# Patient Record
Sex: MALE | Race: WHITE | NOT HISPANIC OR LATINO | URBAN - METROPOLITAN AREA
[De-identification: names, ages, dates, MRNs, and addresses within clinical notes are randomized per-mention and may not be internally consistent; named-entity substitution may affect disease eponyms.]

---

## 2017-06-03 ENCOUNTER — EMERGENCY (EMERGENCY)
Facility: HOSPITAL | Age: 67
LOS: 1 days | Discharge: HOME | End: 2017-06-03
Attending: EMERGENCY MEDICINE
Payer: MEDICARE

## 2017-06-03 VITALS
HEART RATE: 110 BPM | WEIGHT: 240.08 LBS | RESPIRATION RATE: 20 BRPM | TEMPERATURE: 98 F | HEIGHT: 70 IN | OXYGEN SATURATION: 98 % | DIASTOLIC BLOOD PRESSURE: 90 MMHG | SYSTOLIC BLOOD PRESSURE: 164 MMHG

## 2017-06-03 DIAGNOSIS — K05.10 CHRONIC GINGIVITIS, PLAQUE INDUCED: ICD-10-CM

## 2017-06-03 DIAGNOSIS — Z88.8 ALLERGY STATUS TO OTHER DRUGS, MEDICAMENTS AND BIOLOGICAL SUBSTANCES STATUS: ICD-10-CM

## 2017-06-03 DIAGNOSIS — Z88.0 ALLERGY STATUS TO PENICILLIN: ICD-10-CM

## 2017-06-03 DIAGNOSIS — K13.79 OTHER LESIONS OF ORAL MUCOSA: ICD-10-CM

## 2017-06-03 PROCEDURE — 99283 EMERGENCY DEPT VISIT LOW MDM: CPT | Mod: 25

## 2017-06-03 PROCEDURE — 96372 THER/PROPH/DIAG INJ SC/IM: CPT

## 2017-06-03 PROCEDURE — 99283 EMERGENCY DEPT VISIT LOW MDM: CPT

## 2017-06-03 RX ORDER — KETOROLAC TROMETHAMINE 30 MG/ML
1 SYRINGE (ML) INJECTION
Qty: 8 | Refills: 0 | OUTPATIENT
Start: 2017-06-03 | End: 2017-06-05

## 2017-06-03 RX ORDER — KETOROLAC TROMETHAMINE 30 MG/ML
60 SYRINGE (ML) INJECTION ONCE
Qty: 0 | Refills: 0 | Status: DISCONTINUED | OUTPATIENT
Start: 2017-06-03 | End: 2017-06-03

## 2017-06-03 RX ADMIN — Medication 60 MILLIGRAM(S): at 19:23

## 2017-06-03 RX ADMIN — Medication 300 MILLIGRAM(S): at 19:23

## 2017-06-03 NOTE — ED STATDOCS - OBJECTIVE STATEMENT
68 y/o M w/ PMHx of DDD and hyperthyroidism presents to the ED c/o shooting R jaw pain this morning. Pt states he had his teeth shaved down in the past. Pain Meds: Methadone 80mg and Fentanyl patch 50mg. Pt does not take any blood thinners. Pt has been using Oragel w/ no relief. No further complaints at this time.

## 2017-06-03 NOTE — ED ADULT TRIAGE NOTE - CHIEF COMPLAINT QUOTE
Patient arrived to ED today with c/o right mouth pain, right jaw pain, headache, dizziness, and blurry vision.  Patient states he has an infected tooth.

## 2017-06-03 NOTE — ED STATDOCS - NS ED MD SCRIBE ATTENDING SCRIBE SECTIONS
HIV/PAST MEDICAL/SURGICAL/SOCIAL HISTORY/INTAKE ASSESSMENT/SCREENINGS/VITAL SIGNS( Pullset)/REVIEW OF SYSTEMS/PHYSICAL EXAM/HISTORY OF PRESENT ILLNESS/DISPOSITION

## 2017-06-03 NOTE — ED STATDOCS - ATTENDING CONTRIBUTION TO CARE
I, Ruby Prado, performed the initial face to face bedside interview with this patient regarding history of present illness, review of symptoms and relevant past medical, social and family history.  I completed an independent physical examination.  I was the initial provider who evaluated this patient. I have signed out the follow up of any pending tests (i.e. labs, radiological studies) to the ACP.  I have communicated the patient’s plan of care and disposition with the ACP.  The history, relevant review of systems, past medical and surgical history, medical decision making, and physical examination was documented by the scribe in my presence and I attest to the accuracy of the documentation.

## 2017-06-03 NOTE — ED STATDOCS - PROGRESS NOTE DETAILS
HPI, ROS, PE done by intake doc. Orders/Plan reviewed. Pt feeling better after pain medications. PE- Well developed, well-nourish, resting comfortably in NAD. Mouth: gum swollen. grey puss to posterior upper gingival. exquisitely tender.  Plan: Clinda. Toradol. F/u with dental

## 2017-06-11 ENCOUNTER — EMERGENCY (EMERGENCY)
Facility: HOSPITAL | Age: 67
LOS: 1 days | Discharge: HOME | End: 2017-06-11
Attending: EMERGENCY MEDICINE
Payer: MEDICARE

## 2017-06-11 VITALS
DIASTOLIC BLOOD PRESSURE: 84 MMHG | SYSTOLIC BLOOD PRESSURE: 146 MMHG | HEART RATE: 82 BPM | OXYGEN SATURATION: 98 % | RESPIRATION RATE: 17 BRPM | TEMPERATURE: 98 F

## 2017-06-11 VITALS
DIASTOLIC BLOOD PRESSURE: 80 MMHG | WEIGHT: 240.08 LBS | SYSTOLIC BLOOD PRESSURE: 147 MMHG | TEMPERATURE: 98 F | OXYGEN SATURATION: 98 % | HEIGHT: 70 IN | HEART RATE: 96 BPM | RESPIRATION RATE: 20 BRPM

## 2017-06-11 DIAGNOSIS — Z98.890 OTHER SPECIFIED POSTPROCEDURAL STATES: Chronic | ICD-10-CM

## 2017-06-11 LAB
ALBUMIN SERPL ELPH-MCNC: 3.3 G/DL — SIGNIFICANT CHANGE UP (ref 3.3–5.2)
ALP SERPL-CCNC: 70 U/L — SIGNIFICANT CHANGE UP (ref 40–120)
ALT FLD-CCNC: 10 U/L — SIGNIFICANT CHANGE UP
ANION GAP SERPL CALC-SCNC: 12 MMOL/L — SIGNIFICANT CHANGE UP (ref 5–17)
APTT BLD: 34.1 SEC — SIGNIFICANT CHANGE UP (ref 27.5–37.4)
AST SERPL-CCNC: 9 U/L — SIGNIFICANT CHANGE UP
BILIRUB SERPL-MCNC: 0.2 MG/DL — LOW (ref 0.4–2)
BUN SERPL-MCNC: 17 MG/DL — SIGNIFICANT CHANGE UP (ref 8–20)
CALCIUM SERPL-MCNC: 9.2 MG/DL — SIGNIFICANT CHANGE UP (ref 8.6–10.2)
CHLORIDE SERPL-SCNC: 98 MMOL/L — SIGNIFICANT CHANGE UP (ref 98–107)
CK SERPL-CCNC: 18 U/L — LOW (ref 30–200)
CO2 SERPL-SCNC: 25 MMOL/L — SIGNIFICANT CHANGE UP (ref 22–29)
CREAT SERPL-MCNC: 0.8 MG/DL — SIGNIFICANT CHANGE UP (ref 0.5–1.3)
GLUCOSE SERPL-MCNC: 106 MG/DL — SIGNIFICANT CHANGE UP (ref 70–115)
HCT VFR BLD CALC: 38.4 % — LOW (ref 42–52)
HGB BLD-MCNC: 12.4 G/DL — LOW (ref 14–18)
INR BLD: 1.06 RATIO — SIGNIFICANT CHANGE UP (ref 0.88–1.16)
MCHC RBC-ENTMCNC: 27.6 PG — SIGNIFICANT CHANGE UP (ref 27–31)
MCHC RBC-ENTMCNC: 32.3 G/DL — SIGNIFICANT CHANGE UP (ref 32–36)
MCV RBC AUTO: 85.3 FL — SIGNIFICANT CHANGE UP (ref 80–94)
PLATELET # BLD AUTO: 235 K/UL — SIGNIFICANT CHANGE UP (ref 150–400)
POTASSIUM SERPL-MCNC: 4 MMOL/L — SIGNIFICANT CHANGE UP (ref 3.5–5.3)
POTASSIUM SERPL-SCNC: 4 MMOL/L — SIGNIFICANT CHANGE UP (ref 3.5–5.3)
PROT SERPL-MCNC: 6.7 G/DL — SIGNIFICANT CHANGE UP (ref 6.6–8.7)
PROTHROM AB SERPL-ACNC: 11.7 SEC — SIGNIFICANT CHANGE UP (ref 9.8–12.7)
RBC # BLD: 4.5 M/UL — LOW (ref 4.6–6.2)
RBC # FLD: 14.3 % — SIGNIFICANT CHANGE UP (ref 11–15.6)
SODIUM SERPL-SCNC: 135 MMOL/L — SIGNIFICANT CHANGE UP (ref 135–145)
TROPONIN T SERPL-MCNC: <0.01 NG/ML — SIGNIFICANT CHANGE UP (ref 0–0.06)
TROPONIN T SERPL-MCNC: <0.01 NG/ML — SIGNIFICANT CHANGE UP (ref 0–0.06)
WBC # BLD: 10 K/UL — SIGNIFICANT CHANGE UP (ref 4.8–10.8)
WBC # FLD AUTO: 10 K/UL — SIGNIFICANT CHANGE UP (ref 4.8–10.8)

## 2017-06-11 PROCEDURE — 93010 ELECTROCARDIOGRAM REPORT: CPT

## 2017-06-11 PROCEDURE — 71020: CPT | Mod: 26

## 2017-06-11 PROCEDURE — 85610 PROTHROMBIN TIME: CPT

## 2017-06-11 PROCEDURE — 71046 X-RAY EXAM CHEST 2 VIEWS: CPT

## 2017-06-11 PROCEDURE — 85379 FIBRIN DEGRADATION QUANT: CPT

## 2017-06-11 PROCEDURE — 99285 EMERGENCY DEPT VISIT HI MDM: CPT

## 2017-06-11 PROCEDURE — 82550 ASSAY OF CK (CPK): CPT

## 2017-06-11 PROCEDURE — 85730 THROMBOPLASTIN TIME PARTIAL: CPT

## 2017-06-11 PROCEDURE — 71275 CT ANGIOGRAPHY CHEST: CPT

## 2017-06-11 PROCEDURE — 80053 COMPREHEN METABOLIC PANEL: CPT

## 2017-06-11 PROCEDURE — 93005 ELECTROCARDIOGRAM TRACING: CPT

## 2017-06-11 PROCEDURE — 84484 ASSAY OF TROPONIN QUANT: CPT

## 2017-06-11 PROCEDURE — 85027 COMPLETE CBC AUTOMATED: CPT

## 2017-06-11 PROCEDURE — 71275 CT ANGIOGRAPHY CHEST: CPT | Mod: 26

## 2017-06-11 PROCEDURE — 99284 EMERGENCY DEPT VISIT MOD MDM: CPT | Mod: 25

## 2017-06-11 NOTE — ED PROVIDER NOTE - PROGRESS NOTE DETAILS
as per signout, second set of CE and CTA reviewed  both negative.  pt asymptomatic in ER.   will d/c home.

## 2017-06-11 NOTE — ED ADULT NURSE NOTE - OBJECTIVE STATEMENT
Pt A&Ox4 presented to ED with SOB, chest tightness, dizziness, no complaints at this at this time. Pt resting comfortably, VSS, no signs of distress at this time, CM in place, Safety maintained, call bell in reach.

## 2017-06-11 NOTE — ED ADULT NURSE NOTE - CHIEF COMPLAINT QUOTE
pt BIBA from home for reports of SOB and chest pain, chronic lymph edema to left lower extremity s.p injury 20 years ago. pt AOX3, rec'd 324mg ASA, 20g right AC from EMS.

## 2017-06-11 NOTE — ED PROVIDER NOTE - OBJECTIVE STATEMENT
66 yo male presents to er c.o episodes of chest discomfort and sob since earlier in day which resolved, pt now just feels tired and weak, pt with h/o chronic lymphedema lt lower leg has been doing fine with that  denies any new trauma or injury has chronic back pain  denies fever or cough

## 2017-06-29 ENCOUNTER — OUTPATIENT (OUTPATIENT)
Dept: OUTPATIENT SERVICES | Facility: HOSPITAL | Age: 67
LOS: 1 days | End: 2017-06-29
Payer: MEDICARE

## 2017-06-29 ENCOUNTER — APPOINTMENT (OUTPATIENT)
Dept: CT IMAGING | Facility: CLINIC | Age: 67
End: 2017-06-29

## 2017-06-29 DIAGNOSIS — J90 PLEURAL EFFUSION, NOT ELSEWHERE CLASSIFIED: ICD-10-CM

## 2017-06-29 DIAGNOSIS — Z98.890 OTHER SPECIFIED POSTPROCEDURAL STATES: Chronic | ICD-10-CM

## 2017-06-29 PROCEDURE — 71250 CT THORAX DX C-: CPT

## 2017-07-10 ENCOUNTER — APPOINTMENT (OUTPATIENT)
Dept: THORACIC SURGERY | Facility: CLINIC | Age: 67
End: 2017-07-10

## 2017-07-10 VITALS
RESPIRATION RATE: 16 BRPM | WEIGHT: 244 LBS | SYSTOLIC BLOOD PRESSURE: 90 MMHG | BODY MASS INDEX: 36.98 KG/M2 | HEART RATE: 100 BPM | HEIGHT: 68 IN | OXYGEN SATURATION: 96 % | DIASTOLIC BLOOD PRESSURE: 57 MMHG

## 2017-07-10 RX ORDER — SUCRALFATE 1 G/10ML
1 SUSPENSION ORAL
Qty: 1260 | Refills: 0 | Status: ACTIVE | COMMUNITY
Start: 2017-04-25

## 2017-07-10 RX ORDER — DICLOFENAC SODIUM 20 MG/G
2 SOLUTION TOPICAL
Qty: 112 | Refills: 0 | Status: ACTIVE | COMMUNITY
Start: 2017-01-09

## 2017-07-10 RX ORDER — TAMSULOSIN HYDROCHLORIDE 0.4 MG/1
0.4 CAPSULE ORAL
Qty: 30 | Refills: 0 | Status: COMPLETED | COMMUNITY
Start: 2017-06-05

## 2017-07-10 RX ORDER — CLINDAMYCIN HYDROCHLORIDE 300 MG/1
300 CAPSULE ORAL
Qty: 40 | Refills: 0 | Status: COMPLETED | COMMUNITY
Start: 2017-06-04

## 2017-07-10 RX ORDER — SUCRALFATE 1 G/1
1 TABLET ORAL
Qty: 120 | Refills: 0 | Status: ACTIVE | COMMUNITY
Start: 2017-01-16

## 2017-09-08 ENCOUNTER — EMERGENCY (EMERGENCY)
Facility: HOSPITAL | Age: 67
LOS: 1 days | Discharge: HOME | End: 2017-09-08
Attending: STUDENT IN AN ORGANIZED HEALTH CARE EDUCATION/TRAINING PROGRAM | Admitting: STUDENT IN AN ORGANIZED HEALTH CARE EDUCATION/TRAINING PROGRAM
Payer: MEDICARE

## 2017-09-08 VITALS
OXYGEN SATURATION: 95 % | WEIGHT: 240.08 LBS | HEART RATE: 95 BPM | RESPIRATION RATE: 18 BRPM | DIASTOLIC BLOOD PRESSURE: 90 MMHG | TEMPERATURE: 99 F | SYSTOLIC BLOOD PRESSURE: 147 MMHG

## 2017-09-08 VITALS
RESPIRATION RATE: 18 BRPM | DIASTOLIC BLOOD PRESSURE: 79 MMHG | TEMPERATURE: 98 F | HEART RATE: 86 BPM | SYSTOLIC BLOOD PRESSURE: 129 MMHG | OXYGEN SATURATION: 99 %

## 2017-09-08 DIAGNOSIS — Z98.890 OTHER SPECIFIED POSTPROCEDURAL STATES: Chronic | ICD-10-CM

## 2017-09-08 LAB
ALBUMIN SERPL ELPH-MCNC: 3.6 G/DL — SIGNIFICANT CHANGE UP (ref 3.3–5.2)
ALP SERPL-CCNC: 79 U/L — SIGNIFICANT CHANGE UP (ref 40–120)
ALT FLD-CCNC: 11 U/L — SIGNIFICANT CHANGE UP
ANION GAP SERPL CALC-SCNC: 8 MMOL/L — SIGNIFICANT CHANGE UP (ref 5–17)
ANISOCYTOSIS BLD QL: SLIGHT — SIGNIFICANT CHANGE UP
APPEARANCE UR: CLEAR — SIGNIFICANT CHANGE UP
AST SERPL-CCNC: 13 U/L — SIGNIFICANT CHANGE UP
BACTERIA # UR AUTO: ABNORMAL
BASOPHILS NFR BLD AUTO: 1 % — SIGNIFICANT CHANGE UP (ref 0–2)
BILIRUB SERPL-MCNC: 0.2 MG/DL — LOW (ref 0.4–2)
BILIRUB UR-MCNC: NEGATIVE — SIGNIFICANT CHANGE UP
BUN SERPL-MCNC: 16 MG/DL — SIGNIFICANT CHANGE UP (ref 8–20)
CALCIUM SERPL-MCNC: 9.5 MG/DL — SIGNIFICANT CHANGE UP (ref 8.6–10.2)
CHLORIDE SERPL-SCNC: 99 MMOL/L — SIGNIFICANT CHANGE UP (ref 98–107)
CO2 SERPL-SCNC: 28 MMOL/L — SIGNIFICANT CHANGE UP (ref 22–29)
COLOR SPEC: YELLOW — SIGNIFICANT CHANGE UP
CREAT SERPL-MCNC: 0.83 MG/DL — SIGNIFICANT CHANGE UP (ref 0.5–1.3)
DIFF PNL FLD: NEGATIVE — SIGNIFICANT CHANGE UP
EPI CELLS # UR: SIGNIFICANT CHANGE UP
GLUCOSE SERPL-MCNC: 107 MG/DL — SIGNIFICANT CHANGE UP (ref 70–115)
GLUCOSE UR QL: NEGATIVE MG/DL — SIGNIFICANT CHANGE UP
HCT VFR BLD CALC: 40.5 % — LOW (ref 42–52)
HGB BLD-MCNC: 12.9 G/DL — LOW (ref 14–18)
KETONES UR-MCNC: NEGATIVE — SIGNIFICANT CHANGE UP
LEUKOCYTE ESTERASE UR-ACNC: NEGATIVE — SIGNIFICANT CHANGE UP
LIDOCAIN IGE QN: 21 U/L — LOW (ref 22–51)
LYMPHOCYTES # BLD AUTO: 8 % — LOW (ref 20–55)
MACROCYTES BLD QL: SLIGHT — SIGNIFICANT CHANGE UP
MCHC RBC-ENTMCNC: 26.7 PG — LOW (ref 27–31)
MCHC RBC-ENTMCNC: 31.9 G/DL — LOW (ref 32–36)
MCV RBC AUTO: 83.7 FL — SIGNIFICANT CHANGE UP (ref 80–94)
MONOCYTES NFR BLD AUTO: 4 % — SIGNIFICANT CHANGE UP (ref 3–10)
NEUTROPHILS NFR BLD AUTO: 85 % — HIGH (ref 37–73)
NITRITE UR-MCNC: NEGATIVE — SIGNIFICANT CHANGE UP
PH UR: 6 — SIGNIFICANT CHANGE UP (ref 5–8)
PLAT MORPH BLD: NORMAL — SIGNIFICANT CHANGE UP
PLATELET # BLD AUTO: 230 K/UL — SIGNIFICANT CHANGE UP (ref 150–400)
POIKILOCYTOSIS BLD QL AUTO: SLIGHT — SIGNIFICANT CHANGE UP
POTASSIUM SERPL-MCNC: 4.4 MMOL/L — SIGNIFICANT CHANGE UP (ref 3.5–5.3)
POTASSIUM SERPL-SCNC: 4.4 MMOL/L — SIGNIFICANT CHANGE UP (ref 3.5–5.3)
PROT SERPL-MCNC: 7.6 G/DL — SIGNIFICANT CHANGE UP (ref 6.6–8.7)
PROT UR-MCNC: 15 MG/DL
RBC # BLD: 4.84 M/UL — SIGNIFICANT CHANGE UP (ref 4.6–6.2)
RBC # FLD: 14.5 % — SIGNIFICANT CHANGE UP (ref 11–15.6)
RBC BLD AUTO: ABNORMAL
RBC CASTS # UR COMP ASSIST: SIGNIFICANT CHANGE UP /HPF (ref 0–4)
SODIUM SERPL-SCNC: 135 MMOL/L — SIGNIFICANT CHANGE UP (ref 135–145)
SP GR SPEC: 1.02 — SIGNIFICANT CHANGE UP (ref 1.01–1.02)
UROBILINOGEN FLD QL: NEGATIVE MG/DL — SIGNIFICANT CHANGE UP
VARIANT LYMPHS # BLD: 2 % — SIGNIFICANT CHANGE UP (ref 0–6)
WBC # BLD: 9.6 K/UL — SIGNIFICANT CHANGE UP (ref 4.8–10.8)
WBC # FLD AUTO: 9.6 K/UL — SIGNIFICANT CHANGE UP (ref 4.8–10.8)
WBC UR QL: SIGNIFICANT CHANGE UP

## 2017-09-08 PROCEDURE — 81001 URINALYSIS AUTO W/SCOPE: CPT

## 2017-09-08 PROCEDURE — 85027 COMPLETE CBC AUTOMATED: CPT

## 2017-09-08 PROCEDURE — 99284 EMERGENCY DEPT VISIT MOD MDM: CPT

## 2017-09-08 PROCEDURE — 83690 ASSAY OF LIPASE: CPT

## 2017-09-08 PROCEDURE — 96375 TX/PRO/DX INJ NEW DRUG ADDON: CPT

## 2017-09-08 PROCEDURE — 80053 COMPREHEN METABOLIC PANEL: CPT

## 2017-09-08 PROCEDURE — 74177 CT ABD & PELVIS W/CONTRAST: CPT | Mod: 26

## 2017-09-08 PROCEDURE — 96374 THER/PROPH/DIAG INJ IV PUSH: CPT | Mod: XU

## 2017-09-08 PROCEDURE — 99284 EMERGENCY DEPT VISIT MOD MDM: CPT | Mod: 25

## 2017-09-08 PROCEDURE — 74177 CT ABD & PELVIS W/CONTRAST: CPT

## 2017-09-08 PROCEDURE — 36415 COLL VENOUS BLD VENIPUNCTURE: CPT

## 2017-09-08 RX ORDER — KETOROLAC TROMETHAMINE 30 MG/ML
30 SYRINGE (ML) INJECTION ONCE
Qty: 0 | Refills: 0 | Status: DISCONTINUED | OUTPATIENT
Start: 2017-09-08 | End: 2017-09-08

## 2017-09-08 RX ORDER — HYDROMORPHONE HYDROCHLORIDE 2 MG/ML
1 INJECTION INTRAMUSCULAR; INTRAVENOUS; SUBCUTANEOUS ONCE
Qty: 0 | Refills: 0 | Status: DISCONTINUED | OUTPATIENT
Start: 2017-09-08 | End: 2017-09-08

## 2017-09-08 RX ORDER — SODIUM CHLORIDE 9 MG/ML
3 INJECTION INTRAMUSCULAR; INTRAVENOUS; SUBCUTANEOUS ONCE
Qty: 0 | Refills: 0 | Status: COMPLETED | OUTPATIENT
Start: 2017-09-08 | End: 2017-09-08

## 2017-09-08 RX ORDER — PSYLLIUM SEED (WITH DEXTROSE)
3.4 POWDER (GRAM) ORAL
Qty: 71.4 | Refills: 0 | OUTPATIENT
Start: 2017-09-08 | End: 2017-09-15

## 2017-09-08 RX ORDER — DOCUSATE SODIUM 100 MG
1 CAPSULE ORAL
Qty: 28 | Refills: 0 | OUTPATIENT
Start: 2017-09-08 | End: 2017-09-22

## 2017-09-08 RX ORDER — SODIUM CHLORIDE 9 MG/ML
500 INJECTION INTRAMUSCULAR; INTRAVENOUS; SUBCUTANEOUS ONCE
Qty: 0 | Refills: 0 | Status: COMPLETED | OUTPATIENT
Start: 2017-09-08 | End: 2017-09-08

## 2017-09-08 RX ORDER — DOCUSATE SODIUM 100 MG
100 CAPSULE ORAL ONCE
Qty: 0 | Refills: 0 | Status: COMPLETED | OUTPATIENT
Start: 2017-09-08 | End: 2017-09-08

## 2017-09-08 RX ADMIN — HYDROMORPHONE HYDROCHLORIDE 1 MILLIGRAM(S): 2 INJECTION INTRAMUSCULAR; INTRAVENOUS; SUBCUTANEOUS at 15:45

## 2017-09-08 RX ADMIN — Medication 30 MILLIGRAM(S): at 15:24

## 2017-09-08 RX ADMIN — SODIUM CHLORIDE 3 MILLILITER(S): 9 INJECTION INTRAMUSCULAR; INTRAVENOUS; SUBCUTANEOUS at 14:47

## 2017-09-08 RX ADMIN — SODIUM CHLORIDE 500 MILLILITER(S): 9 INJECTION INTRAMUSCULAR; INTRAVENOUS; SUBCUTANEOUS at 14:59

## 2017-09-08 RX ADMIN — Medication 30 MILLIGRAM(S): at 14:59

## 2017-09-08 RX ADMIN — Medication 100 MILLIGRAM(S): at 18:05

## 2017-09-08 RX ADMIN — HYDROMORPHONE HYDROCHLORIDE 1 MILLIGRAM(S): 2 INJECTION INTRAMUSCULAR; INTRAVENOUS; SUBCUTANEOUS at 16:56

## 2017-09-08 NOTE — ED PROVIDER NOTE - OBJECTIVE STATEMENT
68 yo male presents for worsening abdominal distention, bloating, discomfort for the past 2-3 days prompting presentation. Patient states symptoms have been increasing gradually and asscoaited with increase reflux symptoms and dyspnea in certain positions. 68 yo male presents for worsening abdominal distention, bloating, discomfort for the past 2-3 days prompting presentation. Patient states symptoms have been increasing gradually and associated with increase reflux symptoms and dyspnea in certain positions.

## 2017-09-08 NOTE — ED ADULT NURSE NOTE - OBJECTIVE STATEMENT
pt alert and awake x3, arrived to ED with abd pain and SOB, states the pain started a couple days ago, cant bare pain any longer, abd soft, nontender, distended, states he takes Carafate to help have bowel movements, denies chest pain, states SOB happens when he walks, LS clear, denies n/v/d, ambulates with steady gait, will continue to monitor.

## 2017-09-08 NOTE — ED ADULT TRIAGE NOTE - CHIEF COMPLAINT QUOTE
C/O lower abdominal pain and intermittent shortness of breath X 2 days. Has fentanyl patch for back pain.

## 2018-06-11 ENCOUNTER — EMERGENCY (EMERGENCY)
Facility: HOSPITAL | Age: 68
LOS: 1 days | Discharge: HOME | End: 2018-06-11
Attending: EMERGENCY MEDICINE
Payer: MEDICARE

## 2018-06-11 VITALS
WEIGHT: 240.08 LBS | RESPIRATION RATE: 18 BRPM | DIASTOLIC BLOOD PRESSURE: 95 MMHG | HEIGHT: 70 IN | HEART RATE: 96 BPM | TEMPERATURE: 98 F | OXYGEN SATURATION: 98 % | SYSTOLIC BLOOD PRESSURE: 167 MMHG

## 2018-06-11 DIAGNOSIS — Z98.890 OTHER SPECIFIED POSTPROCEDURAL STATES: Chronic | ICD-10-CM

## 2018-06-11 LAB
ALBUMIN SERPL ELPH-MCNC: 3.7 G/DL — SIGNIFICANT CHANGE UP (ref 3.3–5.2)
ALP SERPL-CCNC: 80 U/L — SIGNIFICANT CHANGE UP (ref 40–120)
ALT FLD-CCNC: 7 U/L — SIGNIFICANT CHANGE UP
ANION GAP SERPL CALC-SCNC: 11 MMOL/L — SIGNIFICANT CHANGE UP (ref 5–17)
APPEARANCE UR: CLEAR — SIGNIFICANT CHANGE UP
APTT BLD: 48.4 SEC — HIGH (ref 27.5–37.4)
AST SERPL-CCNC: 12 U/L — SIGNIFICANT CHANGE UP
BASOPHILS # BLD AUTO: 0 K/UL — SIGNIFICANT CHANGE UP (ref 0–0.2)
BASOPHILS NFR BLD AUTO: 0.2 % — SIGNIFICANT CHANGE UP (ref 0–2)
BILIRUB SERPL-MCNC: 0.3 MG/DL — LOW (ref 0.4–2)
BILIRUB UR-MCNC: NEGATIVE — SIGNIFICANT CHANGE UP
BLD GP AB SCN SERPL QL: SIGNIFICANT CHANGE UP
BUN SERPL-MCNC: 14 MG/DL — SIGNIFICANT CHANGE UP (ref 8–20)
CALCIUM SERPL-MCNC: 9.6 MG/DL — SIGNIFICANT CHANGE UP (ref 8.6–10.2)
CHLORIDE SERPL-SCNC: 96 MMOL/L — LOW (ref 98–107)
CO2 SERPL-SCNC: 25 MMOL/L — SIGNIFICANT CHANGE UP (ref 22–29)
COLOR SPEC: SIGNIFICANT CHANGE UP
CREAT SERPL-MCNC: 0.77 MG/DL — SIGNIFICANT CHANGE UP (ref 0.5–1.3)
DIFF PNL FLD: NEGATIVE — SIGNIFICANT CHANGE UP
EOSINOPHIL # BLD AUTO: 0 K/UL — SIGNIFICANT CHANGE UP (ref 0–0.5)
EOSINOPHIL NFR BLD AUTO: 0.4 % — SIGNIFICANT CHANGE UP (ref 0–5)
GLUCOSE SERPL-MCNC: 104 MG/DL — SIGNIFICANT CHANGE UP (ref 70–115)
GLUCOSE UR QL: NEGATIVE MG/DL — SIGNIFICANT CHANGE UP
HCT VFR BLD CALC: 43.2 % — SIGNIFICANT CHANGE UP (ref 42–52)
HGB BLD-MCNC: 13.4 G/DL — LOW (ref 14–18)
INR BLD: 0.99 RATIO — SIGNIFICANT CHANGE UP (ref 0.88–1.16)
KETONES UR-MCNC: NEGATIVE — SIGNIFICANT CHANGE UP
LEUKOCYTE ESTERASE UR-ACNC: NEGATIVE — SIGNIFICANT CHANGE UP
LIDOCAIN IGE QN: 22 U/L — SIGNIFICANT CHANGE UP (ref 22–51)
LYMPHOCYTES # BLD AUTO: 1 K/UL — SIGNIFICANT CHANGE UP (ref 1–4.8)
LYMPHOCYTES # BLD AUTO: 10.3 % — LOW (ref 20–55)
MCHC RBC-ENTMCNC: 26 PG — LOW (ref 27–31)
MCHC RBC-ENTMCNC: 31 G/DL — LOW (ref 32–36)
MCV RBC AUTO: 83.7 FL — SIGNIFICANT CHANGE UP (ref 80–94)
MONOCYTES # BLD AUTO: 0.6 K/UL — SIGNIFICANT CHANGE UP (ref 0–0.8)
MONOCYTES NFR BLD AUTO: 5.9 % — SIGNIFICANT CHANGE UP (ref 3–10)
NEUTROPHILS # BLD AUTO: 8.3 K/UL — HIGH (ref 1.8–8)
NEUTROPHILS NFR BLD AUTO: 82.8 % — HIGH (ref 37–73)
NITRITE UR-MCNC: NEGATIVE — SIGNIFICANT CHANGE UP
PH UR: 6.5 — SIGNIFICANT CHANGE UP (ref 5–8)
PLATELET # BLD AUTO: 234 K/UL — SIGNIFICANT CHANGE UP (ref 150–400)
POTASSIUM SERPL-MCNC: 4.3 MMOL/L — SIGNIFICANT CHANGE UP (ref 3.5–5.3)
POTASSIUM SERPL-SCNC: 4.3 MMOL/L — SIGNIFICANT CHANGE UP (ref 3.5–5.3)
PROT SERPL-MCNC: 7.4 G/DL — SIGNIFICANT CHANGE UP (ref 6.6–8.7)
PROT UR-MCNC: NEGATIVE MG/DL — SIGNIFICANT CHANGE UP
PROTHROM AB SERPL-ACNC: 10.9 SEC — SIGNIFICANT CHANGE UP (ref 9.8–12.7)
RBC # BLD: 5.16 M/UL — SIGNIFICANT CHANGE UP (ref 4.6–6.2)
RBC # FLD: 14.8 % — SIGNIFICANT CHANGE UP (ref 11–15.6)
SODIUM SERPL-SCNC: 132 MMOL/L — LOW (ref 135–145)
SP GR SPEC: 1 — LOW (ref 1.01–1.02)
TYPE + AB SCN PNL BLD: SIGNIFICANT CHANGE UP
UROBILINOGEN FLD QL: NEGATIVE MG/DL — SIGNIFICANT CHANGE UP
WBC # BLD: 10 K/UL — SIGNIFICANT CHANGE UP (ref 4.8–10.8)
WBC # FLD AUTO: 10 K/UL — SIGNIFICANT CHANGE UP (ref 4.8–10.8)

## 2018-06-11 PROCEDURE — 80053 COMPREHEN METABOLIC PANEL: CPT

## 2018-06-11 PROCEDURE — 86850 RBC ANTIBODY SCREEN: CPT

## 2018-06-11 PROCEDURE — 99284 EMERGENCY DEPT VISIT MOD MDM: CPT

## 2018-06-11 PROCEDURE — 85027 COMPLETE CBC AUTOMATED: CPT

## 2018-06-11 PROCEDURE — 74177 CT ABD & PELVIS W/CONTRAST: CPT

## 2018-06-11 PROCEDURE — 85730 THROMBOPLASTIN TIME PARTIAL: CPT

## 2018-06-11 PROCEDURE — 85610 PROTHROMBIN TIME: CPT

## 2018-06-11 PROCEDURE — 83690 ASSAY OF LIPASE: CPT

## 2018-06-11 PROCEDURE — 74177 CT ABD & PELVIS W/CONTRAST: CPT | Mod: 26

## 2018-06-11 PROCEDURE — 36415 COLL VENOUS BLD VENIPUNCTURE: CPT

## 2018-06-11 PROCEDURE — 86900 BLOOD TYPING SEROLOGIC ABO: CPT

## 2018-06-11 PROCEDURE — 81003 URINALYSIS AUTO W/O SCOPE: CPT

## 2018-06-11 PROCEDURE — 86901 BLOOD TYPING SEROLOGIC RH(D): CPT

## 2018-06-11 PROCEDURE — 87086 URINE CULTURE/COLONY COUNT: CPT

## 2018-06-11 NOTE — ED ADULT NURSE REASSESSMENT NOTE - NS ED NURSE REASSESS COMMENT FT1
pt taken to CT at this time. even and unlabored resps, no distress present. pt with no vomiting, offering no current complaints.

## 2018-06-11 NOTE — ED ADULT NURSE NOTE - NS TRANSFER PATIENT BELONGINGS
Clothing I have reviewed and confirmed nurses' notes for patient's medications, allergies, medical history, and surgical history.

## 2018-06-11 NOTE — ED ADULT NURSE NOTE - OBJECTIVE STATEMENT
pt comes to eD with reports of abd pain and diff urinating x 1 month, as well as difficulty having BM with frequent constipation. pt reporting he had "Staples put in his prostate" 1 month ago, and states he has been having these symptoms ever since. even and unlabored resps, no vomiting, reports intermittent nausea. skin warm dry and intact.

## 2018-06-12 VITALS
SYSTOLIC BLOOD PRESSURE: 153 MMHG | HEART RATE: 95 BPM | RESPIRATION RATE: 18 BRPM | TEMPERATURE: 98 F | DIASTOLIC BLOOD PRESSURE: 83 MMHG | OXYGEN SATURATION: 96 %

## 2018-06-13 LAB
CULTURE RESULTS: SIGNIFICANT CHANGE UP
SPECIMEN SOURCE: SIGNIFICANT CHANGE UP

## 2018-11-07 ENCOUNTER — INPATIENT (INPATIENT)
Facility: HOSPITAL | Age: 68
LOS: 2 days | Discharge: HOME | DRG: 281 | End: 2018-11-10
Attending: INTERNAL MEDICINE | Admitting: HOSPITALIST
Payer: MEDICARE

## 2018-11-07 VITALS
WEIGHT: 240.08 LBS | DIASTOLIC BLOOD PRESSURE: 94 MMHG | HEIGHT: 69 IN | RESPIRATION RATE: 22 BRPM | OXYGEN SATURATION: 96 % | SYSTOLIC BLOOD PRESSURE: 162 MMHG | TEMPERATURE: 98 F | HEART RATE: 104 BPM

## 2018-11-07 DIAGNOSIS — R07.9 CHEST PAIN, UNSPECIFIED: ICD-10-CM

## 2018-11-07 DIAGNOSIS — Z98.890 OTHER SPECIFIED POSTPROCEDURAL STATES: Chronic | ICD-10-CM

## 2018-11-07 LAB
ALBUMIN SERPL ELPH-MCNC: 3.8 G/DL — SIGNIFICANT CHANGE UP (ref 3.3–5.2)
ALP SERPL-CCNC: 79 U/L — SIGNIFICANT CHANGE UP (ref 40–120)
ALT FLD-CCNC: 9 U/L — SIGNIFICANT CHANGE UP
ANION GAP SERPL CALC-SCNC: 15 MMOL/L — SIGNIFICANT CHANGE UP (ref 5–17)
APTT BLD: 26.3 SEC — LOW (ref 27.5–36.3)
AST SERPL-CCNC: 11 U/L — SIGNIFICANT CHANGE UP
BASOPHILS # BLD AUTO: 0 K/UL — SIGNIFICANT CHANGE UP (ref 0–0.2)
BASOPHILS NFR BLD AUTO: 0.2 % — SIGNIFICANT CHANGE UP (ref 0–2)
BILIRUB SERPL-MCNC: 0.3 MG/DL — LOW (ref 0.4–2)
BUN SERPL-MCNC: 19 MG/DL — SIGNIFICANT CHANGE UP (ref 8–20)
CALCIUM SERPL-MCNC: 9.8 MG/DL — SIGNIFICANT CHANGE UP (ref 8.6–10.2)
CHLORIDE SERPL-SCNC: 95 MMOL/L — LOW (ref 98–107)
CK SERPL-CCNC: 28 U/L — LOW (ref 30–200)
CO2 SERPL-SCNC: 24 MMOL/L — SIGNIFICANT CHANGE UP (ref 22–29)
CREAT SERPL-MCNC: 1.13 MG/DL — SIGNIFICANT CHANGE UP (ref 0.5–1.3)
EOSINOPHIL # BLD AUTO: 0.1 K/UL — SIGNIFICANT CHANGE UP (ref 0–0.5)
EOSINOPHIL NFR BLD AUTO: 0.6 % — SIGNIFICANT CHANGE UP (ref 0–6)
GLUCOSE SERPL-MCNC: 115 MG/DL — SIGNIFICANT CHANGE UP (ref 70–115)
HCT VFR BLD CALC: 42.2 % — SIGNIFICANT CHANGE UP (ref 42–52)
HGB BLD-MCNC: 13 G/DL — LOW (ref 14–18)
INR BLD: 1.01 RATIO — SIGNIFICANT CHANGE UP (ref 0.88–1.16)
LYMPHOCYTES # BLD AUTO: 1.3 K/UL — SIGNIFICANT CHANGE UP (ref 1–4.8)
LYMPHOCYTES # BLD AUTO: 12.7 % — LOW (ref 20–55)
MCHC RBC-ENTMCNC: 25 PG — LOW (ref 27–31)
MCHC RBC-ENTMCNC: 30.8 G/DL — LOW (ref 32–36)
MCV RBC AUTO: 81.3 FL — SIGNIFICANT CHANGE UP (ref 80–94)
MONOCYTES # BLD AUTO: 0.6 K/UL — SIGNIFICANT CHANGE UP (ref 0–0.8)
MONOCYTES NFR BLD AUTO: 6.1 % — SIGNIFICANT CHANGE UP (ref 3–10)
NEUTROPHILS # BLD AUTO: 8.3 K/UL — HIGH (ref 1.8–8)
NEUTROPHILS NFR BLD AUTO: 80 % — HIGH (ref 37–73)
PLATELET # BLD AUTO: 263 K/UL — SIGNIFICANT CHANGE UP (ref 150–400)
POTASSIUM SERPL-MCNC: 3.7 MMOL/L — SIGNIFICANT CHANGE UP (ref 3.5–5.3)
POTASSIUM SERPL-SCNC: 3.7 MMOL/L — SIGNIFICANT CHANGE UP (ref 3.5–5.3)
PROT SERPL-MCNC: 7.3 G/DL — SIGNIFICANT CHANGE UP (ref 6.6–8.7)
PROTHROM AB SERPL-ACNC: 11.6 SEC — SIGNIFICANT CHANGE UP (ref 10–12.9)
RBC # BLD: 5.19 M/UL — SIGNIFICANT CHANGE UP (ref 4.6–6.2)
RBC # FLD: 16.7 % — HIGH (ref 11–15.6)
SODIUM SERPL-SCNC: 134 MMOL/L — LOW (ref 135–145)
TROPONIN T SERPL-MCNC: <0.01 NG/ML — SIGNIFICANT CHANGE UP (ref 0–0.06)
WBC # BLD: 10.3 K/UL — SIGNIFICANT CHANGE UP (ref 4.8–10.8)
WBC # FLD AUTO: 10.3 K/UL — SIGNIFICANT CHANGE UP (ref 4.8–10.8)

## 2018-11-07 PROCEDURE — 99406 BEHAV CHNG SMOKING 3-10 MIN: CPT

## 2018-11-07 PROCEDURE — 71045 X-RAY EXAM CHEST 1 VIEW: CPT | Mod: 26

## 2018-11-07 PROCEDURE — 93010 ELECTROCARDIOGRAM REPORT: CPT

## 2018-11-07 PROCEDURE — 99285 EMERGENCY DEPT VISIT HI MDM: CPT

## 2018-11-07 PROCEDURE — 99223 1ST HOSP IP/OBS HIGH 75: CPT | Mod: 25,GC

## 2018-11-07 RX ORDER — OMEPRAZOLE 10 MG/1
1 CAPSULE, DELAYED RELEASE ORAL
Qty: 0 | Refills: 0 | COMMUNITY

## 2018-11-07 RX ORDER — LEVOTHYROXINE SODIUM 125 MCG
200 TABLET ORAL ONCE
Qty: 0 | Refills: 0 | Status: COMPLETED | OUTPATIENT
Start: 2018-11-07 | End: 2018-11-07

## 2018-11-07 RX ORDER — METOPROLOL TARTRATE 50 MG
2.5 TABLET ORAL ONCE
Qty: 0 | Refills: 0 | Status: COMPLETED | OUTPATIENT
Start: 2018-11-07 | End: 2018-11-07

## 2018-11-07 RX ORDER — MORPHINE SULFATE 50 MG/1
2 CAPSULE, EXTENDED RELEASE ORAL ONCE
Qty: 0 | Refills: 0 | Status: DISCONTINUED | OUTPATIENT
Start: 2018-11-07 | End: 2018-11-07

## 2018-11-07 RX ORDER — TAMSULOSIN HYDROCHLORIDE 0.4 MG/1
0 CAPSULE ORAL
Qty: 0 | Refills: 0 | COMMUNITY

## 2018-11-07 RX ADMIN — MORPHINE SULFATE 2 MILLIGRAM(S): 50 CAPSULE, EXTENDED RELEASE ORAL at 20:40

## 2018-11-07 RX ADMIN — Medication 2.5 MILLIGRAM(S): at 20:17

## 2018-11-07 RX ADMIN — MORPHINE SULFATE 2 MILLIGRAM(S): 50 CAPSULE, EXTENDED RELEASE ORAL at 20:18

## 2018-11-07 RX ADMIN — Medication 200 MICROGRAM(S): at 20:18
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
## 2018-11-07 NOTE — H&P ADULT - NSHPSOCIALHISTORY_GEN_ALL_CORE
Lives alone at home, unemployed, +tobacco user 0.5pack/day, hx of quitting >30 yrs, denies illicit drug use, Quit EtOH 1988, hx of alcohol abuse/dependence

## 2018-11-07 NOTE — H&P ADULT - ATTENDING COMMENTS
Delayed entry note, patient seen and examined 11/7/18 at approx. 11PM  Patient is a 69 yo M presenting w/ chest pain and palpitations. PMH herniated discs and chronic back pain on fentanyl, hypothyroidism, hx of R sided plueral effusion s/p chest tube or thoracentesis by Dr. Menard.   Patient had chest pain earlier and palpitations, called EMS. En route patient noted to have SVT and was given adenosine 6mgx1, and then 12mgx1 w/ conversion to sinus tachy.. EKG in hospital sinus tachy. Currently on tele during my exam patient NSR.    T(C): 36.7 (11-07-18 @ 21:36), Max: 36.7 (11-07-18 @ 18:53)  HR: 92 (11-08-18 @ 00:28) (91 - 104)  BP: 139/77 (11-08-18 @ 00:28) (134/81 - 162/94)  RR: 24 (11-08-18 @ 00:28) (20 - 24)  SpO2: 98% (11-08-18 @ 00:28) (96% - 99%)  Wt(kg): --    Physical Exam:   GENERAL: well-groomed, well-developed, NAD  HEENT: head NC/AT; EOM intact, conjunctiva & sclera clear; hearing grossly intact, moist mucous membranes  NECK: supple, no JVD  RESPIRATORY: CTA B/L, no wheezing, rales, rhonchi or rubs  CARDIOVASCULAR: S1&S2, RRR, no murmurs or gallops  ABDOMEN: soft, non-tender, non-distended, + Bowel sounds x4 quadrants, no guarding, rebound or rigidity  MUSCULOSKELETAL:  no clubbing, cyanosis or edema of all 4 extremities  LYMPH: no cervical lymphadenopathy  VASCULAR: Radial pulses 2+ bilaterally, no varicose veins   SKIN: warm and dry, color normal  NEUROLOGIC: AA&O X3, CN2-12 intact w/ no focal deficits, no sensory loss, motor Strength 5/5 in UE & LE B/L  Psych: Normal mood and affect, normal behavior  Plan:   Chest pain: R/o ACS. Trend troponin x3 total. Obtain TTE. Consult Cardiology. Monitor on tele.   SVT: resolved. Monitor on telemetry. Optimize lytes, K>4 and Mg>2.  Hypothyroidism: TSH 4.19. Resume Synthroid as patient is not hyperthyroid.   Chronic Back pain due to herniated discs: continue methadone and fentanyl patch  HTN: BP initially elevated on admission, however now normotensive. Continue to monitor.   Tobacco dependence: counselled Patient was counselled on tobacco cessation. He was informed of the increased risk of lung cancer and cardiovascular disease, COPD among other health risks associated with smoking and tobacco use. Cessation was advised. 3 min spent on cessation counseling.  [facility]  reviewed, patient does receive Methadone, alprazolam and Fentanyl from Dr. Adonis Ivory (and NP Machelle Tompkins) last refilled October 5th and 10th 2018.

## 2018-11-07 NOTE — H&P ADULT - PMH
DDD (degenerative disc disease), lumbar    Hypothyroidism, unspecified type    Lymphedema of left lower extremity    Pleural effusion on right    Tobacco dependence due to cigarettes
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
## 2018-11-07 NOTE — H&P ADULT - NSHPOUTPATIENTPROVIDERS_GEN_ALL_CORE
Primary care: Dr. Barrett Ivory ([facility] - [phone])  Pain Med: Dr. Machelle Iqbal ([place] [facility] [place])  Urologist: Dr. Calderon Newsome

## 2018-11-07 NOTE — ED ADULT TRIAGE NOTE - CHIEF COMPLAINT QUOTE
pt BIBA for reports of chest pain, as per EMS pt found to be in SVT, given 12mg Adenosine and pt converted to NSR pt AOX3, 2/10 pain on arrival. 20g to left AC present.

## 2018-11-07 NOTE — H&P ADULT - NSHPLABSRESULTS_GEN_ALL_CORE
13.0   10.3  )-----------( 263      ( 07 Nov 2018 19:10 )             42.2   11-07    134<L>  |  95<L>  |  19.0  ----------------------------<  115  3.7   |  24.0  |  1.13    Ca    9.8      07 Nov 2018 19:10  Phos  3.3     11-07  Mg     2.1     11-07    TPro  7.3  /  Alb  3.8  /  TBili  0.3<L>  /  DBili  x   /  AST  11  /  ALT  9   /  AlkPhos  79  11-07    Magnesium, Serum: 2.1 mg/dL (11.07.18 @ 19:10)  Phosphorus Level, Serum: 3.3 mg/dL (11.07.18 @ 19:10)    Thyroid Stimulating Hormone, Serum: 4.19 uIU/mL (11.07.18 @ 19:10)    CARDIAC MARKERS ( 07 Nov 2018 19:10 )  x     / <0.01 ng/mL / 28 U/L / x     / x        f/u UA+microscopic  f/u CXR

## 2018-11-07 NOTE — H&P ADULT - NSHPREVIEWOFSYSTEMS_GEN_ALL_CORE
ROS: denies HA, vision changes, palpitations, SOB, abd pain, dysuria, constipation/diarrhea ROS: denies HA, vision changes, SOB, abd pain, dysuria, constipation/diarrhea

## 2018-11-07 NOTE — H&P ADULT - NSHPPHYSICALEXAM_GEN_ALL_CORE
T(C): 36.7 (11-07-18 @ 21:36), Max: 36.7 (11-07-18 @ 18:53)  HR: 92 (11-08-18 @ 00:28) (91 - 104)  BP: 139/77 (11-08-18 @ 00:28) (134/81 - 162/94)  RR: 24 (11-08-18 @ 00:28) (20 - 24)  SpO2: 98% (11-08-18 @ 00:28) (96% - 99%)    GEN - appears age appropriate. obese, pleasant. no distress, slight anxious   HEENT - NCAT, EOMI, CA, no JVD/bruit.  RESP - CTA BL, no wheeze/stridor/rhonchi/crackles. not on supplemental O2.  CARDIO - NS1S2, RRR. No murmurs/rubs/gallops.  ABD - Soft/Non tender/Non distended, obese abd. Normal BS x4 quadrants.   Ext - Lt LE - chronic lymphedema stocking in place. Rt LE - non edematous, non tender, warm, no signs of venous/arterial stasis ulcers  MSK - full ROM of BL upper and lower extremities without pain or restriction. BL 5/5 strength on upper and lower extremities.   Neuro - cn 2-12 grossly intact. cerebellar function intact. no visible seizure activity appreciated. no tremor. gait not observed.   Skin - clean, dry, intact. no rashes or lesions, cap refill 3-4 sec  Psych- AAOx3. no suicidal/homicidal ideation. appropriate behaviour. attentive. normal affect.

## 2018-11-07 NOTE — H&P ADULT - PSH
H/O Spinal surgery    S/P inguinal hernia repair    S/P right knee surgery    S/P rotator cuff repair

## 2018-11-08 DIAGNOSIS — K40.20 BILATERAL INGUINAL HERNIA, WITHOUT OBSTRUCTION OR GANGRENE, NOT SPECIFIED AS RECURRENT: Chronic | ICD-10-CM

## 2018-11-08 DIAGNOSIS — Z98.890 OTHER SPECIFIED POSTPROCEDURAL STATES: Chronic | ICD-10-CM

## 2018-11-08 PROBLEM — M51.36 OTHER INTERVERTEBRAL DISC DEGENERATION, LUMBAR REGION: Chronic | Status: ACTIVE | Noted: 2017-06-03

## 2018-11-08 PROBLEM — I89.0 LYMPHEDEMA, NOT ELSEWHERE CLASSIFIED: Chronic | Status: ACTIVE | Noted: 2017-06-11

## 2018-11-08 PROBLEM — E05.90 THYROTOXICOSIS, UNSPECIFIED WITHOUT THYROTOXIC CRISIS OR STORM: Chronic | Status: INACTIVE | Noted: 2017-06-03 | Resolved: 2018-11-08

## 2018-11-08 LAB
ALBUMIN SERPL ELPH-MCNC: 3.5 G/DL — SIGNIFICANT CHANGE UP (ref 3.3–5.2)
ALP SERPL-CCNC: 72 U/L — SIGNIFICANT CHANGE UP (ref 40–120)
ALT FLD-CCNC: 7 U/L — SIGNIFICANT CHANGE UP
ANION GAP SERPL CALC-SCNC: 10 MMOL/L — SIGNIFICANT CHANGE UP (ref 5–17)
APPEARANCE UR: CLEAR — SIGNIFICANT CHANGE UP
APTT BLD: 33.9 SEC — SIGNIFICANT CHANGE UP (ref 27.5–36.3)
APTT BLD: 36.6 SEC — HIGH (ref 27.5–36.3)
AST SERPL-CCNC: 10 U/L — SIGNIFICANT CHANGE UP
BACTERIA # UR AUTO: ABNORMAL
BASOPHILS # BLD AUTO: 0 K/UL — SIGNIFICANT CHANGE UP (ref 0–0.2)
BASOPHILS NFR BLD AUTO: 0.2 % — SIGNIFICANT CHANGE UP (ref 0–2)
BILIRUB SERPL-MCNC: 0.3 MG/DL — LOW (ref 0.4–2)
BILIRUB UR-MCNC: NEGATIVE — SIGNIFICANT CHANGE UP
BUN SERPL-MCNC: 17 MG/DL — SIGNIFICANT CHANGE UP (ref 8–20)
CALCIUM SERPL-MCNC: 9.4 MG/DL — SIGNIFICANT CHANGE UP (ref 8.6–10.2)
CHLORIDE SERPL-SCNC: 100 MMOL/L — SIGNIFICANT CHANGE UP (ref 98–107)
CO2 SERPL-SCNC: 27 MMOL/L — SIGNIFICANT CHANGE UP (ref 22–29)
COLOR SPEC: YELLOW — SIGNIFICANT CHANGE UP
CREAT SERPL-MCNC: 0.89 MG/DL — SIGNIFICANT CHANGE UP (ref 0.5–1.3)
DIFF PNL FLD: NEGATIVE — SIGNIFICANT CHANGE UP
EOSINOPHIL # BLD AUTO: 0 K/UL — SIGNIFICANT CHANGE UP (ref 0–0.5)
EOSINOPHIL NFR BLD AUTO: 0.4 % — SIGNIFICANT CHANGE UP (ref 0–5)
GLUCOSE SERPL-MCNC: 109 MG/DL — SIGNIFICANT CHANGE UP (ref 70–115)
GLUCOSE UR QL: NEGATIVE MG/DL — SIGNIFICANT CHANGE UP
HCT VFR BLD CALC: 39 % — LOW (ref 42–52)
HCT VFR BLD CALC: 39.8 % — LOW (ref 42–52)
HGB BLD-MCNC: 12.5 G/DL — LOW (ref 14–18)
HGB BLD-MCNC: 12.7 G/DL — LOW (ref 14–18)
INR BLD: 1.05 RATIO — SIGNIFICANT CHANGE UP (ref 0.88–1.16)
KETONES UR-MCNC: NEGATIVE — SIGNIFICANT CHANGE UP
LEUKOCYTE ESTERASE UR-ACNC: NEGATIVE — SIGNIFICANT CHANGE UP
LYMPHOCYTES # BLD AUTO: 1 K/UL — SIGNIFICANT CHANGE UP (ref 1–4.8)
LYMPHOCYTES # BLD AUTO: 10.1 % — LOW (ref 20–55)
MAGNESIUM SERPL-MCNC: 2.1 MG/DL — SIGNIFICANT CHANGE UP (ref 1.6–2.6)
MAGNESIUM SERPL-MCNC: 2.2 MG/DL — SIGNIFICANT CHANGE UP (ref 1.8–2.6)
MCHC RBC-ENTMCNC: 26.2 PG — LOW (ref 27–31)
MCHC RBC-ENTMCNC: 26.2 PG — LOW (ref 27–31)
MCHC RBC-ENTMCNC: 31.9 G/DL — LOW (ref 32–36)
MCHC RBC-ENTMCNC: 32.1 G/DL — SIGNIFICANT CHANGE UP (ref 32–36)
MCV RBC AUTO: 81.8 FL — SIGNIFICANT CHANGE UP (ref 80–94)
MCV RBC AUTO: 82.2 FL — SIGNIFICANT CHANGE UP (ref 80–94)
MONOCYTES # BLD AUTO: 0.8 K/UL — SIGNIFICANT CHANGE UP (ref 0–0.8)
MONOCYTES NFR BLD AUTO: 7.4 % — SIGNIFICANT CHANGE UP (ref 3–10)
NEUTROPHILS # BLD AUTO: 8.4 K/UL — HIGH (ref 1.8–8)
NEUTROPHILS NFR BLD AUTO: 81.6 % — HIGH (ref 37–73)
NITRITE UR-MCNC: NEGATIVE — SIGNIFICANT CHANGE UP
PH UR: 7 — SIGNIFICANT CHANGE UP (ref 5–8)
PHOSPHATE SERPL-MCNC: 3 MG/DL — SIGNIFICANT CHANGE UP (ref 2.4–4.7)
PHOSPHATE SERPL-MCNC: 3.3 MG/DL — SIGNIFICANT CHANGE UP (ref 2.4–4.7)
PLATELET # BLD AUTO: 230 K/UL — SIGNIFICANT CHANGE UP (ref 150–400)
PLATELET # BLD AUTO: 259 K/UL — SIGNIFICANT CHANGE UP (ref 150–400)
POTASSIUM SERPL-MCNC: 4.8 MMOL/L — SIGNIFICANT CHANGE UP (ref 3.5–5.3)
POTASSIUM SERPL-SCNC: 4.8 MMOL/L — SIGNIFICANT CHANGE UP (ref 3.5–5.3)
PROT SERPL-MCNC: 6.5 G/DL — LOW (ref 6.6–8.7)
PROT UR-MCNC: NEGATIVE MG/DL — SIGNIFICANT CHANGE UP
PROTHROM AB SERPL-ACNC: 12.1 SEC — SIGNIFICANT CHANGE UP (ref 10–12.9)
RBC # BLD: 4.77 M/UL — SIGNIFICANT CHANGE UP (ref 4.6–6.2)
RBC # BLD: 4.84 M/UL — SIGNIFICANT CHANGE UP (ref 4.6–6.2)
RBC # FLD: 16.4 % — HIGH (ref 11–15.6)
RBC # FLD: 16.6 % — HIGH (ref 11–15.6)
RBC CASTS # UR COMP ASSIST: ABNORMAL /HPF (ref 0–4)
SODIUM SERPL-SCNC: 137 MMOL/L — SIGNIFICANT CHANGE UP (ref 135–145)
SP GR SPEC: 1.01 — SIGNIFICANT CHANGE UP (ref 1.01–1.02)
T3 SERPL-MCNC: 82 NG/DL — SIGNIFICANT CHANGE UP (ref 80–200)
T4 FREE SERPL-MCNC: 1.5 NG/DL — SIGNIFICANT CHANGE UP (ref 0.9–1.8)
TROPONIN T SERPL-MCNC: 0.05 NG/ML — SIGNIFICANT CHANGE UP (ref 0–0.06)
TROPONIN T SERPL-MCNC: 0.08 NG/ML — HIGH (ref 0–0.06)
UROBILINOGEN FLD QL: 1 MG/DL
WBC # BLD: 10.3 K/UL — SIGNIFICANT CHANGE UP (ref 4.8–10.8)
WBC # BLD: 9.8 K/UL — SIGNIFICANT CHANGE UP (ref 4.8–10.8)
WBC # FLD AUTO: 10.3 K/UL — SIGNIFICANT CHANGE UP (ref 4.8–10.8)
WBC # FLD AUTO: 9.8 K/UL — SIGNIFICANT CHANGE UP (ref 4.8–10.8)
WBC UR QL: SIGNIFICANT CHANGE UP

## 2018-11-08 PROCEDURE — 93010 ELECTROCARDIOGRAM REPORT: CPT

## 2018-11-08 PROCEDURE — 99232 SBSQ HOSP IP/OBS MODERATE 35: CPT | Mod: GC

## 2018-11-08 PROCEDURE — 12345: CPT | Mod: NC

## 2018-11-08 RX ORDER — METHADONE HYDROCHLORIDE 40 MG/1
1 TABLET ORAL
Qty: 0 | Refills: 0 | COMMUNITY

## 2018-11-08 RX ORDER — POTASSIUM CHLORIDE 20 MEQ
40 PACKET (EA) ORAL ONCE
Qty: 0 | Refills: 0 | Status: COMPLETED | OUTPATIENT
Start: 2018-11-08 | End: 2018-11-08

## 2018-11-08 RX ORDER — METOPROLOL TARTRATE 50 MG
25 TABLET ORAL
Qty: 0 | Refills: 0 | Status: DISCONTINUED | OUTPATIENT
Start: 2018-11-08 | End: 2018-11-10

## 2018-11-08 RX ORDER — LEVOTHYROXINE SODIUM 125 MCG
200 TABLET ORAL DAILY
Qty: 0 | Refills: 0 | Status: DISCONTINUED | OUTPATIENT
Start: 2018-11-08 | End: 2018-11-10

## 2018-11-08 RX ORDER — FENTANYL CITRATE 50 UG/ML
1 INJECTION INTRAVENOUS
Qty: 0 | Refills: 0 | Status: DISCONTINUED | OUTPATIENT
Start: 2018-11-08 | End: 2018-11-10

## 2018-11-08 RX ORDER — NICOTINE POLACRILEX 2 MG
1 GUM BUCCAL DAILY
Qty: 0 | Refills: 0 | Status: DISCONTINUED | OUTPATIENT
Start: 2018-11-08 | End: 2018-11-10

## 2018-11-08 RX ORDER — METHADONE HYDROCHLORIDE 40 MG/1
10 TABLET ORAL EVERY 8 HOURS
Qty: 0 | Refills: 0 | Status: DISCONTINUED | OUTPATIENT
Start: 2018-11-08 | End: 2018-11-08

## 2018-11-08 RX ORDER — METHADONE HYDROCHLORIDE 40 MG/1
10 TABLET ORAL
Qty: 0 | Refills: 0 | Status: DISCONTINUED | OUTPATIENT
Start: 2018-11-08 | End: 2018-11-10

## 2018-11-08 RX ORDER — FENTANYL CITRATE 50 UG/ML
25 INJECTION INTRAVENOUS ONCE
Qty: 0 | Refills: 0 | Status: DISCONTINUED | OUTPATIENT
Start: 2018-11-08 | End: 2018-11-08

## 2018-11-08 RX ORDER — SODIUM CHLORIDE 9 MG/ML
2000 INJECTION, SOLUTION INTRAVENOUS ONCE
Qty: 0 | Refills: 0 | Status: COMPLETED | OUTPATIENT
Start: 2018-11-08 | End: 2018-11-08

## 2018-11-08 RX ORDER — HEPARIN SODIUM 5000 [USP'U]/ML
5000 INJECTION INTRAVENOUS; SUBCUTANEOUS ONCE
Qty: 0 | Refills: 0 | Status: COMPLETED | OUTPATIENT
Start: 2018-11-08 | End: 2018-11-08

## 2018-11-08 RX ORDER — ASPIRIN/CALCIUM CARB/MAGNESIUM 324 MG
325 TABLET ORAL ONCE
Qty: 0 | Refills: 0 | Status: COMPLETED | OUTPATIENT
Start: 2018-11-08 | End: 2018-11-08

## 2018-11-08 RX ORDER — ENOXAPARIN SODIUM 100 MG/ML
40 INJECTION SUBCUTANEOUS DAILY
Qty: 0 | Refills: 0 | Status: DISCONTINUED | OUTPATIENT
Start: 2018-11-08 | End: 2018-11-08

## 2018-11-08 RX ORDER — SUCRALFATE 1 G
1 TABLET ORAL
Qty: 0 | Refills: 0 | Status: DISCONTINUED | OUTPATIENT
Start: 2018-11-08 | End: 2018-11-10

## 2018-11-08 RX ORDER — ALPRAZOLAM 0.25 MG
0.5 TABLET ORAL THREE TIMES A DAY
Qty: 0 | Refills: 0 | Status: DISCONTINUED | OUTPATIENT
Start: 2018-11-08 | End: 2018-11-10

## 2018-11-08 RX ORDER — HEPARIN SODIUM 5000 [USP'U]/ML
INJECTION INTRAVENOUS; SUBCUTANEOUS
Qty: 25000 | Refills: 0 | Status: DISCONTINUED | OUTPATIENT
Start: 2018-11-08 | End: 2018-11-09

## 2018-11-08 RX ORDER — ATORVASTATIN CALCIUM 80 MG/1
40 TABLET, FILM COATED ORAL AT BEDTIME
Qty: 0 | Refills: 0 | Status: DISCONTINUED | OUTPATIENT
Start: 2018-11-08 | End: 2018-11-10

## 2018-11-08 RX ORDER — HEPARIN SODIUM 5000 [USP'U]/ML
6000 INJECTION INTRAVENOUS; SUBCUTANEOUS EVERY 6 HOURS
Qty: 0 | Refills: 0 | Status: DISCONTINUED | OUTPATIENT
Start: 2018-11-08 | End: 2018-11-09

## 2018-11-08 RX ADMIN — Medication 1 PATCH: at 19:25

## 2018-11-08 RX ADMIN — FENTANYL CITRATE 1 PATCH: 50 INJECTION INTRAVENOUS at 18:26

## 2018-11-08 RX ADMIN — FENTANYL CITRATE 1 PATCH: 50 INJECTION INTRAVENOUS at 03:55

## 2018-11-08 RX ADMIN — Medication 0.5 MILLIGRAM(S): at 14:12

## 2018-11-08 RX ADMIN — Medication 1 PATCH: at 11:41

## 2018-11-08 RX ADMIN — METHADONE HYDROCHLORIDE 10 MILLIGRAM(S): 40 TABLET ORAL at 14:12

## 2018-11-08 RX ADMIN — METHADONE HYDROCHLORIDE 10 MILLIGRAM(S): 40 TABLET ORAL at 11:41

## 2018-11-08 RX ADMIN — Medication 325 MILLIGRAM(S): at 03:54

## 2018-11-08 RX ADMIN — Medication 25 MILLIGRAM(S): at 18:28

## 2018-11-08 RX ADMIN — FENTANYL CITRATE 25 MICROGRAM(S): 50 INJECTION INTRAVENOUS at 19:16

## 2018-11-08 RX ADMIN — METHADONE HYDROCHLORIDE 10 MILLIGRAM(S): 40 TABLET ORAL at 18:54

## 2018-11-08 RX ADMIN — Medication 0.5 MILLIGRAM(S): at 08:00

## 2018-11-08 RX ADMIN — METHADONE HYDROCHLORIDE 10 MILLIGRAM(S): 40 TABLET ORAL at 22:35

## 2018-11-08 RX ADMIN — ATORVASTATIN CALCIUM 40 MILLIGRAM(S): 80 TABLET, FILM COATED ORAL at 22:35

## 2018-11-08 RX ADMIN — HEPARIN SODIUM 1300 UNIT(S)/HR: 5000 INJECTION INTRAVENOUS; SUBCUTANEOUS at 14:06

## 2018-11-08 RX ADMIN — Medication 1 PATCH: at 19:19

## 2018-11-08 RX ADMIN — HEPARIN SODIUM 6000 UNIT(S): 5000 INJECTION INTRAVENOUS; SUBCUTANEOUS at 14:11

## 2018-11-08 RX ADMIN — HEPARIN SODIUM 1000 UNIT(S)/HR: 5000 INJECTION INTRAVENOUS; SUBCUTANEOUS at 05:39

## 2018-11-08 RX ADMIN — FENTANYL CITRATE 25 MICROGRAM(S): 50 INJECTION INTRAVENOUS at 18:25

## 2018-11-08 RX ADMIN — METHADONE HYDROCHLORIDE 10 MILLIGRAM(S): 40 TABLET ORAL at 03:53

## 2018-11-08 RX ADMIN — Medication 0.5 MILLIGRAM(S): at 01:04

## 2018-11-08 RX ADMIN — Medication 25 MILLIGRAM(S): at 03:54

## 2018-11-08 RX ADMIN — SODIUM CHLORIDE 2000 MILLILITER(S): 9 INJECTION, SOLUTION INTRAVENOUS at 00:38

## 2018-11-08 RX ADMIN — Medication 200 MICROGRAM(S): at 08:00

## 2018-11-08 RX ADMIN — Medication 40 MILLIEQUIVALENT(S): at 00:38

## 2018-11-08 RX ADMIN — HEPARIN SODIUM 5000 UNIT(S): 5000 INJECTION INTRAVENOUS; SUBCUTANEOUS at 05:41

## 2018-11-08 NOTE — PROGRESS NOTE ADULT - SUBJECTIVE AND OBJECTIVE BOX
Subjective:  Pt was seen and examined at bedside. pt was found to have troponin of 0.08 without ST changes on EKG so he was started on Heparin drip for NSTEMI 2/2 demand ischemia brought on by SVT. o/n pt states he didn't sleep well, c/o of twitching sensations in legs. pt denies any active chest pain/SOB. pt received his fentanyl patch which helps decrease his generalized body pain    Vital Signs Last 24 Hrs  T(C): 36.6 (2018 07:05), Max: 36.7 (2018 18:53)  T(F): 97.9 (2018 07:05), Max: 98 (2018 18:53)  HR: 84 (2018 07:05) (84 - 104)  BP: 154/84 (2018 07:05) (126/66 - 162/94)  RR: 20 (2018 07:05) (20 - 24)  SpO2: 96% (2018 07:05) (96% - 99%)    GEN - sleeping on his rt side, obese, pleasant. appears slightly anxious  HEENT - NCAT, EOMI, CA, no JVD/bruit.  RESP - CTA BL, no wheeze/stridor/rhonchi/crackles. not on supplemental O2.  CARDIO - NS1S2, RRR. No murmurs/rubs/gallops.  ABD - Soft/Non tender/Non distended, obese abd. Normal BS x4 quadrants.   Ext - Lt LE - chronic lymphedema stocking in place. Rt LE - non edematous, non tender, warm, no signs of venous/arterial stasis ulcers  MSK - full ROM of BL upper and lower extremities without pain or restriction. BL 5/5 strength on upper and lower extremities.                           12.5   10.3  )-----------( 230      ( 2018 02:18 )             39.0   11-08    137  |  100  |  17.0  ----------------------------<  109  4.8   |  27.0  |  0.89    Ca    9.4      2018 02:18  Phos  3.0     11-08  Mg     2.2     11-08    TPro  6.5<L>  /  Alb  3.5  /  TBili  0.3<L>  /  DBili  x   /  AST  10  /  ALT  7   /  AlkPhos  72  11-08    CARDIAC MARKERS ( 2018 02:19 )  x     / 0.08 ng/mL / x     / x     / x      CARDIAC MARKERS ( 2018 19:10 )  x     / <0.01 ng/mL / 28 U/L / x     / x        PT/INR - ( 2018 05:00 )   PT: 12.1 sec;   INR: 1.05 ratio    PTT - ( 2018 05:00 )  PTT:33.9 sec    Urinalysis Basic - ( 2018 06:23 )    Color: Yellow / Appearance: Clear / S.010 / pH: x  Gluc: x / Ketone: Negative  / Bili: Negative / Urobili: 1 mg/dL   Blood: x / Protein: Negative mg/dL / Nitrite: Negative   Leuk Esterase: Negative / RBC: 3-5 /HPF / WBC 0-2   Sq Epi: x / Non Sq Epi: x / Bacteria: Occasional Patient is a 68y old  Male who presents with a chief complaint of chest pain/SVT (2018 17:51)    Subjective:  Pt was seen and examined at bedside. pt was found to have troponin of 0.08 without ST changes on EKG so he was started on Heparin drip for NSTEMI 2/2 demand ischemia brought on by SVT. o/n pt states he didn't sleep well, c/o of twitching sensations in legs. pt denies any active chest pain/SOB. pt received his fentanyl patch which helps decrease his generalized body pain States that he feels warm during exam    ROS: No light headedness/dizziness, difficulty breathing/cough, fevers/chills, abdominal pain, n/v, diarrhea/constipation, dysuria or increased urinary frequency. All other ROS negative     TELE: no events     Vital Signs Last 24 Hrs  T(C): 36.6 (2018 07:05), Max: 36.7 (2018 18:53)  T(F): 97.9 (2018 07:05), Max: 98 (2018 18:53)  HR: 84 (2018 07:05) (84 - 104)  BP: 154/84 (2018 07:05) (126/66 - 162/94)  RR: 20 (2018 07:05) (20 - 24)  SpO2: 96% (2018 07:05) (96% - 99%)    GEN - sleeping on his rt side, obese, pleasant. appears slightly anxious  HEENT - NCAT, EOMI, CA, no JVD/bruit.  RESP - CTA BL, no wheeze/stridor/rhonchi/crackles. not on supplemental O2.  CARDIO - NS1S2, RRR. No murmurs/rubs/gallops.  ABD - Soft/Non tender/Non distended, obese abd. Normal BS x4 quadrants.   Ext - Lt LE - chronic lymphedema stocking in place. Rt LE - non edematous, non tender, warm, no signs of venous/arterial stasis ulcers  MSK - full ROM of BL upper and lower extremities without pain or restriction. BL 5/5 strength on upper and lower extremities.   Skin: grossly intact                        12.5   10.3  )-----------( 230      ( 2018 02:18 )             39.0   11-08    137  |  100  |  17.0  ----------------------------<  109  4.8   |  27.0  |  0.89    Ca    9.4      2018 02:18  Phos  3.0     11-08  Mg     2.2     11-08    TPro  6.5<L>  /  Alb  3.5  /  TBili  0.3<L>  /  DBili  x   /  AST  10  /  ALT  7   /  AlkPhos  72  11-08    CARDIAC MARKERS ( 2018 02:19 )  x     / 0.08 ng/mL / x     / x     / x      CARDIAC MARKERS ( 2018 19:10 )  x     / <0.01 ng/mL / 28 U/L / x     / x        PT/INR - ( 2018 05:00 )   PT: 12.1 sec;   INR: 1.05 ratio    PTT - ( 2018 05:00 )  PTT:33.9 sec    Urinalysis Basic - ( 2018 06:23 )    Color: Yellow / Appearance: Clear / S.010 / pH: x  Gluc: x / Ketone: Negative  / Bili: Negative / Urobili: 1 mg/dL   Blood: x / Protein: Negative mg/dL / Nitrite: Negative   Leuk Esterase: Negative / RBC: 3-5 /HPF / WBC 0-2   Sq Epi: x / Non Sq Epi: x / Bacteria: Occasional

## 2018-11-08 NOTE — CONSULT NOTE ADULT - SUBJECTIVE AND OBJECTIVE BOX
[facility], [facility]                                   [address] [place] [address]                                                      PHONE: [phone]                                                         FAX: [phone]  [email or web address]  ---------------------------------------------------------------------------------------------------------------------------------    Reason for Consult: CP SVT    HPI:  BLAS SUAZO is an 68y Male active smoker chronic back pain presenting to [facility] with severe CP and palpitations found to be on SVT at arrival converted after he received adenosine by ED with improvement of his symptoms. Had been started on IV heparin after his second trop was found to be 0.08. He had evidence of anteroseptal Q waves     PAST MEDICAL & SURGICAL HISTORY:  Pleural effusion on right  Hypothyroidism, unspecified type  Tobacco dependence due to cigarettes  Lymphedema of left lower extremity  DDD (degenerative disc disease), lumbar  S/P right knee surgery  S/P inguinal hernia repair  S/P rotator cuff repair  H/O Spinal surgery      Darvon (Swelling)  penicillin (Swelling)      MEDICATIONS  (STANDING):  atorvastatin 40 milliGRAM(s) Oral at bedtime  fentaNYL   Patch  75 MICROgram(s)/Hr 1 Patch Transdermal every 72 hours  heparin  Infusion.  Unit(s)/Hr (10 mL/Hr) IV Continuous <Continuous>  levothyroxine 200 MICROGram(s) Oral daily  methadone    Tablet 10 milliGRAM(s) Oral five times a day  metoprolol tartrate 25 milliGRAM(s) Oral two times a day  nicotine -  14 mG/24Hr(s) Patch 1 patch Transdermal daily    MEDICATIONS  (PRN):  ALPRAZolam 0.5 milliGRAM(s) Oral three times a day PRN anxiety  heparin  Injectable 6000 Unit(s) IV Push every 6 hours PRN For aPTT less than 40  sucralfate 1 Gram(s) Oral two times a day PRN constipation      Social History:  Cigarettes:                    Alchohol:                 Illicit Drug Abuse:      REVIEW OF SYSTEMS:    Constitutional: No fever, weight loss or fatigue  Eyes: No eye pain, visual disturbances, or discharge  ENMT:  No difficulty hearing, tinnitus, vertigo; No sinus or throat pain  Neck: No pain or stiffness  Respiratory: No cough, wheezing, chills or hemoptysis  Cardiovascular: No chest pain, palpitations, shortness of breath, dizziness or leg swelling  Gastrointestinal: No abdominal or epigastric pain. No nausea, vomiting or hematemesis; No diarrhea or constipation. No melena or hematochezia.  Genitourinary: No dysuria, frequency, hematuria or incontinence  Rectal: No pain, hemorrhoids or incontinence  Neurological: No headaches, memory loss, loss of strength, numbness or tremors  Skin: No itching, burning, rashes or lesions   Lymph Nodes: No enlarged glands  Endocrine: No heat or cold intolerance; No hair loss  Musculoskeletal: No joint pain or swelling; No muscle, back or extremity pain  Psychiatric: No depression, anxiety, mood swings or difficulty sleeping  Heme/Lymph: No easy bruising or bleeding gums  Allergy and Immunologic: No hives or eczema    Vital Signs Last 24 Hrs  T(C): 36.6 (2018 07:05), Max: 36.7 (2018 18:53)  T(F): 97.9 (2018 07:05), Max: 98 (2018 18:53)  HR: 84 (2018 07:05) (84 - 104)  BP: 154/84 (2018 07:05) (126/66 - 162/94)  BP(mean): --  RR: 20 (2018 07:05) (20 - 24)  SpO2: 96% (2018 07:05) (96% - 99%)  ICU Vital Signs Last 24 Hrs  BLAS SUAZO  I&O's Detail    I&O's Summary    Drug Dosing Weight  BLAS SUAZO      PHYSICAL EXAM:  General: Appears well developed, well nourished alert and cooperative.  HEENT: Head; normocephalic, atraumatic.  Eyes: Pupils reactive, cornea wnl.  Neck: Supple, no nodes adenopathy, no NVD or carotid bruit or thyromegaly.  CARDIOVASCULAR: Normal S1 and S2, No murmur, rub, gallop or lift.   LUNGS: No rales, rhonchi or wheeze. Normal breath sounds bilaterally.  ABDOMEN: Soft, nontender without mass or organomegaly. bowel sounds normoactive.  EXTREMITIES: No clubbing, cyanosis or edema. Distal pulses wnl.   SKIN: warm and dry with normal turgor.  NEURO: Alert/oriented x 3/normal motor exam. No pathologic reflexes.    PSYCH: normal affect.        LABS:                        12.5   10.3  )-----------( 230      ( 2018 02:18 )             39.0     11-08    137  |  100  |  17.0  ----------------------------<  109  4.8   |  27.0  |  0.89    Ca    9.4      2018 02:18  Phos  3.0     11-08  Mg     2.2     11-08    TPro  6.5<L>  /  Alb  3.5  /  TBili  0.3<L>  /  DBili  x   /  AST  10  /  ALT  7   /  AlkPhos  72  11-08    BLAS GABRIELE  CARDIAC MARKERS ( 2018 08:10 )  x     / 0.05 ng/mL / x     / x     / x      CARDIAC MARKERS ( 2018 02:19 )  x     / 0.08 ng/mL / x     / x     / x      CARDIAC MARKERS ( 2018 19:10 )  x     / <0.01 ng/mL / 28 U/L / x     / x          PT/INR - ( 2018 05:00 )   PT: 12.1 sec;   INR: 1.05 ratio         PTT - ( 2018 05:00 )  PTT:33.9 sec  Urinalysis Basic - ( 2018 06:23 )    Color: Yellow / Appearance: Clear / S.010 / pH: x  Gluc: x / Ketone: Negative  / Bili: Negative / Urobili: 1 mg/dL   Blood: x / Protein: Negative mg/dL / Nitrite: Negative   Leuk Esterase: Negative / RBC: 3-5 /HPF / WBC 0-2   Sq Epi: x / Non Sq Epi: x / Bacteria: Occasional        RADIOLOGY & ADDITIONAL STUDIES:    INTERPRETATION OF TELEMETRY (personally reviewed):    ECG: NSR AS Q waves    ECHO: P        ACTIVE PROBLEMS:  HEALTH ISSUES - PROBLEM Dx:          Assessment and Plan:    In summary,     BLAS SUAZO is an 68y Male active smoker chronic back pain presenting to [facility] with severe CP and palpitations found to be on SVT at arrival converted after he received adenosine by ED with improvement of his symptoms. Had been started on IV heparin after his second trop was found to be 0.08. He had evidence of anteroseptal Q waves     Telemetry monitoring  will proceed with cardiac cath today CAD evaluation  2DEchocardiogram  continue metoprolol 25 mg po BID  Smoking cessation discussed with the patient in detail    ESSIE DECKER MD, FACC, [facility]IZ[facility]

## 2018-11-08 NOTE — ED ADULT NURSE REASSESSMENT NOTE - NS ED NURSE REASSESS COMMENT FT1
pt rec'd from ED, A+Ox4, laying down in bed, denies CP, c/o SOB, pt placed back on 2L NC.  Pt states, "when I have chest pain I can't describe the feeling, my body just feels weird and achy". IV #18 in L AC, fluids running.  Pt states he has chronic back and neck pain since 1999 that he takes methadone for and fentanyl patches that get placed on his abdomen.   Pt has a compression stocking on left leg which he states he wears for lymphedema, no swelling currently with stocking.  Pt has scar on rt knee which he states is from cartilage surgery.  CM placed on patient, CB within reach.  Belongings checked, pt rec'd with cell phone and jacket on bed.  Offered to place pt cell phone on bedside table since the patient was sitting on top of it, but pt wanted to keep it in the bed.  Advised pt to use call bell before getting OOB. pt rec'd from ED, A+Ox4, laying down in bed, denies CP, c/o SOB, pt placed back on 2L NC.  Pt states, "when I have chest pain I can't describe the feeling, my body just feels weird and achy". IV #18 in L AC, fluids running.  Pt states he has chronic back and neck pain since 1999 that he takes methadone for and fentanyl patches that get placed on his abdomen.   Pt has a compression stocking on left leg which he states he wears for lymphedema, no swelling currently with stocking.  Pt has scar on rt knee which he states is from cartilage surgery.  CM placed on patient, CB within reach.  Belongings checked, pt rec'd with cell phone and jacket on bed.  Offered to place pt cell phone on bedside table since the patient was sitting on top of it, but pt wanted to keep it in the bed.  Advised pt to use call bell before getting OOB, states he walks with a cane at home but doesn't not feel well enough to walk very far here, but can stand up at the bedside with help.
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
## 2018-11-08 NOTE — CHART NOTE - NSCHARTNOTEFT_GEN_A_CORE
Patient w/ elevated trop 0.08, seen and evaluated at bedside. EKG repeated.   Patient c/o L shoulder pain which he states is chronic due to rotator cuff injury, no nausea, vomiting, his chest pain is improved, states he is just having his chronic leg pain in his b/l legs. No diaphoresis or SOB. He does have mild light-headedness, but improved since earlier during ambulance ride.     T(C): 36.7 (11-07-18 @ 21:36), Max: 36.7 (11-07-18 @ 18:53)  HR: 92 (11-08-18 @ 00:28) (91 - 104)  BP: 139/77 (11-08-18 @ 00:28) (134/81 - 162/94)  RR: 24 (11-08-18 @ 00:28) (20 - 24)  SpO2: 98% (11-08-18 @ 00:28) (96% - 99%)  Wt(kg): --    Physical Exam:   GENERAL: well-groomed, well-developed, NAD  HEENT: head NC/AT; EOM intact, conjunctiva & sclera clear; hearing grossly intact, moist mucous membranes  NECK: supple, no JVD  RESPIRATORY: CTA B/L, no wheezing, rales, rhonchi or rubs  CARDIOVASCULAR: S1&S2, RRR, no murmurs or gallops  ABDOMEN: soft, non-tender, non-distended, + Bowel sounds x4 quadrants  SKIN: warm and dry, color normal  NEUROLOGIC: AA&O X3, CN2-12 grossly intact w/ no focal deficits, no sensory loss, motor Strength 5/5 in UE & LE B/L  Psych: Normal mood and affect, normal behavior    Plan:   NSTEMI possibly type 2 in setting of demand ischemia due to previous SVT and sinus tachy:   ACS protocol. Start heparin drip ACS protocol. ASA [id number], lipitor and lopressor.   TTE already ordered.   -cardiology consulted.   Repeat EKG is NSR though with slidely wide QRS and peaked T waves, K 4.8. No acute ST elevation or depression.   Vital signs stable, 126/66 is BP and other vitals normal.     Recheck PTT and start heparin drip pending repeat PTT. Plan of care discussed with RACHEL Westbrook. Patient w/ elevated trop 0.08, seen and evaluated at bedside. EKG repeated.   Patient c/o L shoulder pain which he states is chronic due to rotator cuff injury, no nausea, vomiting, his chest pain is improved, states he is just having his chronic leg pain in his b/l legs. No diaphoresis or SOB. He does have mild light-headedness, but improved since earlier during ambulance ride.     T(C): 36.7 (11-07-18 @ 21:36), Max: 36.7 (11-07-18 @ 18:53)  HR: 92 (11-08-18 @ 00:28) (91 - 104)  BP: 139/77 (11-08-18 @ 00:28) (134/81 - 162/94)  RR: 24 (11-08-18 @ 00:28) (20 - 24)  SpO2: 98% (11-08-18 @ 00:28) (96% - 99%)  Wt(kg): --    Physical Exam:   GENERAL: well-groomed, well-developed, NAD  HEENT: head NC/AT; EOM intact, conjunctiva & sclera clear; hearing grossly intact, moist mucous membranes  NECK: supple, no JVD  RESPIRATORY: CTA B/L, no wheezing, rales, rhonchi or rubs  CARDIOVASCULAR: S1&S2, RRR, no murmurs or gallops  ABDOMEN: soft, non-tender, non-distended, + Bowel sounds x4 quadrants  SKIN: warm and dry, color normal  NEUROLOGIC: AA&O X3, CN2-12 grossly intact w/ no focal deficits, no sensory loss, motor Strength 5/5 in UE & LE B/L  Psych: Normal mood and affect, normal behavior    Plan:   NSTEMI possibly type 2 in setting of demand ischemia due to previous SVT and sinus tachy:   ACS protocol. Start heparin drip ACS protocol. ASA [id number], lipitor and lopressor.   TTE already ordered.   -cardiology consulted.   Repeat EKG is NSR though with slidely wide QRS and peaked T waves, ? q wave in V1-2, K 4.8. No acute ST elevation or depression.   Vital signs stable, 126/66 is BP and other vitals normal.     Recheck PTT and start heparin drip pending repeat PTT. Plan of care discussed with RACHEL Westbrook.  Risks and benefits of ASA and heparin discussed. patien agreaable, no hx of ICH.GI bleed, has previously only been on subcut heparin he states. Patient w/ elevated trop 0.08, seen and evaluated at bedside. EKG repeated.   Patient c/o L shoulder pain which he states is chronic due to rotator cuff injury, no nausea, vomiting, his chest pain is improved, states he is just having his chronic leg pain in his b/l legs. No diaphoresis or SOB. He does have mild light-headedness, but improved since earlier during ambulance ride.     T(C): 36.7 (11-07-18 @ 21:36), Max: 36.7 (11-07-18 @ 18:53)  HR: 92 (11-08-18 @ 00:28) (91 - 104)  BP: 139/77 (11-08-18 @ 00:28) (134/81 - 162/94)  RR: 24 (11-08-18 @ 00:28) (20 - 24)  SpO2: 98% (11-08-18 @ 00:28) (96% - 99%)  Wt(kg): --    Physical Exam:   GENERAL: well-groomed, well-developed, NAD  HEENT: head NC/AT; EOM intact, conjunctiva & sclera clear; hearing grossly intact, moist mucous membranes  NECK: supple, no JVD  RESPIRATORY: CTA B/L, no wheezing, rales, rhonchi or rubs  CARDIOVASCULAR: S1&S2, RRR, no murmurs or gallops  ABDOMEN: soft, non-tender, non-distended, + Bowel sounds x4 quadrants  SKIN: warm and dry, color normal  NEUROLOGIC: AA&O X3, CN2-12 grossly intact w/ no focal deficits, no sensory loss, motor Strength 5/5 in UE & LE B/L  Psych: Normal mood and affect, normal behavior    Plan:   NSTEMI possibly type 2 in setting of demand ischemia due to previous SVT and sinus tachy:   ACS protocol. Start heparin drip ACS protocol. ASA [id number], lipitor and lopressor.   TTE already ordered.   -cardiology consulted.   Repeat EKG is NSR though with slidely wide QRS and peaked T waves, q wave in V1-2, K 4.8. No acute ST elevation or depression. Previous EKG from April 2013 viewed on [facility] also had q waves  Vital signs stable, 126/66 is BP and other vitals normal.     Recheck PTT and start heparin drip pending repeat PTT. Plan of care discussed with RACHEL Westbrook.  Risks and benefits of ASA and heparin discussed. patien agreaable, no hx of ICH.GI bleed, has previously only been on subcut heparin he states.

## 2018-11-08 NOTE — PATIENT PROFILE ADULT - DO YOU FEEL UNSAFE AT SCHOOL?
no

## 2018-11-08 NOTE — PATIENT PROFILE ADULT - VISION (WITH CORRECTIVE LENSES IF THE PATIENT USUALLY WEARS THEM):
Problem: Patient Care Overview  Goal: Plan of Care Review  Outcome: Ongoing (interventions implemented as appropriate)  POC reviewed with pt, pt verbalizes understanding. AAOx4. VSS. Pt up walking around independently during shift. R flank pain treated and controlled with prn pain meds. Pt in much better spirits now that he is eating, understands to be NPO at midnight. LBM 2/7/17. IV fluids infusing. No N/V. No falls or injuries during shift, side rails up x2, call bell in reach. WCTM.        Normal vision: sees adequately in most situations; can see medication labels, newsprint

## 2018-11-08 NOTE — PROGRESS NOTE ADULT - ATTENDING COMMENTS
Note addended where needed. Plan discussed with patient at bedside and nurse. Plan also d/w cardio this am
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
## 2018-11-08 NOTE — PROGRESS NOTE ADULT - SUBJECTIVE AND OBJECTIVE BOX
Cardiology NP preprocedure note:     BLAS SUAZO is a 68y Male active smoker chronic back pain presenting to [facility] with severe CP and palpitations found to be on SVT at arrival converted after he received adenosine by ED with improvement of his symptoms. Had been started on IV heparin after his second trop was found to be 0.08. He had evidence of anteroseptal Q waves   He presents to [facility] for LHC for CAD evaluation and possible intervention.    ASA 3  Mallampati 2  Discontinue Heparin infusion on call to procedure room  OSWALD MEHTA Cardiology NP preprocedure note:     BLAS SUAZO is a 68y Male active smoker chronic back pain presenting to [facility] with severe CP and palpitations found to be on SVT at arrival converted after he received adenosine by ED with improvement of his symptoms. Had been started on IV heparin after his second trop was found to be 0.08. He had evidence of anteroseptal Q waves   He presents to [facility] for LHC for CAD evaluation and possible intervention.    ASA 3  Mallampati 2  Discontinue Heparin infusion on call to procedure room  GFR WNL    Addendum:    Procedure cancelled secondary to MD unavailable.  Rescheduled for am with patient verbal understanding.  NPO after midnight  Continue heparin infusion as previously ordered

## 2018-11-09 ENCOUNTER — TRANSCRIPTION ENCOUNTER (OUTPATIENT)
Age: 68
End: 2018-11-09

## 2018-11-09 DIAGNOSIS — R07.9 CHEST PAIN, UNSPECIFIED: ICD-10-CM

## 2018-11-09 LAB
ALBUMIN SERPL ELPH-MCNC: 3.9 G/DL — SIGNIFICANT CHANGE UP (ref 3.3–5.2)
ALP SERPL-CCNC: 72 U/L — SIGNIFICANT CHANGE UP (ref 40–120)
ALT FLD-CCNC: 9 U/L — SIGNIFICANT CHANGE UP
ANION GAP SERPL CALC-SCNC: 10 MMOL/L — SIGNIFICANT CHANGE UP (ref 5–17)
APTT BLD: 49.7 SEC — HIGH (ref 27.5–36.3)
APTT BLD: 68 SEC — HIGH (ref 27.5–36.3)
AST SERPL-CCNC: 12 U/L — SIGNIFICANT CHANGE UP
BILIRUB SERPL-MCNC: 0.3 MG/DL — LOW (ref 0.4–2)
BUN SERPL-MCNC: 15 MG/DL — SIGNIFICANT CHANGE UP (ref 8–20)
CALCIUM SERPL-MCNC: 9.6 MG/DL — SIGNIFICANT CHANGE UP (ref 8.6–10.2)
CHLORIDE SERPL-SCNC: 99 MMOL/L — SIGNIFICANT CHANGE UP (ref 98–107)
CHOLEST SERPL-MCNC: 177 MG/DL — SIGNIFICANT CHANGE UP (ref 110–199)
CO2 SERPL-SCNC: 27 MMOL/L — SIGNIFICANT CHANGE UP (ref 22–29)
CREAT SERPL-MCNC: 0.82 MG/DL — SIGNIFICANT CHANGE UP (ref 0.5–1.3)
GLUCOSE SERPL-MCNC: 79 MG/DL — SIGNIFICANT CHANGE UP (ref 70–115)
HBA1C BLD-MCNC: 5.4 % — SIGNIFICANT CHANGE UP (ref 4–5.6)
HCT VFR BLD CALC: 40.2 % — LOW (ref 42–52)
HCT VFR BLD CALC: 40.9 % — LOW (ref 42–52)
HDLC SERPL-MCNC: 35 MG/DL — LOW
HGB BLD-MCNC: 12.6 G/DL — LOW (ref 14–18)
HGB BLD-MCNC: 13 G/DL — LOW (ref 14–18)
LIPID PNL WITH DIRECT LDL SERPL: 116 MG/DL — SIGNIFICANT CHANGE UP
MAGNESIUM SERPL-MCNC: 2.3 MG/DL — SIGNIFICANT CHANGE UP (ref 1.6–2.6)
MCHC RBC-ENTMCNC: 25.9 PG — LOW (ref 27–31)
MCHC RBC-ENTMCNC: 26.1 PG — LOW (ref 27–31)
MCHC RBC-ENTMCNC: 31.3 G/DL — LOW (ref 32–36)
MCHC RBC-ENTMCNC: 31.8 G/DL — LOW (ref 32–36)
MCV RBC AUTO: 82 FL — SIGNIFICANT CHANGE UP (ref 80–94)
MCV RBC AUTO: 82.5 FL — SIGNIFICANT CHANGE UP (ref 80–94)
PHOSPHATE SERPL-MCNC: 3.3 MG/DL — SIGNIFICANT CHANGE UP (ref 2.4–4.7)
PLATELET # BLD AUTO: 229 K/UL — SIGNIFICANT CHANGE UP (ref 150–400)
PLATELET # BLD AUTO: 239 K/UL — SIGNIFICANT CHANGE UP (ref 150–400)
POTASSIUM SERPL-MCNC: 3.9 MMOL/L — SIGNIFICANT CHANGE UP (ref 3.5–5.3)
POTASSIUM SERPL-SCNC: 3.9 MMOL/L — SIGNIFICANT CHANGE UP (ref 3.5–5.3)
PROT SERPL-MCNC: 7.2 G/DL — SIGNIFICANT CHANGE UP (ref 6.6–8.7)
RBC # BLD: 4.87 M/UL — SIGNIFICANT CHANGE UP (ref 4.6–6.2)
RBC # BLD: 4.99 M/UL — SIGNIFICANT CHANGE UP (ref 4.6–6.2)
RBC # FLD: 16.6 % — HIGH (ref 11–15.6)
RBC # FLD: 16.7 % — HIGH (ref 11–15.6)
SODIUM SERPL-SCNC: 136 MMOL/L — SIGNIFICANT CHANGE UP (ref 135–145)
TOTAL CHOLESTEROL/HDL RATIO MEASUREMENT: 5 RATIO — SIGNIFICANT CHANGE UP (ref 3.4–9.6)
TRIGL SERPL-MCNC: 131 MG/DL — SIGNIFICANT CHANGE UP (ref 10–200)
WBC # BLD: 10.4 K/UL — SIGNIFICANT CHANGE UP (ref 4.8–10.8)
WBC # BLD: 7.8 K/UL — SIGNIFICANT CHANGE UP (ref 4.8–10.8)
WBC # FLD AUTO: 10.4 K/UL — SIGNIFICANT CHANGE UP (ref 4.8–10.8)
WBC # FLD AUTO: 7.8 K/UL — SIGNIFICANT CHANGE UP (ref 4.8–10.8)

## 2018-11-09 PROCEDURE — 99233 SBSQ HOSP IP/OBS HIGH 50: CPT | Mod: GC

## 2018-11-09 PROCEDURE — 99497 ADVNCD CARE PLAN 30 MIN: CPT | Mod: GC

## 2018-11-09 PROCEDURE — 93306 TTE W/DOPPLER COMPLETE: CPT | Mod: 26

## 2018-11-09 RX ORDER — NICOTINE POLACRILEX 2 MG
0 GUM BUCCAL
Qty: 0 | Refills: 0 | DISCHARGE
Start: 2018-11-09

## 2018-11-09 RX ORDER — METOPROLOL TARTRATE 50 MG
1 TABLET ORAL
Qty: 60 | Refills: 0
Start: 2018-11-09 | End: 2018-12-08

## 2018-11-09 RX ORDER — ATORVASTATIN CALCIUM 80 MG/1
1 TABLET, FILM COATED ORAL
Qty: 30 | Refills: 0 | OUTPATIENT
Start: 2018-11-09 | End: 2018-12-08

## 2018-11-09 RX ORDER — ASPIRIN/CALCIUM CARB/MAGNESIUM 324 MG
1 TABLET ORAL
Qty: 30 | Refills: 0
Start: 2018-11-09 | End: 2018-12-08

## 2018-11-09 RX ORDER — SODIUM CHLORIDE 9 MG/ML
1000 INJECTION INTRAMUSCULAR; INTRAVENOUS; SUBCUTANEOUS
Qty: 0 | Refills: 0 | Status: DISCONTINUED | OUTPATIENT
Start: 2018-11-09 | End: 2018-11-10

## 2018-11-09 RX ORDER — ATORVASTATIN CALCIUM 80 MG/1
1 TABLET, FILM COATED ORAL
Qty: 30 | Refills: 0
Start: 2018-11-09 | End: 2018-12-08

## 2018-11-09 RX ORDER — ONDANSETRON 8 MG/1
4 TABLET, FILM COATED ORAL ONCE
Qty: 0 | Refills: 0 | Status: COMPLETED | OUTPATIENT
Start: 2018-11-09 | End: 2018-11-09

## 2018-11-09 RX ORDER — ASPIRIN/CALCIUM CARB/MAGNESIUM 324 MG
81 TABLET ORAL DAILY
Qty: 0 | Refills: 0 | Status: DISCONTINUED | OUTPATIENT
Start: 2018-11-10 | End: 2018-11-10

## 2018-11-09 RX ORDER — ASPIRIN/CALCIUM CARB/MAGNESIUM 324 MG
81 TABLET ORAL ONCE
Qty: 0 | Refills: 0 | Status: COMPLETED | OUTPATIENT
Start: 2018-11-09 | End: 2018-11-09

## 2018-11-09 RX ORDER — METOPROLOL TARTRATE 50 MG
1 TABLET ORAL
Qty: 60 | Refills: 0 | OUTPATIENT
Start: 2018-11-09 | End: 2018-12-08

## 2018-11-09 RX ORDER — ONDANSETRON 8 MG/1
4 TABLET, FILM COATED ORAL EVERY 6 HOURS
Qty: 0 | Refills: 0 | Status: DISCONTINUED | OUTPATIENT
Start: 2018-11-09 | End: 2018-11-10

## 2018-11-09 RX ADMIN — METHADONE HYDROCHLORIDE 10 MILLIGRAM(S): 40 TABLET ORAL at 05:25

## 2018-11-09 RX ADMIN — SODIUM CHLORIDE 75 MILLILITER(S): 9 INJECTION INTRAMUSCULAR; INTRAVENOUS; SUBCUTANEOUS at 16:48

## 2018-11-09 RX ADMIN — ATORVASTATIN CALCIUM 40 MILLIGRAM(S): 80 TABLET, FILM COATED ORAL at 20:49

## 2018-11-09 RX ADMIN — FENTANYL CITRATE 1 PATCH: 50 INJECTION INTRAVENOUS at 20:35

## 2018-11-09 RX ADMIN — Medication 0.5 MILLIGRAM(S): at 10:46

## 2018-11-09 RX ADMIN — METHADONE HYDROCHLORIDE 10 MILLIGRAM(S): 40 TABLET ORAL at 13:03

## 2018-11-09 RX ADMIN — Medication 0.5 MILLIGRAM(S): at 22:16

## 2018-11-09 RX ADMIN — METHADONE HYDROCHLORIDE 10 MILLIGRAM(S): 40 TABLET ORAL at 16:08

## 2018-11-09 RX ADMIN — SODIUM CHLORIDE 75 MILLILITER(S): 9 INJECTION INTRAMUSCULAR; INTRAVENOUS; SUBCUTANEOUS at 16:47

## 2018-11-09 RX ADMIN — Medication 200 MICROGRAM(S): at 05:25

## 2018-11-09 RX ADMIN — Medication 0.5 MILLIGRAM(S): at 00:02

## 2018-11-09 RX ADMIN — Medication 1 PATCH: at 20:35

## 2018-11-09 RX ADMIN — Medication 81 MILLIGRAM(S): at 08:18

## 2018-11-09 RX ADMIN — SODIUM CHLORIDE 75 MILLILITER(S): 9 INJECTION INTRAMUSCULAR; INTRAVENOUS; SUBCUTANEOUS at 20:50

## 2018-11-09 RX ADMIN — METHADONE HYDROCHLORIDE 10 MILLIGRAM(S): 40 TABLET ORAL at 20:47

## 2018-11-09 RX ADMIN — Medication 1 PATCH: at 13:03

## 2018-11-09 RX ADMIN — ONDANSETRON 4 MILLIGRAM(S): 8 TABLET, FILM COATED ORAL at 20:36

## 2018-11-09 RX ADMIN — FENTANYL CITRATE 1 PATCH: 50 INJECTION INTRAVENOUS at 10:51

## 2018-11-09 RX ADMIN — Medication 25 MILLIGRAM(S): at 05:26

## 2018-11-09 RX ADMIN — Medication 25 MILLIGRAM(S): at 17:46

## 2018-11-09 RX ADMIN — HEPARIN SODIUM 1500 UNIT(S)/HR: 5000 INJECTION INTRAVENOUS; SUBCUTANEOUS at 00:57

## 2018-11-09 NOTE — PROGRESS NOTE ADULT - SUBJECTIVE AND OBJECTIVE BOX
Subjective:  Presents  for [facility]     Medications:  ALPRAZolam 0.5 milliGRAM(s) Oral three times a day PRN  atorvastatin 40 milliGRAM(s) Oral at bedtime  fentaNYL   Patch  75 MICROgram(s)/Hr 1 Patch Transdermal every 72 hours  heparin  Infusion.  Unit(s)/Hr IV Continuous <Continuous>  heparin  Injectable 6000 Unit(s) IV Push every 6 hours PRN  levothyroxine 200 MICROGram(s) Oral daily  methadone    Tablet 10 milliGRAM(s) Oral five times a day  metoprolol tartrate 25 milliGRAM(s) Oral two times a day  nicotine -  14 mG/24Hr(s) Patch 1 patch Transdermal daily  sucralfate 1 Gram(s) Oral two times a day PRN      PHYSICAL EXAM:  Vital Signs Last 24 Hrs  T(C): 36.7 (2018 05:24), Max: 36.9 (2018 20:02)  T(F): 98 (2018 05:24), Max: 98.5 (2018 20:02)  HR: 65 (2018 07:22) (65 - 94)  BP: 150/81 (2018 07:22) (110/64 - 150/81)  BP(mean): --  RR: 17 (2018 07:22) (14 - 20)  SpO2: 97% (2018 07:22) (96% - 98%)  Daily     Daily Weight in k.1 (2018 06:00)  I&O's Detail    2018 07:01  -  2018 07:00  --------------------------------------------------------  IN:    heparin  Infusion.: 172 mL  Total IN: 172 mL    OUT:    Voided: 600 mL  Total OUT: 600 mL    Total NET: -428 mL          General: A/ox 3, No acute Distress  Neck: Supple, NO JVD  Cardiac: S1 S2, No M/R/G  Pulmonary: CTAB, Breathing unlabored, No Rhonchi/Rales/Wheezing  Abdomen: Soft, Non -tender, +BS   Extremities: No Rashes, No edema  Neuro: A/o x 3, No focal deficits  Psch: normal mood , normal affect    LABS:                          12.6   7.8   )-----------( 229      ( 2018 05:53 )             40.2     11-    137  |  100  |  17.0  ----------------------------<  109  4.8   |  27.0  |  0.89    Ca    9.4      2018 02:18  Phos  3.0     -  Mg     2.2         TPro  6.5<L>  /  Alb  3.5  /  TBili  0.3<L>  /  DBili  x   /  AST  10  /  ALT  7   /  AlkPhos  72  11-08    PT/INR - ( 2018 05:00 )   PT: 12.1 sec;   INR: 1.05 ratio         PTT - ( 2018 07:23 )  PTT:68.0 sec Subjective:  Presents  for [facility]     Medications:  ALPRAZolam 0.5 milliGRAM(s) Oral three times a day PRN  atorvastatin 40 milliGRAM(s) Oral at bedtime  fentaNYL   Patch  75 MICROgram(s)/Hr 1 Patch Transdermal every 72 hours  heparin  Infusion.  Unit(s)/Hr IV Continuous <Continuous>  heparin  Injectable 6000 Unit(s) IV Push every 6 hours PRN  levothyroxine 200 MICROGram(s) Oral daily  methadone    Tablet 10 milliGRAM(s) Oral five times a day  metoprolol tartrate 25 milliGRAM(s) Oral two times a day  nicotine -  14 mG/24Hr(s) Patch 1 patch Transdermal daily  sucralfate 1 Gram(s) Oral two times a day PRN      PHYSICAL EXAM:  Vital Signs Last 24 Hrs  T(C): 36.7 (2018 05:24), Max: 36.9 (2018 20:02)  T(F): 98 (2018 05:24), Max: 98.5 (2018 20:02)  HR: 65 (2018 07:22) (65 - 94)  BP: 150/81 (2018 07:22) (110/64 - 150/81)  BP(mean): --  RR: 17 (2018 07:22) (14 - 20)  SpO2: 97% (2018 07:22) (96% - 98%)  Daily     Daily Weight in k.1 (2018 06:00)  I&O's Detail    2018 07:01  -  2018 07:00  --------------------------------------------------------  IN:    heparin  Infusion.: 172 mL  Total IN: 172 mL    OUT:    Voided: 600 mL  Total OUT: 600 mL    Total NET: -428 mL          General: A/ox 3, No acute Distress  Neck: Supple, NO JVD  Cardiac: S1 S2, No M/R/G  Pulmonary: CTAB, Breathing unlabored, No Rhonchi/Rales/Wheezing  Abdomen: Soft, Non -tender, +BS   Extremities: No Rashes,  Left leg  edema chronic   Neuro: A/o x 3, No focal deficits  Psch: normal mood , normal affect    Mallampati 2 ASA 2   LABS:                          12.6   7.8   )-----------( 229      ( 2018 05:53 )             40.2         137  |  100  |  17.0  ----------------------------<  109  4.8   |  27.0  |  0.89    Ca    9.4      2018 02:18  Phos  3.0       Mg     2.2         TPro  6.5<L>  /  Alb  3.5  /  TBili  0.3<L>  /  DBili  x   /  AST  10  /  ALT  7   /  AlkPhos  72  11-08    PT/INR - ( 2018 05:00 )   PT: 12.1 sec;   INR: 1.05 ratio         PTT - ( 2018 07:23 )  PTT:68.0 sec

## 2018-11-09 NOTE — PROGRESS NOTE ADULT - SUBJECTIVE AND OBJECTIVE BOX
Patient complained of feeling nausea x 3 days, increased past cardiac cath today, + bm ox 2, abd soft per RN assessment, spoke with MD and recommended to hold discharge, stat labs and repeat in am, and normal saline 75 overnight. Patient complained of feeling nausea x 3 days, increased past cardiac cath today, + bm ox 2, abd soft per RN assessment, spoke with MD and recommended to hold discharge, stat labs and repeat in am, and normal saline 75 overnight.    BMP pending, night PA aware to follow up on results.

## 2018-11-09 NOTE — PROGRESS NOTE ADULT - ASSESSMENT
This is a 68 year old male with chronic pain  COPD presented with SVT   and chest pain  mildly elevated troponin post adenosine  injection   s/p LHC with  Via RFA   CORONARY VESSELS: R dominant.  Large RCA w/ minimal disease.  Large LAD w/ mild distal disease.  Moderate sized LCx w/ mild mid vessel disease.  AORTA: Mildly dilated asc aorta w/ trace AI.  COMPLICATIONS: No complications occurred during the cath lab visit.  DIAGNOSTIC IMPRESSIONS: Nonobstructive CAD .Preserved EF w/ min MR.Mildly dilated asc aorta w/ trace AI.SVT.

## 2018-11-09 NOTE — DISCHARGE NOTE ADULT - CARE PLAN
Principal Discharge DX:	Chest pain, unspecified type  Goal:	Recovered and stable  Assessment and plan of treatment:	You were noted to have chest pain either on arrival or during your hospitalization. Tests to evaluate your heart including blood tests called troponins, EKGs and cardiac catheterization were performed and fortunately you do NOT have signs of heart attack based on these studies. If you have however been instructed to follow up with a Cardiologist for further work up or continued management, it is extremely important that you do so in order to lower your risk of having a heart attack in the future. If you have been prescribed medications for heart health, it is very important that you ALWAYS take them and do not stop doing so unless instructed by a healthcare provider.  Secondary Diagnosis:	Electrolyte abnormality  Goal:	Resolved  Assessment and plan of treatment:	You were noticed to have blood work with some abnormalities. At this time, you are safe to leave the hospital, we do not suspect that anything immediately will come of these findings, but it is something that should be followed to see if it is getting better, staying the same, or getting worse. Be sure to discuss this at your follow up visit with your Primary Care Doctor to have these tests repeated and to see what work up, if any, is necessary to continue managing it.  Secondary Diagnosis:	Hypothyroidism, unspecified type  Goal:	Stable  Assessment and plan of treatment:	You have hypothyroidism. For this condition, it is important to continue taking medication as prescribed. If your dose of thyroid replacement medication has been changed, be sure to have your Primary Care Doctor or Endocronologist repeat blood work to check your Thyroid function in 6 weeks to make sure that your current dose does not need to be changed again to reach the best possible level for you.  Secondary Diagnosis:	Tobacco dependence due to cigarettes  Goal:	Stopped smoking  Assessment and plan of treatment:	It is very important for your health that you stop smoking! There are many different ways to do so, nicotine patches, gum, and much more! Please discuss your various options with your Primary care doctor. There are also many online resources and hotlines that you can call. Feel free to request more information.  Secondary Diagnosis:	Chronic back pain, unspecified back location, unspecified back pain laterality  Goal:	Stable  Assessment and plan of treatment:	If you experience numbness in your perianal region, sudden incontinence of stool or urine, urinary retention, bilateral shooting pain from your spine to he back of you lower extremities, please call 9-1-1, go the emergency or call your doctor immediately  If you are unable to carry out daily activities, eating, bathing, dressing, using the bathroom because of pain due to your back pain, please call your doctor or go the emergency room immediately  Please take your medication as prescribed. Please follow up with [facility] regularly.  Secondary Diagnosis:	Constipation, unspecified constipation type  Goal:	Stable  Assessment and plan of treatment:	You were noted to have troubles with constipation. This may be related to your recent illness or the fact that you were in the hospital and eating things you may not always eat. However, if you do have issues with constipation, you may take over the counter stool softeners such as colace daily. In addition, from time to time you may add laxative medications such as Miralax and later senna. Over the counter enemas can also be used as needed. Be sure to drink lots of water daily, at least 8 glasses, unless you have been advised to be on a fluid restricted diet. If you note that you are relying on laxatives too often, be sure to discuss this with your Primary Care Doctor or see a Gastroenterologist to avoid causing your body to become dependent on laxative medications.  Secondary Diagnosis:	Obesity (BMI 30.0-34.9)  Goal:	BMI < 25  Assessment and plan of treatment:	Follow a healthy calorie restricted diet. Try to participate in physical activity regularly, at least 150min per week. You may benefit from following up with a nutritionist. Be sure to follow up with your Primary doctor regularly for weight checks to track your progress and to discuss other options for weight loss. Principal Discharge DX:	Chest pain due to coronary artery disease  Goal:	Recovered and stable  Assessment and plan of treatment:	You were noted to have chest pain either on arrival or during your hospitalization. Tests to evaluate your heart including blood tests called troponins, EKGs and cardiac catheterization were performed and fortunately you do NOT have signs of heart attack based on these studies. If you have however been instructed to follow up with a Cardiologist for further work up or continued management, it is extremely important that you do so in order to lower your risk of having a heart attack in the future. If you have been prescribed medications for heart health, it is very important that you ALWAYS take them and do not stop doing so unless instructed by a healthcare provider.  Secondary Diagnosis:	Electrolyte abnormality  Goal:	Resolved  Assessment and plan of treatment:	You were noticed to have blood work with some abnormalities. At this time, you are safe to leave the hospital, we do not suspect that anything immediately will come of these findings, but it is something that should be followed to see if it is getting better, staying the same, or getting worse. Be sure to discuss this at your follow up visit with your Primary Care Doctor to have these tests repeated and to see what work up, if any, is necessary to continue managing it.  Secondary Diagnosis:	Hypothyroidism, unspecified type  Goal:	Stable  Assessment and plan of treatment:	You have hypothyroidism. For this condition, it is important to continue taking medication as prescribed. If your dose of thyroid replacement medication has been changed, be sure to have your Primary Care Doctor or Endocronologist repeat blood work to check your Thyroid function in 6 weeks to make sure that your current dose does not need to be changed again to reach the best possible level for you.  Secondary Diagnosis:	Tobacco dependence due to cigarettes  Goal:	Stopped smoking  Assessment and plan of treatment:	It is very important for your health that you stop smoking! There are many different ways to do so, nicotine patches, gum, and much more! Please discuss your various options with your Primary care doctor. There are also many online resources and hotlines that you can call. Feel free to request more information.  Secondary Diagnosis:	Chronic back pain, unspecified back location, unspecified back pain laterality  Goal:	Stable  Assessment and plan of treatment:	If you experience numbness in your perianal region, sudden incontinence of stool or urine, urinary retention, bilateral shooting pain from your spine to he back of you lower extremities, please call 9-1-1, go the emergency or call your doctor immediately  If you are unable to carry out daily activities, eating, bathing, dressing, using the bathroom because of pain due to your back pain, please call your doctor or go the emergency room immediately  Please take your medication as prescribed. Please follow up with [facility] regularly.  Secondary Diagnosis:	Constipation, unspecified constipation type  Goal:	Stable  Assessment and plan of treatment:	You were noted to have troubles with constipation. This may be related to your recent illness or the fact that you were in the hospital and eating things you may not always eat. However, if you do have issues with constipation, you may take over the counter stool softeners such as colace daily. In addition, from time to time you may add laxative medications such as Miralax and later senna. Over the counter enemas can also be used as needed. Be sure to drink lots of water daily, at least 8 glasses, unless you have been advised to be on a fluid restricted diet. If you note that you are relying on laxatives too often, be sure to discuss this with your Primary Care Doctor or see a Gastroenterologist to avoid causing your body to become dependent on laxative medications.  Secondary Diagnosis:	Obesity (BMI 30.0-34.9)  Goal:	BMI < 25  Assessment and plan of treatment:	Follow a healthy calorie restricted diet. Try to participate in physical activity regularly, at least 150min per week. You may benefit from following up with a nutritionist. Be sure to follow up with your Primary doctor regularly for weight checks to track your progress and to discuss other options for weight loss. Principal Discharge DX:	Chest pain, unspecified type  Goal:	Recovered and stable  Assessment and plan of treatment:	You were noted to have chest pain either on arrival or during your hospitalization. Tests to evaluate your heart including blood tests called troponins, EKGs and cardiac catheterization were performed and fortunately you do NOT have signs of heart attack based on these studies. If you have however been instructed to follow up with a Cardiologist for further work up or continued management, it is extremely important that you do so in order to lower your risk of having a heart attack in the future. If you have been prescribed medications for heart health, it is very important that you ALWAYS take them and do not stop doing so unless instructed by a healthcare provider.  Secondary Diagnosis:	Electrolyte abnormality  Goal:	Resolved  Assessment and plan of treatment:	You were noticed to have blood work with some abnormalities. At this time, you are safe to leave the hospital, we do not suspect that anything immediately will come of these findings, but it is something that should be followed to see if it is getting better, staying the same, or getting worse. Be sure to discuss this at your follow up visit with your Primary Care Doctor to have these tests repeated and to see what work up, if any, is necessary to continue managing it.  Secondary Diagnosis:	Hypothyroidism, unspecified type  Goal:	Stable  Assessment and plan of treatment:	You have hypothyroidism. For this condition, it is important to continue taking medication as prescribed. If your dose of thyroid replacement medication has been changed, be sure to have your Primary Care Doctor or Endocronologist repeat blood work to check your Thyroid function in 6 weeks to make sure that your current dose does not need to be changed again to reach the best possible level for you.  Secondary Diagnosis:	Tobacco dependence due to cigarettes  Goal:	Stopped smoking  Assessment and plan of treatment:	It is very important for your health that you stop smoking! There are many different ways to do so, nicotine patches, gum, and much more! Please discuss your various options with your Primary care doctor. There are also many online resources and hotlines that you can call. Feel free to request more information.  Secondary Diagnosis:	Chronic back pain, unspecified back location, unspecified back pain laterality  Goal:	Stable  Assessment and plan of treatment:	If you experience numbness in your perianal region, sudden incontinence of stool or urine, urinary retention, bilateral shooting pain from your spine to he back of you lower extremities, please call 9-1-1, go the emergency or call your doctor immediately  If you are unable to carry out daily activities, eating, bathing, dressing, using the bathroom because of pain due to your back pain, please call your doctor or go the emergency room immediately  Please take your medication as prescribed. Please follow up with [facility] regularly.  Goal:	obesity 30-34.9  Assessment and plan of treatment:	Follow a healthy calorie restricted diet. Try to participate in physical activity regularly, at least 150min per week. You may benefit from following up with a nutritionist. Be sure to follow up with your Primary doctor regularly for weight checks to track your progress and to discuss other options for weight loss.  Secondary Diagnosis:	SVT (supraventricular tachycardia)  Assessment and plan of treatment:	follow up with Electrophysiology cardiology Dr. Arrieta as out patient

## 2018-11-09 NOTE — PROGRESS NOTE ADULT - PROBLEM SELECTOR PLAN 1
PRE-PROCEDURE ASSESSMENT  [facility] due to elevated troponin   -Patient seen and examined  -Labs reviewed  -Pre-procedure teaching completed with patient   -Informed consent witnessed  -Questions answered  - ASA 81 mg po given

## 2018-11-09 NOTE — DISCHARGE NOTE ADULT - CARE PROVIDER_API CALL
Adonis Ivory), Family Medicine  [address] [place] [address]  Phone: [phone]  Fax: [phone]    Brian Arrieta), Cardiovascular Disease; Interventional Cardiology  [address] [place] [address]  Phone: [phone]  Fax: [phone]

## 2018-11-09 NOTE — PROGRESS NOTE ADULT - ASSESSMENT
68y Male active smoker chronic back pain presenting to [facility] with severe CP and palpitations found to be on SVT at arrival converted after he received adenosine by ED with improvement of his symptoms. Had been started on IV heparin after his second trop was found to be 0.08. He had evidence of anteroseptal Q waves    Presents today for [facility] 68y Male active smoker chronic back pain presenting to [facility] with severe CP and palpitations found to be on SVT at arrival converted after he received adenosine by ED with improvement of his symptoms. Had been started on IV heparin after his second trop was found to be 0.08. He had evidence of anteroseptal Q waves  Recommended to have Cardiac cath     Presents today for [facility]   Currently has no chest pain but does report base  chronic back pain and neck pain

## 2018-11-09 NOTE — CDI QUERY NOTE - NSCDIOTHERTXTBX_GEN_ALL_CORE_HH
Progress notes:  Electrolyte disturbance - slight hyponatremic, hypochloremic, K+ 3.7  - electrolytes replenished    Na 11/7 = 134  IV plasmalyte A bolus 2000 cc x 1 on 11/8  Na 11/8 = 137

## 2018-11-09 NOTE — PROGRESS NOTE ADULT - SUBJECTIVE AND OBJECTIVE BOX
Patient is a 68y old  Male who presents with a chief complaint of chest pain/SVT (2018 17:51)    Subjective:  Pt was seen and examined at bedside. Pt went for cardiac cath today, states felling tired but denies any chest pain or palpitations, SOB.   ROS: No light headedness/dizziness, difficulty breathing/cough, fevers/chills, abdominal pain, n/v, diarrhea/constipation, dysuria or increased urinary frequency. All other ROS negative     TELE: no events     Vital Signs Last 24 Hrs  T(C): 36.6 (2018 07:05), Max: 36.7 (2018 18:53)  T(F): 97.9 (2018 07:05), Max: 98 (2018 18:53)  HR: 84 (2018 07:05) (84 - 104)  BP: 154/84 (2018 07:05) (126/66 - 162/94)  RR: 20 (2018 07:05) (20 - 24)  SpO2: 96% (2018 07:05) (96% - 99%)    GEN - sleeping on his rt side, obese, pleasant. appears slightly anxious  HEENT - NCAT, EOMI, CA, no JVD/bruit.  RESP - CTA BL, no wheeze/stridor/rhonchi/crackles. not on supplemental O2.  CARDIO - NS1S2, RRR. No murmurs/rubs/gallops.  ABD - Soft/Non tender/Non distended, obese abd. Normal BS x4 quadrants.   Ext - Lt LE - chronic lymphedema stocking in place. Rt LE - non edematous, non tender, warm, no signs of venous/arterial stasis ulcers  MSK - full ROM of BL upper and lower extremities without pain or restriction. BL 5/5 strength on upper and lower extremities.   Skin: grossly intact                        12.5   10.3  )-----------( 230      ( 2018 02:18 )             39.0   11-08    137  |  100  |  17.0  ----------------------------<  109  4.8   |  27.0  |  0.89    Ca    9.4      2018 02:18  Phos  3.0     11-08  Mg     2.2     11-    TPro  6.5<L>  /  Alb  3.5  /  TBili  0.3<L>  /  DBili  x   /  AST  10  /  ALT  7   /  AlkPhos  72  11-08    CARDIAC MARKERS ( 2018 02:19 )  x     / 0.08 ng/mL / x     / x     / x      CARDIAC MARKERS ( 2018 19:10 )  x     / <0.01 ng/mL / 28 U/L / x     / x        PT/INR - ( 2018 05:00 )   PT: 12.1 sec;   INR: 1.05 ratio    PTT - ( 2018 05:00 )  PTT:33.9 sec    Urinalysis Basic - ( 2018 06:23 )    Color: Yellow / Appearance: Clear / S.010 / pH: x  Gluc: x / Ketone: Negative  / Bili: Negative / Urobili: 1 mg/dL   Blood: x / Protein: Negative mg/dL / Nitrite: Negative   Leuk Esterase: Negative / RBC: 3-5 /HPF / WBC 0-2   Sq Epi: x / Non Sq Epi: x / Bacteria: Occasional Patient is a 68y old  Male who presents with a chief complaint of chest pain/SVT (2018 17:51)    Subjective:  Pt was seen and examined at bedside. Pt went for cardiac cath today, states felling tired but denies any chest pain or palpitations, SOB. Pt is tolerating PO without nausea, vomiting or abdominal pain.    ROS: No light headedness/dizziness, difficulty breathing/cough, fevers/chills, abdominal pain, n/v, diarrhea/constipation, dysuria or increased urinary frequency. All other ROS negative     TELE: no events     Vital Signs Last 24 Hrs  T(C): 36.6 (2018 07:05), Max: 36.7 (2018 18:53)  T(F): 97.9 (2018 07:05), Max: 98 (2018 18:53)  HR: 84 (2018 07:05) (84 - 104)  BP: 154/84 (2018 07:05) (126/66 - 162/94)  RR: 20 (2018 07:05) (20 - 24)  SpO2: 96% (2018 07:05) (96% - 99%)    GEN - sleeping on his rt side, obese, pleasant. appears slightly anxious  HEENT - NCAT, EOMI, CA, no JVD/bruit.  RESP - CTA BL, no wheeze/stridor/rhonchi/crackles. not on supplemental O2.  CARDIO - NS1S2, RRR. No murmurs/rubs/gallops.  ABD - Soft/Non tender/Non distended, obese abd. Normal BS x4 quadrants.   Ext - Lt LE - chronic lymphedema stocking in place. Rt LE - non edematous, non tender, warm, no signs of venous/arterial stasis ulcers  MSK - full ROM of BL upper and lower extremities without pain or restriction. BL 5/5 strength on upper and lower extremities.   Skin: grossly intact                        12.5   10.3  )-----------( 230      ( 2018 02:18 )             39.0   11-    137  |  100  |  17.0  ----------------------------<  109  4.8   |  27.0  |  0.89    Ca    9.4      2018 02:18  Phos  3.0     11-08  Mg     2.2     11-08    TPro  6.5<L>  /  Alb  3.5  /  TBili  0.3<L>  /  DBili  x   /  AST  10  /  ALT  7   /  AlkPhos  72  11-08    CARDIAC MARKERS ( 2018 02:19 )  x     / 0.08 ng/mL / x     / x     / x      CARDIAC MARKERS ( 2018 19:10 )  x     / <0.01 ng/mL / 28 U/L / x     / x        PT/INR - ( 2018 05:00 )   PT: 12.1 sec;   INR: 1.05 ratio    PTT - ( 2018 05:00 )  PTT:33.9 sec    Urinalysis Basic - ( 2018 06:23 )    Color: Yellow / Appearance: Clear / S.010 / pH: x  Gluc: x / Ketone: Negative  / Bili: Negative / Urobili: 1 mg/dL   Blood: x / Protein: Negative mg/dL / Nitrite: Negative   Leuk Esterase: Negative / RBC: 3-5 /HPF / WBC 0-2   Sq Epi: x / Non Sq Epi: x / Bacteria: Occasional
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
## 2018-11-09 NOTE — PROGRESS NOTE ADULT - SUBJECTIVE AND OBJECTIVE BOX
Nurse Practitioner Progress note:     HPI:  68y Male active smoker chronic back pain presenting to [facility] with severe CP and palpitations found to be on SVT at arrival converted after he received adenosine by ED with improvement of his symptoms. Had been started on IV heparin after his second trop was found to be 0.08.   Pt presented to Cardiac cath lab for LHC   s/p LHC tolerated procedure well       T(C): 36.7 (11-09-18 @ 05:24), Max: 36.9 (11-08-18 @ 20:02)  HR: 66 (11-09-18 @ 08:58) (65 - 94)  BP: 147/76 (11-09-18 @ 08:58) (110/64 - 150/81)  RR: 17 (11-09-18 @ 08:58) (14 - 20)  SpO2: 98% (11-09-18 @ 08:58) (96% - 98%)      PHYSICAL EXAM:  Neurologic: Non-focal, AxOx3.  No neuro deficits  Vascular: Peripheral pulses palpable 2+ bilaterally  Procedure Site: R FA access with Mynx  device with hemostasis maintained + PP           MEDICATIONS  (STANDING):  aspirin enteric coated 81 milliGRAM(s) Oral once  atorvastatin 40 milliGRAM(s) Oral at bedtime  fentaNYL   Patch  75 MICROgram(s)/Hr 1 Patch Transdermal every 72 hours  heparin  Infusion.  Unit(s)/Hr (10 mL/Hr) IV Continuous <Continuous>  levothyroxine 200 MICROGram(s) Oral daily  methadone    Tablet 10 milliGRAM(s) Oral five times a day  metoprolol tartrate 25 milliGRAM(s) Oral two times a day  nicotine -  14 mG/24Hr(s) Patch 1 patch Transdermal daily

## 2018-11-09 NOTE — CDI QUERY NOTE - NSCDIOTHERTXTBX2_GEN_ALL_CORE_FT
Chloride 11/7 = 95               11/8 - 100      Please provide diagnoses for above abn. lab results and treatment if clinically significant.  Hyponatremic and hypochloridemia cannot be coded.    Thank you

## 2018-11-09 NOTE — DISCHARGE NOTE ADULT - SECONDARY DIAGNOSIS.
Electrolyte abnormality Hypothyroidism, unspecified type Tobacco dependence due to cigarettes Chronic back pain, unspecified back location, unspecified back pain laterality Constipation, unspecified constipation type Obesity (BMI 30.0-34.9) SVT (supraventricular tachycardia)

## 2018-11-09 NOTE — CHART NOTE - NSCHARTNOTEFT_GEN_A_CORE
patient seen and evaluated at bedside with rn and resident.   feels 'drained'  also felt nauseous earlier received x 1 dose zofran / feels dehydrated.   denies cp    Vital Signs Last 24 Hrs  T(C): 36.7 (09 Nov 2018 15:30), Max: 36.9 (08 Nov 2018 20:02)  T(F): 98.1 (09 Nov 2018 15:30), Max: 98.5 (08 Nov 2018 20:02)  HR: 82 (09 Nov 2018 17:45) (65 - 94)  BP: 122/60 (09 Nov 2018 17:45) (110/64 - 150/81)  BP(mean): --  RR: 19 (09 Nov 2018 15:30) (16 - 19)  SpO2: 96% (09 Nov 2018 15:30) (96% - 98%)    GEN - sleeping on his rt side, obese, pleasant. appears slightly anxious  HEENT - NCAT, EOMI, CA, no JVD/bruit.  RESP - CTA BL, no wheeze/stridor/rhonchi/crackles. not on supplemental O2.  CARDIO - NS1S2, RRR. No murmurs/rubs/gallops.  ABD - Soft/Non tender/Non distended, obese abd. Normal BS x4 quadrants.   Ext - Lt LE - chronic lymphedema stocking in place. Rt LE - non edematous, non tender, warm, no signs of venous/arterial stasis ulcers  MSK - full ROM of BL upper and lower extremities without pain or restriction. BL 5/5 strength on upper and lower extremities.   Skin: grossly intact      -check orthostatics  -ivf   -check labs   -cancel dc home   -monitor clinically     time spent 40 minutes

## 2018-11-09 NOTE — DISCHARGE NOTE ADULT - PLAN OF CARE
Recovered and stable You were noted to have chest pain either on arrival or during your hospitalization. Tests to evaluate your heart including blood tests called troponins, EKGs and cardiac catheterization were performed and fortunately you do NOT have signs of heart attack based on these studies. If you have however been instructed to follow up with a Cardiologist for further work up or continued management, it is extremely important that you do so in order to lower your risk of having a heart attack in the future. If you have been prescribed medications for heart health, it is very important that you ALWAYS take them and do not stop doing so unless instructed by a healthcare provider. Resolved You were noticed to have blood work with some abnormalities. At this time, you are safe to leave the hospital, we do not suspect that anything immediately will come of these findings, but it is something that should be followed to see if it is getting better, staying the same, or getting worse. Be sure to discuss this at your follow up visit with your Primary Care Doctor to have these tests repeated and to see what work up, if any, is necessary to continue managing it. Stable You have hypothyroidism. For this condition, it is important to continue taking medication as prescribed. If your dose of thyroid replacement medication has been changed, be sure to have your Primary Care Doctor or Endocronologist repeat blood work to check your Thyroid function in 6 weeks to make sure that your current dose does not need to be changed again to reach the best possible level for you. Stopped smoking It is very important for your health that you stop smoking! There are many different ways to do so, nicotine patches, gum, and much more! Please discuss your various options with your Primary care doctor. There are also many online resources and hotlines that you can call. Feel free to request more information. If you experience numbness in your perianal region, sudden incontinence of stool or urine, urinary retention, bilateral shooting pain from your spine to he back of you lower extremities, please call 9-1-1, go the emergency or call your doctor immediately  If you are unable to carry out daily activities, eating, bathing, dressing, using the bathroom because of pain due to your back pain, please call your doctor or go the emergency room immediately  Please take your medication as prescribed. Please follow up with [facility] regularly. You were noted to have troubles with constipation. This may be related to your recent illness or the fact that you were in the hospital and eating things you may not always eat. However, if you do have issues with constipation, you may take over the counter stool softeners such as colace daily. In addition, from time to time you may add laxative medications such as Miralax and later senna. Over the counter enemas can also be used as needed. Be sure to drink lots of water daily, at least 8 glasses, unless you have been advised to be on a fluid restricted diet. If you note that you are relying on laxatives too often, be sure to discuss this with your Primary Care Doctor or see a Gastroenterologist to avoid causing your body to become dependent on laxative medications. BMI < 25 Follow a healthy calorie restricted diet. Try to participate in physical activity regularly, at least 150min per week. You may benefit from following up with a nutritionist. Be sure to follow up with your Primary doctor regularly for weight checks to track your progress and to discuss other options for weight loss. obesity 30-34.9 follow up with Electrophysiology cardiology Dr. Arrieta as out patient

## 2018-11-09 NOTE — DISCHARGE NOTE ADULT - ADDITIONAL INSTRUCTIONS
- Bruising at the groin, sometimes extending down the leg, and/or a small lump under the skin at the groin access site is normal and will resolve within 2 – 3 weeks.   - Occasional skipped beats or palpitations that last for a few beats are common and generally resolve within 1-2 months.   - You may walk and take stairs at a regular pace.   - Do not perform any exercise more strenuous than walking for 1 week.   - Do not strain or lift heavy objects for 1 week.  - You may shower the day after the procedure.  - Do not soak in water (such as tub baths, hot tubs, swimming, etc.) for 1 week.   - You may resume all other activities the day after the procedure.  Call your doctor if:   - you notice bleeding, redness, drainage, swelling, increased tenderness or a hot sensation around the catheter insertion site.   - your temperature is greater than 100 degrees F for more than 24 hours.  - your rapid heart rhythm returns.  - you have any questions or concerns regarding the procedure.  If significant bleeding and/or a large lump (the size of a golf ball or bigger) occurs:  - Lie flat and apply continuous direct pressure just above the puncture site for at least 10 minutes  - If the issue resolves, notify your physician immediately.    - If the bleeding cannot be controlled, please seek immediate medical attention.  If you experience increased difficulty breathing or chest pain, or if you faint or have dizzy spells, please seek immediate medical attention. - Bruising at the groin, sometimes extending down the leg, and/or a small lump under the skin at the groin access site is normal and will resolve within 2 – 3 weeks.   - Occasional skipped beats or palpitations that last for a few beats are common and generally resolve within 1-2 months.   - You may walk and take stairs at a regular pace.   - Do not perform any exercise more strenuous than walking for 1 week.   - Do not strain or lift heavy objects for 1 week.  - You may shower the day after the procedure.  - Do not soak in water (such as tub baths, hot tubs, swimming, etc.) for 1 week.   - You may resume all other activities the day after the procedure.  Call your doctor if:   - you notice bleeding, redness, drainage, swelling, increased tenderness or a hot sensation around the catheter insertion site.   - your temperature is greater than 100 degrees F for more than 24 hours. - your rapid heart rhythm returns. - you have any questions or concerns regarding the procedure.  If significant bleeding and/or a large lump (the size of a golf ball or bigger) occurs:  - Lie flat and apply continuous direct pressure just above the puncture site for at least 10 minutes - If the issue resolves, notify your physician immediately.  - If the bleeding cannot be controlled, please seek immediate medical attention.  If you experience increased difficulty breathing or chest pain, or if you faint or have dizzy spells, please seek immediate medical attention.    -Follow up with Cardiologist Dr. Brian Arrieta within 1 week of discharge  -Follow up with Primary Care Doctor Dr. Adonis Ivory within 1 week of discharge

## 2018-11-09 NOTE — DISCHARGE NOTE ADULT - PATIENT PORTAL LINK FT
You can access the [facility] Patient Portal, offered by [facility], by registering with the following website: [email or web address]

## 2018-11-09 NOTE — PROGRESS NOTE ADULT - PROBLEM SELECTOR PLAN 1
Post procedure orders and observations   Mynx closure device may sit up in 1 hour if hemostasis maintained   Discontinue heparin drip   Continue medical therapy Beta blockers/ statin/ ASA therapy   Outpatient follow up with Cardiologist Post procedure orders and observations   Groin precautions   Mynx closure device may sit up in 1 hour if hemostasis maintained   Discontinue heparin drip   Continue medical therapy Beta blockers/ statin/ ASA therapy   Outpatient follow up with Cardiologist

## 2018-11-09 NOTE — DISCHARGE NOTE ADULT - MEDICATION SUMMARY - MEDICATIONS TO TAKE
I will START or STAY ON the medications listed below when I get home from the hospital:    fentanyl topical  -- Apply on skin to affected area every 72 hours  -- RUQ abd  -- Indication: For Back Pain    methadone 10 mg oral tablet  -- 5 tab(s) by mouth once a day  -- Indication: For Back Pain    ALPRAZolam 1 mg oral tablet  -- 1 tab(s) by mouth 3 times a day  -- Indication: For Anxiety    Carafate 1 g oral tablet  -- 1 gram(s) by mouth , As Needed - for constipation  -- Indication: For Constipation    nicotine 14 mg/24 hr transdermal film, extended release  --  by transdermal patch   -- Indication: For Smoking cessation    levothyroxine 200 mcg (0.2 mg) oral tablet  -- 1 tab(s) by mouth once a day  -- Indication: For Hypothyroidism, unspecified type I will START or STAY ON the medications listed below when I get home from the hospital:    fentanyl topical  -- Apply on skin to affected area every 72 hours  -- RUQ abd  -- Indication: For Back Pain    methadone 10 mg oral tablet  -- 5 tab(s) by mouth once a day  -- Indication: For Back Pain    Aspirin Enteric Coated 81 mg oral delayed release tablet  -- 1 tab(s) by mouth once a day   -- Swallow whole.  Do not crush.  Take with food or milk.    -- Indication: For Coronary artery disease    atorvastatin 40 mg oral tablet  -- 1 tab(s) by mouth once a day (at bedtime)  -- Indication: For Chest pain due to coronary artery disease    ALPRAZolam 1 mg oral tablet  -- 1 tab(s) by mouth 3 times a day  -- Indication: For Anxiety    metoprolol tartrate 25 mg oral tablet  -- 1 tab(s) by mouth 2 times a day  -- Indication: For Chest pain due to coronary artery disease    Carafate 1 g oral tablet  -- 1 gram(s) by mouth , As Needed - for constipation  -- Indication: For Constipation    nicotine 14 mg/24 hr transdermal film, extended release  --  by transdermal patch   -- Indication: For Smoking cessation    levothyroxine 200 mcg (0.2 mg) oral tablet  -- 1 tab(s) by mouth once a day  -- Indication: For Hypothyroidism, unspecified type
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
## 2018-11-10 VITALS
RESPIRATION RATE: 20 BRPM | OXYGEN SATURATION: 98 % | HEART RATE: 75 BPM | DIASTOLIC BLOOD PRESSURE: 74 MMHG | SYSTOLIC BLOOD PRESSURE: 130 MMHG

## 2018-11-10 LAB
HCT VFR BLD CALC: 41.3 % — LOW (ref 42–52)
HGB BLD-MCNC: 12.8 G/DL — LOW (ref 14–18)
MCHC RBC-ENTMCNC: 25.2 PG — LOW (ref 27–31)
MCHC RBC-ENTMCNC: 31 G/DL — LOW (ref 32–36)
MCV RBC AUTO: 81.5 FL — SIGNIFICANT CHANGE UP (ref 80–94)
PLATELET # BLD AUTO: 246 K/UL — SIGNIFICANT CHANGE UP (ref 150–400)
RBC # BLD: 5.07 M/UL — SIGNIFICANT CHANGE UP (ref 4.6–6.2)
RBC # FLD: 16.8 % — HIGH (ref 11–15.6)
WBC # BLD: 9.7 K/UL — SIGNIFICANT CHANGE UP (ref 4.8–10.8)
WBC # FLD AUTO: 9.7 K/UL — SIGNIFICANT CHANGE UP (ref 4.8–10.8)

## 2018-11-10 PROCEDURE — 99239 HOSP IP/OBS DSCHRG MGMT >30: CPT

## 2018-11-10 RX ADMIN — FENTANYL CITRATE 1 PATCH: 50 INJECTION INTRAVENOUS at 08:22

## 2018-11-10 RX ADMIN — Medication 0.5 MILLIGRAM(S): at 08:25

## 2018-11-10 RX ADMIN — ONDANSETRON 4 MILLIGRAM(S): 8 TABLET, FILM COATED ORAL at 05:12

## 2018-11-10 RX ADMIN — Medication 1 PATCH: at 11:04

## 2018-11-10 RX ADMIN — Medication 1 PATCH: at 09:29

## 2018-11-10 RX ADMIN — Medication 25 MILLIGRAM(S): at 05:09

## 2018-11-10 RX ADMIN — METHADONE HYDROCHLORIDE 10 MILLIGRAM(S): 40 TABLET ORAL at 00:40

## 2018-11-10 RX ADMIN — METHADONE HYDROCHLORIDE 10 MILLIGRAM(S): 40 TABLET ORAL at 11:03

## 2018-11-10 RX ADMIN — METHADONE HYDROCHLORIDE 10 MILLIGRAM(S): 40 TABLET ORAL at 05:09

## 2018-11-10 RX ADMIN — Medication 200 MICROGRAM(S): at 05:09

## 2018-11-10 RX ADMIN — Medication 1 PATCH: at 11:03

## 2018-11-10 RX ADMIN — Medication 81 MILLIGRAM(S): at 11:03

## 2018-11-10 NOTE — PROGRESS NOTE ADULT - SUBJECTIVE AND OBJECTIVE BOX
OVERNIGHT EVENTS:  Patient seen and examined at bedside. He reports he is still feeling a little nauseous but did not vomit. He also complains of mild headache, 4/10, which is on/off but relates most of his symptoms to his anxiety and worrying about things. He is currently worried about being home alone and trying to get his friend to pick him up from the hospital.     ROS: Denies any fevers, chills, CP, SOB, D/C, or dysuria.    Vitals  T(C): 36.8 (11-10-18 @ 05:06), Max: 36.8 (11-09-18 @ 20:45)  HR: 75 (11-10-18 @ 08:28) (75 - 87)  BP: 130/74 (11-10-18 @ 08:28) (122/60 - 133/75)  RR: 20 (11-10-18 @ 08:28) (19 - 20)  SpO2: 98% (11-10-18 @ 08:28) (96% - 98%)    Physical Exam  GEN - sleeping on his rt side, obese, pleasant. appears slightly anxious  HEENT - NCAT, EOMI, CA, no JVD/bruit.  RESP - CTA BL, no wheeze/stridor/rhonchi/crackles. not on supplemental O2.  CARDIO - NS1S2, RRR. No murmurs/rubs/gallops.  ABD - Soft/Non tender/Non distended, obese abd. Normal BS x4 quadrants.   Ext - Lt LE - chronic lymphedema stocking in place. Rt LE - non edematous, non tender, warm, no signs of venous/arterial stasis ulcers  MSK - full ROM of BL upper and lower extremities without pain or restriction. BL 5/5 strength on upper and lower extremities.   Skin: grossly intact    Labs                        12.8   9.7   )-----------( 246      ( 10 Nov 2018 05:49 )             41.3       MEDICATIONS  (STANDING):  aspirin  chewable 81 milliGRAM(s) Oral daily  atorvastatin 40 milliGRAM(s) Oral at bedtime  fentaNYL   Patch  75 MICROgram(s)/Hr 1 Patch Transdermal every 72 hours  levothyroxine 200 MICROGram(s) Oral daily  methadone    Tablet 10 milliGRAM(s) Oral five times a day  metoprolol tartrate 25 milliGRAM(s) Oral two times a day  nicotine -  14 mG/24Hr(s) Patch 1 patch Transdermal daily  sodium chloride 0.9%. 1000 milliLiter(s) (75 mL/Hr) IV Continuous <Continuous>    MEDICATIONS  (PRN):  ALPRAZolam 0.5 milliGRAM(s) Oral three times a day PRN anxiety  ondansetron Injectable 4 milliGRAM(s) IV Push every 6 hours PRN Nausea and/or Vomiting  sucralfate 1 Gram(s) Oral two times a day PRN constipation

## 2018-11-10 NOTE — PROGRESS NOTE ADULT - REASON FOR ADMISSION
chest pain/SVT

## 2018-11-10 NOTE — PROGRESS NOTE ADULT - SUBJECTIVE AND OBJECTIVE BOX
68y old  Male who presents with a chief complaint of chest pain/SVT which broke with adenosine        MEDS  metoprolol tartrate 25 milliGRAM(s) Oral two times a day  Asa 81MG QD  ALPRAZolam 0.5 milliGRAM(s) Oral three times a day PRN  metoprolol tartrate 25 milliGRAM(s) Oral two times a dayfentaNYL   Patch  75 MICROgram(s)/Hr 1 Patch Transdermal every 72 hours  levothyroxine 200 MICROGram(s) Oral daily  methadone    Tablet 10 milliGRAM(s) Oral five times a day  metoprolol tartrate 25 milliGRAM(s) Oral two times a day  nicotine -  14 mG/24Hr(s) Patch 1 patch Transdermal daily  ondansetron Injectable 4 milliGRAM(s) IV Push every 6 hours PRN  sucralfate 1 Gram(s) Oral two times a day PRN        11-09    136  |  99  |  15.0  ----------------------------<  79  3.9   |  27.0  |  0.82    Ca    9.6      09 Nov 2018 18:41  Phos  3.3     11-09  Mg     2.3     11-09    TPro  7.2  /  Alb  3.9  /  TBili  0.3<L>  /  DBili  x   /  AST  12  /  ALT  9   /  AlkPhos  72  11-09                          12.8   9.7   )-----------( 246      ( 10 Nov 2018 05:49 )             41.3     LIVER FUNCTIONS - ( 09 Nov 2018 18:41 )  Alb: 3.9 g/dL / Pro: 7.2 g/dL / ALK PHOS: 72 U/L / ALT: 9 U/L / AST: 12 U/L / GGT: x           1. Left ventricular ejection fraction, by visual estimation, is >75%.   2. Hyperdynamic global left ventricular systolic function.   3. Spectral Doppler shows impaired relaxation pattern of left   ventricular myocardial filling (Grade I diastolic dysfunction).   4. There is moderate evidence of left ventricular hypertrophy.   5. There is no evidence of pericardial effusion.   6. Trace tricuspid regurgitation.   7. Pulmonic valve regurgitation.   8. Aortic Root measuring 3.8 cm.   9. Aortic root measured at Sinus of Valsalva is dilated.    CATh  VENTRICLES: EF 55% w/ no sig MR.  CORONARY VESSELS: R dominant.  Large RCA w/ minimal disease.  Large LAD w/ mild distal disease.  Moderate sized LCx w/ mild mid vessel disease.  AORTA: Mildly dilated asc aorta w/ trace AI.  COMPLICATIONS: No complications occurred during the cath lab visit.  DIAGNOSTIC IMPRESSIONS: Nonobstructive CAD.  Preserved EF w/ min MR.  Mildly dilated asc aorta w/ trace AI.  SVT.    T(C): 36.8 (11-10-18 @ 05:06), Max: 36.8 (11-09-18 @ 20:45)  HR: 75 (11-10-18 @ 08:28) (67 - 87)  BP: 130/74 (11-10-18 @ 08:28) (116/60 - 133/75)  RR: 20 (11-10-18 @ 08:28) (17 - 20)  SpO2: 98% (11-10-18 @ 08:28) (96% - 98%)      PE  In nad  Chest  Clear lung fields  Heart s1&s2 regular  Abd  soft active bowel sounds  EXT  No c/c/e  CATH SITE  right radial site without hematoma or ecchymosis  Neuro  Alert oriented Non focal exam    A/P    SVT Maintaining NSR on montior  CAth non obstructive dx c/w asa, metoprolol and lipitor  Active tobacco user ->smoking cessation encourage  Chronic pain methadone Duragesic  as ordered by pain management
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
## 2018-11-27 PROCEDURE — 93458 L HRT ARTERY/VENTRICLE ANGIO: CPT

## 2018-11-27 PROCEDURE — 85027 COMPLETE CBC AUTOMATED: CPT

## 2018-11-27 PROCEDURE — 83036 HEMOGLOBIN GLYCOSYLATED A1C: CPT

## 2018-11-27 PROCEDURE — 93306 TTE W/DOPPLER COMPLETE: CPT

## 2018-11-27 PROCEDURE — 93005 ELECTROCARDIOGRAM TRACING: CPT

## 2018-11-27 PROCEDURE — 84100 ASSAY OF PHOSPHORUS: CPT

## 2018-11-27 PROCEDURE — 96375 TX/PRO/DX INJ NEW DRUG ADDON: CPT

## 2018-11-27 PROCEDURE — 99285 EMERGENCY DEPT VISIT HI MDM: CPT | Mod: 25

## 2018-11-27 PROCEDURE — 93567 NJX CAR CTH SPRVLV AORTGRPHY: CPT

## 2018-11-27 PROCEDURE — 83735 ASSAY OF MAGNESIUM: CPT

## 2018-11-27 PROCEDURE — 80053 COMPREHEN METABOLIC PANEL: CPT

## 2018-11-27 PROCEDURE — 85610 PROTHROMBIN TIME: CPT

## 2018-11-27 PROCEDURE — 36415 COLL VENOUS BLD VENIPUNCTURE: CPT

## 2018-11-27 PROCEDURE — C1760: CPT

## 2018-11-27 PROCEDURE — 85730 THROMBOPLASTIN TIME PARTIAL: CPT

## 2018-11-27 PROCEDURE — C1894: CPT

## 2018-11-27 PROCEDURE — 99152 MOD SED SAME PHYS/QHP 5/>YRS: CPT

## 2018-11-27 PROCEDURE — 84443 ASSAY THYROID STIM HORMONE: CPT

## 2018-11-27 PROCEDURE — 96374 THER/PROPH/DIAG INJ IV PUSH: CPT

## 2018-11-27 PROCEDURE — 82550 ASSAY OF CK (CPK): CPT

## 2018-11-27 PROCEDURE — 80061 LIPID PANEL: CPT

## 2018-11-27 PROCEDURE — C1887: CPT

## 2018-11-27 PROCEDURE — C1769: CPT

## 2018-11-27 PROCEDURE — 71045 X-RAY EXAM CHEST 1 VIEW: CPT

## 2018-11-27 PROCEDURE — 84484 ASSAY OF TROPONIN QUANT: CPT

## 2019-03-04 NOTE — DISCHARGE NOTE ADULT - OTHER SIGNIFICANT FINDINGS
CBC Full  -  ( 09 Nov 2018 05:53 )  WBC Count : 7.8 K/uL  Hemoglobin : 12.6 g/dL  Hematocrit : 40.2 %  Platelet Count - Automated : 229 K/uL  Mean Cell Volume : 82.5 fl  Mean Cell Hemoglobin : 25.9 pg  Mean Cell Hemoglobin Concentration : 31.3 g/dL    11-08    137  |  100  |  17.0  ----------------------------<  109  4.8   |  27.0  |  0.89    Ca    9.4      08 Nov 2018 02:18  Phos  3.0     11-08  Mg     2.2     11-08    TPro  6.5<L>  /  Alb  3.5  /  TBili  0.3<L>  /  DBili  x   /  AST  10  /  ALT  7   /  AlkPhos  72  11-08    CARDIAC MARKERS ( 08 Nov 2018 08:10 )  x     / 0.05 ng/mL / x     / x     / x      CARDIAC MARKERS ( 08 Nov 2018 02:19 )  x     / 0.08 ng/mL / x     / x     / x      CARDIAC MARKERS ( 07 Nov 2018 19:10 )  x     / <0.01 ng/mL / 28 U/L / x     / x         12 Lead ECG (11.08.18 @ 03:38) >  Ventricular Rate 73 BPM  Atrial Rate 73 BPM  P-R Interval 206 ms  QRS Duration 104 ms  Q-T Interval 398 ms  QTC Calculation(Bezet) 438 ms  P Axis 35 degrees  R Axis -77 degrees  T Axis 58 degrees  Diagnosis Line Normal sinus rhythm  Left anterior fascicular block  Septal infarct , age undetermined  Abnormal ECG      < from: Cardiac Cath Lab - Adult (11.09.18 @ 08:04) >  DIAGNOSTIC IMPRESSIONS: Nonobstructive CAD.  Preserved EF w/ min MR.  Mildly dilated asc aorta w/ trace AI.  SVT.  DIAGNOSTIC RECOMMENDATIONS: Medical therapy/betablockers.  Outpt HM/ followup in 2-4 weeks .  INTERVENTIONAL IMPRESSIONS: Nonobstructive CAD.  Preserved EF w/ min MR.  Mildly dilated asc aorta w/ trace AI.  SVT.  INTERVENTIONAL RECOMMENDATIONS: Medical therapy/betablockers.  Outpt HM/ followup in 2-4 weeks . Yes

## 2019-08-23 NOTE — ED ADULT TRIAGE NOTE - CHIEF COMPLAINT QUOTE
pt BIBA from home for reports of SOB and chest pain, chronic lymph edema to left lower extremity s.p injury 20 years ago. pt AOX3, rec'd 324mg ASA, 20g right AC from EMS.
23-Aug-2019 20:55

## 2020-07-14 PROBLEM — J90 PLEURAL EFFUSION, NOT ELSEWHERE CLASSIFIED: Chronic | Status: ACTIVE | Noted: 2018-11-08

## 2020-07-14 PROBLEM — E03.9 HYPOTHYROIDISM, UNSPECIFIED: Chronic | Status: ACTIVE | Noted: 2018-11-08

## 2020-07-14 PROBLEM — F17.210 NICOTINE DEPENDENCE, CIGARETTES, UNCOMPLICATED: Chronic | Status: ACTIVE | Noted: 2018-11-08

## 2020-07-22 ENCOUNTER — APPOINTMENT (OUTPATIENT)
Dept: PULMONOLOGY | Facility: CLINIC | Age: 70
End: 2020-07-22
Payer: MEDICARE

## 2020-07-22 VITALS
RESPIRATION RATE: 16 BRPM | HEIGHT: 68 IN | OXYGEN SATURATION: 96 % | WEIGHT: 230 LBS | DIASTOLIC BLOOD PRESSURE: 72 MMHG | BODY MASS INDEX: 34.86 KG/M2 | HEART RATE: 89 BPM | SYSTOLIC BLOOD PRESSURE: 128 MMHG

## 2020-07-22 PROCEDURE — 99204 OFFICE O/P NEW MOD 45 MIN: CPT

## 2020-07-22 RX ORDER — OXYCODONE AND ACETAMINOPHEN 5; 325 MG/1; MG/1
5-325 TABLET ORAL
Qty: 30 | Refills: 0 | Status: DISCONTINUED | COMMUNITY
Start: 2017-02-13 | End: 2020-07-22

## 2020-07-22 NOTE — HISTORY OF PRESENT ILLNESS
[TextBox_4] : The patient is a 70-year-old gentleman referred by Dr. Ivory for evaluation of shortness of breath\par \par He is also in the process of a cardiac workup by Dr. 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 Menard\par The effusion did not return and he last saw Dr. Menard in 2017\par \par The patient benjie had peripheral lymphedema especially of the left lower extremity and has been treated locally with compression\par \par He has had evaluation by cardiology in the past and is currently referred for echocardiogram and stress testing by Dr. Richardson\par \par He walks with a cane. He does not appear to partake in any activity that is aerobic\par \par In the process of his recent evaluation he was sent for a CTA which was reviewed although the films could not be accessed\par No pulmonary emboli were noted, the patient had chronic thickening of the right pleura which was unchanged compared to earlier films. There was some bronchial wall thickening in the right mid lung and right lower lung that appeared to be stable from previous films\par There were no pulmonary masses noted\par \par \par

## 2020-07-22 NOTE — PHYSICAL EXAM
[No Acute Distress] : no acute distress [Normal Oropharynx] : normal oropharynx [Normal Appearance] : normal appearance [No Neck Mass] : no neck mass [Normal Rate/Rhythm] : normal rate/rhythm [Normal S1, S2] : normal s1, s2 [No Murmurs] : no murmurs [No Resp Distress] : no resp distress [Clear to Auscultation Bilaterally] : clear to auscultation bilaterally [No Abnormalities] : no abnormalities [Benign] : benign [Normal Gait] : normal gait [No Cyanosis] : no cyanosis [No Clubbing] : no clubbing [FROM] : FROM [No Edema] : no edema [Oriented x3] : oriented x3 [No Focal Deficits] : no focal deficits [Normal Color/ Pigmentation] : normal color/ pigmentation [Normal Affect] : normal affect

## 2020-07-22 NOTE — ASSESSMENT
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\par This appears to be stable although there is evidence of pleural thickening on his CT\par The cause of the chylous effusion was never known that he has evidence of lymphatic abnormality elsewhere\par \par The patient certainly has been a long-term smoker and although he has never been treated for COPD this is certainly a possibility\par CT findings are at least consistent with someone who has had a long smoking history with bronchial thickening etc.\par I will arrange for pulmonary function testing to evaluate the patient again prior to adding any inhalers\par \par CTA had been obtained and there is no evidence of thromboembolic disease at this time\par The patient is currently in the process of cardiac workup as well and this might be another contributing factor to his shortness of breath\par \par I have asked the patient to return here for evaluation after his pulmonary functions are obtained

## 2020-07-22 NOTE — CONSULT LETTER
[Dear  ___] : Dear  [unfilled], [FreeTextEntry1] : I had the pleasure of evaluating your patient, BLAS SUAZO , in the office today.  Please review my consultation and evaluation report that follows below.  Please do not hesitate to call me if further information is necessary or if you wish to discuss ongoing care or diagnostic work-up.   \par I very much appreciate your referral and it is a privilege to be able to provide care for your patient.\par \par Sincerely,\par  \par Blaine Giron MD, MHCM, FACP\par Pulmonary Medicine\par  of Medicine\par [employer] at [facility]\par \par [email or web address]\par Multi-Specialties at [place]\par \par  [DrDami  ___] : Dr. MALDONADO

## 2020-08-03 ENCOUNTER — APPOINTMENT (OUTPATIENT)
Dept: PULMONOLOGY | Facility: CLINIC | Age: 70
End: 2020-08-03

## 2020-08-07 ENCOUNTER — APPOINTMENT (OUTPATIENT)
Dept: PULMONOLOGY | Facility: CLINIC | Age: 70
End: 2020-08-07

## 2020-08-13 ENCOUNTER — APPOINTMENT (OUTPATIENT)
Dept: PULMONOLOGY | Facility: CLINIC | Age: 70
End: 2020-08-13
Payer: MEDICARE

## 2020-08-13 PROCEDURE — 99211 OFF/OP EST MAY X REQ PHY/QHP: CPT

## 2020-08-14 LAB — SARS-COV-2 N GENE NPH QL NAA+PROBE: NOT DETECTED

## 2020-08-17 ENCOUNTER — APPOINTMENT (OUTPATIENT)
Dept: PULMONOLOGY | Facility: CLINIC | Age: 70
End: 2020-08-17
Payer: MEDICARE

## 2020-08-17 DIAGNOSIS — Z93.8 OTHER ARTIFICIAL OPENING STATUS: ICD-10-CM

## 2020-08-17 PROCEDURE — 94010 BREATHING CAPACITY TEST: CPT

## 2020-08-17 PROCEDURE — 94729 DIFFUSING CAPACITY: CPT

## 2020-08-17 PROCEDURE — 94727 GAS DIL/WSHOT DETER LNG VOL: CPT

## 2020-08-17 PROCEDURE — 85018 HEMOGLOBIN: CPT | Mod: QW

## 2020-09-11 ENCOUNTER — APPOINTMENT (OUTPATIENT)
Dept: PULMONOLOGY | Facility: CLINIC | Age: 70
End: 2020-09-11
Payer: MEDICARE

## 2020-09-11 VITALS
OXYGEN SATURATION: 96 % | DIASTOLIC BLOOD PRESSURE: 75 MMHG | WEIGHT: 240 LBS | BODY MASS INDEX: 34.36 KG/M2 | HEART RATE: 83 BPM | TEMPERATURE: 98.9 F | SYSTOLIC BLOOD PRESSURE: 138 MMHG | HEIGHT: 70 IN

## 2020-09-11 DIAGNOSIS — J94.0 CHYLOUS EFFUSION: ICD-10-CM

## 2020-09-11 PROCEDURE — 99214 OFFICE O/P EST MOD 30 MIN: CPT

## 2020-09-11 RX ORDER — ALBUTEROL SULFATE 90 UG/1
108 (90 BASE) AEROSOL, METERED RESPIRATORY (INHALATION)
Qty: 1 | Refills: 3 | Status: ACTIVE | COMMUNITY
Start: 2020-09-11 | End: 1900-01-01

## 2020-09-11 NOTE — ASSESSMENT
[FreeTextEntry1] : 71 yo male with SOB\par \par PFTs showed normal volumes,  but min decr in Midexpir flow\par \par Given albuterol PRN\par \par In process of cardiac q/u by Dr. 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

## 2020-09-11 NOTE — PHYSICAL EXAM
[No Acute Distress] : no acute distress [Normal Oropharynx] : normal oropharynx [Normal Appearance] : normal appearance [No Neck Mass] : no neck mass [Normal Rate/Rhythm] : normal rate/rhythm [Normal S1, S2] : normal s1, s2 [No Resp Distress] : no resp distress [No Murmurs] : no murmurs [Clear to Auscultation Bilaterally] : clear to auscultation bilaterally [No Abnormalities] : no abnormalities [Normal Gait] : normal gait [Benign] : benign [No Clubbing] : no clubbing [No Cyanosis] : no cyanosis [No Edema] : no edema [FROM] : FROM [Oriented x3] : oriented x3 [No Focal Deficits] : no focal deficits [Normal Color/ Pigmentation] : normal color/ pigmentation [Normal Affect] : normal affect

## 2020-09-30 ENCOUNTER — RX RENEWAL (OUTPATIENT)
Age: 70
End: 2020-09-30

## 2020-10-15 ENCOUNTER — EMERGENCY (EMERGENCY)
Facility: HOSPITAL | Age: 70
LOS: 1 days | Discharge: HOME | End: 2020-10-15
Attending: EMERGENCY MEDICINE
Payer: MEDICARE

## 2020-10-15 VITALS
OXYGEN SATURATION: 97 % | SYSTOLIC BLOOD PRESSURE: 161 MMHG | HEART RATE: 98 BPM | WEIGHT: 240.08 LBS | DIASTOLIC BLOOD PRESSURE: 97 MMHG | RESPIRATION RATE: 18 BRPM | HEIGHT: 69 IN | TEMPERATURE: 99 F

## 2020-10-15 DIAGNOSIS — Z98.890 OTHER SPECIFIED POSTPROCEDURAL STATES: Chronic | ICD-10-CM

## 2020-10-15 LAB
ALBUMIN SERPL ELPH-MCNC: 3.5 G/DL — SIGNIFICANT CHANGE UP (ref 3.3–5.2)
ALP SERPL-CCNC: 83 U/L — SIGNIFICANT CHANGE UP (ref 40–120)
ALT FLD-CCNC: 10 U/L — SIGNIFICANT CHANGE UP
ANION GAP SERPL CALC-SCNC: 10 MMOL/L — SIGNIFICANT CHANGE UP (ref 5–17)
AST SERPL-CCNC: 11 U/L — SIGNIFICANT CHANGE UP
BASOPHILS # BLD AUTO: 0.03 K/UL — SIGNIFICANT CHANGE UP (ref 0–0.2)
BASOPHILS NFR BLD AUTO: 0.4 % — SIGNIFICANT CHANGE UP (ref 0–2)
BILIRUB SERPL-MCNC: 0.2 MG/DL — LOW (ref 0.4–2)
BUN SERPL-MCNC: 16 MG/DL — SIGNIFICANT CHANGE UP (ref 8–20)
CALCIUM SERPL-MCNC: 9.2 MG/DL — SIGNIFICANT CHANGE UP (ref 8.6–10.2)
CHLORIDE SERPL-SCNC: 100 MMOL/L — SIGNIFICANT CHANGE UP (ref 98–107)
CO2 SERPL-SCNC: 24 MMOL/L — SIGNIFICANT CHANGE UP (ref 22–29)
CREAT SERPL-MCNC: 0.85 MG/DL — SIGNIFICANT CHANGE UP (ref 0.5–1.3)
EOSINOPHIL # BLD AUTO: 0.03 K/UL — SIGNIFICANT CHANGE UP (ref 0–0.5)
EOSINOPHIL NFR BLD AUTO: 0.4 % — SIGNIFICANT CHANGE UP (ref 0–6)
GLUCOSE SERPL-MCNC: 99 MG/DL — SIGNIFICANT CHANGE UP (ref 70–99)
HCT VFR BLD CALC: 41.6 % — SIGNIFICANT CHANGE UP (ref 39–50)
HGB BLD-MCNC: 13 G/DL — SIGNIFICANT CHANGE UP (ref 13–17)
IMM GRANULOCYTES NFR BLD AUTO: 0.6 % — SIGNIFICANT CHANGE UP (ref 0–1.5)
LYMPHOCYTES # BLD AUTO: 0.79 K/UL — LOW (ref 1–3.3)
LYMPHOCYTES # BLD AUTO: 10.1 % — LOW (ref 13–44)
MAGNESIUM SERPL-MCNC: 2.2 MG/DL — SIGNIFICANT CHANGE UP (ref 1.8–2.6)
MCHC RBC-ENTMCNC: 27.3 PG — SIGNIFICANT CHANGE UP (ref 27–34)
MCHC RBC-ENTMCNC: 31.3 GM/DL — LOW (ref 32–36)
MCV RBC AUTO: 87.2 FL — SIGNIFICANT CHANGE UP (ref 80–100)
MONOCYTES # BLD AUTO: 0.63 K/UL — SIGNIFICANT CHANGE UP (ref 0–0.9)
MONOCYTES NFR BLD AUTO: 8 % — SIGNIFICANT CHANGE UP (ref 2–14)
NEUTROPHILS # BLD AUTO: 6.32 K/UL — SIGNIFICANT CHANGE UP (ref 1.8–7.4)
NEUTROPHILS NFR BLD AUTO: 80.5 % — HIGH (ref 43–77)
NT-PROBNP SERPL-SCNC: 71 PG/ML — SIGNIFICANT CHANGE UP (ref 0–300)
PLATELET # BLD AUTO: 203 K/UL — SIGNIFICANT CHANGE UP (ref 150–400)
POTASSIUM SERPL-MCNC: 4.7 MMOL/L — SIGNIFICANT CHANGE UP (ref 3.5–5.3)
POTASSIUM SERPL-SCNC: 4.7 MMOL/L — SIGNIFICANT CHANGE UP (ref 3.5–5.3)
PROT SERPL-MCNC: 6.6 G/DL — SIGNIFICANT CHANGE UP (ref 6.6–8.7)
RBC # BLD: 4.77 M/UL — SIGNIFICANT CHANGE UP (ref 4.2–5.8)
RBC # FLD: 14.6 % — HIGH (ref 10.3–14.5)
SODIUM SERPL-SCNC: 134 MMOL/L — LOW (ref 135–145)
TROPONIN T SERPL-MCNC: <0.01 NG/ML — SIGNIFICANT CHANGE UP (ref 0–0.06)
WBC # BLD: 7.85 K/UL — SIGNIFICANT CHANGE UP (ref 3.8–10.5)
WBC # FLD AUTO: 7.85 K/UL — SIGNIFICANT CHANGE UP (ref 3.8–10.5)

## 2020-10-15 PROCEDURE — 93010 ELECTROCARDIOGRAM REPORT: CPT

## 2020-10-15 PROCEDURE — 99220: CPT

## 2020-10-15 PROCEDURE — 71045 X-RAY EXAM CHEST 1 VIEW: CPT | Mod: 26

## 2020-10-15 RX ORDER — NITROGLYCERIN 6.5 MG
1 CAPSULE, EXTENDED RELEASE ORAL ONCE
Refills: 0 | Status: COMPLETED | OUTPATIENT
Start: 2020-10-15 | End: 2020-10-15

## 2020-10-15 RX ORDER — ASPIRIN/CALCIUM CARB/MAGNESIUM 324 MG
325 TABLET ORAL ONCE
Refills: 0 | Status: COMPLETED | OUTPATIENT
Start: 2020-10-15 | End: 2020-10-15

## 2020-10-15 RX ORDER — FUROSEMIDE 40 MG
40 TABLET ORAL ONCE
Refills: 0 | Status: COMPLETED | OUTPATIENT
Start: 2020-10-15 | End: 2020-10-15

## 2020-10-15 RX ADMIN — Medication 40 MILLIGRAM(S): at 23:16

## 2020-10-15 RX ADMIN — Medication 325 MILLIGRAM(S): at 22:46

## 2020-10-15 RX ADMIN — Medication 1 INCH(S): at 22:46

## 2020-10-15 NOTE — ED PROVIDER NOTE - CLINICAL SUMMARY MEDICAL DECISION MAKING FREE TEXT BOX
69 y/o M with h/o COPD and SVT concerning new onset of CHF, will do labs, CXR and call cardiology for consult.

## 2020-10-15 NOTE — ED PROVIDER NOTE - CARDIAC, MLM
Normal rate, regular rhythm.  Heart sounds S1, S2.  No murmurs, rubs or gallops. left leg chronic venous stasis

## 2020-10-15 NOTE — ED ADULT TRIAGE NOTE - CHIEF COMPLAINT QUOTE
pt BIBA for increasing SOB with intermittent chest pain x 1 week. pt denies change in BLE edema. pt denies fever, chills, cough, congestion, recent sick contact or travel

## 2020-10-15 NOTE — ED PROVIDER NOTE - OBJECTIVE STATEMENT
72 y/o M with h/o COPD, chronic pian and irrigular heart beat p/w SOB, generalized CP and MAZARIEGOS  Denies CP, abdominal pain

## 2020-10-15 NOTE — ED ADULT NURSE REASSESSMENT NOTE - NS ED NURSE REASSESS COMMENT FT1
Assumed pt care @ 2300. Pt sitting upright in stretcher in no apparent signs of distress. Pt placed on cardiac monitor and , PIV site WNL. Pt A&Ox4 with no complaints of pain or discomfort, refer to flowsheet and chart, pt ID band in place, pt safety maintained, pt hemodynamically stable, updated on plan of care. Awaiting AM cardiology consult. Call light provided, fall safety reinforced. Will continue to monitor

## 2020-10-16 VITALS — SYSTOLIC BLOOD PRESSURE: 100 MMHG | DIASTOLIC BLOOD PRESSURE: 54 MMHG | HEART RATE: 80 BPM | TEMPERATURE: 98 F

## 2020-10-16 LAB
APPEARANCE UR: CLEAR — SIGNIFICANT CHANGE UP
BILIRUB UR-MCNC: NEGATIVE — SIGNIFICANT CHANGE UP
COLOR SPEC: YELLOW — SIGNIFICANT CHANGE UP
DIFF PNL FLD: NEGATIVE — SIGNIFICANT CHANGE UP
GLUCOSE UR QL: NEGATIVE MG/DL — SIGNIFICANT CHANGE UP
KETONES UR-MCNC: NEGATIVE — SIGNIFICANT CHANGE UP
LEUKOCYTE ESTERASE UR-ACNC: NEGATIVE — SIGNIFICANT CHANGE UP
NITRITE UR-MCNC: NEGATIVE — SIGNIFICANT CHANGE UP
PH UR: 6.5 — SIGNIFICANT CHANGE UP (ref 5–8)
PROT UR-MCNC: NEGATIVE MG/DL — SIGNIFICANT CHANGE UP
SP GR SPEC: 1.01 — SIGNIFICANT CHANGE UP (ref 1.01–1.02)
TROPONIN T SERPL-MCNC: <0.01 NG/ML — SIGNIFICANT CHANGE UP (ref 0–0.06)
TROPONIN T SERPL-MCNC: <0.01 NG/ML — SIGNIFICANT CHANGE UP (ref 0–0.06)
UROBILINOGEN FLD QL: NEGATIVE MG/DL — SIGNIFICANT CHANGE UP

## 2020-10-16 PROCEDURE — 93005 ELECTROCARDIOGRAM TRACING: CPT

## 2020-10-16 PROCEDURE — 81003 URINALYSIS AUTO W/O SCOPE: CPT

## 2020-10-16 PROCEDURE — 83735 ASSAY OF MAGNESIUM: CPT

## 2020-10-16 PROCEDURE — 93010 ELECTROCARDIOGRAM REPORT: CPT | Mod: 76

## 2020-10-16 PROCEDURE — G0378: CPT

## 2020-10-16 PROCEDURE — 99217: CPT

## 2020-10-16 PROCEDURE — 96374 THER/PROPH/DIAG INJ IV PUSH: CPT

## 2020-10-16 PROCEDURE — 99284 EMERGENCY DEPT VISIT MOD MDM: CPT | Mod: 25

## 2020-10-16 PROCEDURE — 84484 ASSAY OF TROPONIN QUANT: CPT

## 2020-10-16 PROCEDURE — 71045 X-RAY EXAM CHEST 1 VIEW: CPT

## 2020-10-16 PROCEDURE — 83880 ASSAY OF NATRIURETIC PEPTIDE: CPT

## 2020-10-16 PROCEDURE — 36415 COLL VENOUS BLD VENIPUNCTURE: CPT

## 2020-10-16 PROCEDURE — 80053 COMPREHEN METABOLIC PANEL: CPT

## 2020-10-16 PROCEDURE — 85025 COMPLETE CBC W/AUTO DIFF WBC: CPT

## 2020-10-16 RX ORDER — METOPROLOL TARTRATE 50 MG
25 TABLET ORAL
Refills: 0 | Status: DISCONTINUED | OUTPATIENT
Start: 2020-10-16 | End: 2020-10-20

## 2020-10-16 RX ORDER — LEVOTHYROXINE SODIUM 125 MCG
200 TABLET ORAL DAILY
Refills: 0 | Status: DISCONTINUED | OUTPATIENT
Start: 2020-10-16 | End: 2020-10-20

## 2020-10-16 RX ORDER — NICOTINE POLACRILEX 2 MG
1 GUM BUCCAL DAILY
Refills: 0 | Status: DISCONTINUED | OUTPATIENT
Start: 2020-10-16 | End: 2020-10-20

## 2020-10-16 RX ORDER — TAMSULOSIN HYDROCHLORIDE 0.4 MG/1
0.4 CAPSULE ORAL AT BEDTIME
Refills: 0 | Status: DISCONTINUED | OUTPATIENT
Start: 2020-10-16 | End: 2020-10-20

## 2020-10-16 RX ORDER — ALPRAZOLAM 0.25 MG
0.5 TABLET ORAL EVERY 6 HOURS
Refills: 0 | Status: DISCONTINUED | OUTPATIENT
Start: 2020-10-16 | End: 2020-10-16

## 2020-10-16 RX ORDER — SUCRALFATE 1 G
1 TABLET ORAL EVERY 6 HOURS
Refills: 0 | Status: DISCONTINUED | OUTPATIENT
Start: 2020-10-16 | End: 2020-10-20

## 2020-10-16 RX ORDER — METHADONE HYDROCHLORIDE 40 MG/1
10 TABLET ORAL
Refills: 0 | Status: COMPLETED | OUTPATIENT
Start: 2020-10-16 | End: 2020-10-23

## 2020-10-16 RX ORDER — OXYBUTYNIN CHLORIDE 5 MG
5 TABLET ORAL
Refills: 0 | Status: DISCONTINUED | OUTPATIENT
Start: 2020-10-16 | End: 2020-10-20

## 2020-10-16 RX ORDER — ATORVASTATIN CALCIUM 80 MG/1
40 TABLET, FILM COATED ORAL AT BEDTIME
Refills: 0 | Status: DISCONTINUED | OUTPATIENT
Start: 2020-10-16 | End: 2020-10-20

## 2020-10-16 RX ORDER — FUROSEMIDE 40 MG
1 TABLET ORAL
Qty: 30 | Refills: 0
Start: 2020-10-16 | End: 2020-11-14

## 2020-10-16 RX ORDER — PANTOPRAZOLE SODIUM 20 MG/1
40 TABLET, DELAYED RELEASE ORAL
Refills: 0 | Status: DISCONTINUED | OUTPATIENT
Start: 2020-10-16 | End: 2020-10-20

## 2020-10-16 RX ADMIN — Medication 200 MICROGRAM(S): at 05:27

## 2020-10-16 RX ADMIN — Medication 25 MILLIGRAM(S): at 05:26

## 2020-10-16 RX ADMIN — Medication 5 MILLIGRAM(S): at 05:27

## 2020-10-16 RX ADMIN — Medication 1 GRAM(S): at 05:26

## 2020-10-16 RX ADMIN — Medication 0.5 MILLIGRAM(S): at 02:52

## 2020-10-16 RX ADMIN — METHADONE HYDROCHLORIDE 10 MILLIGRAM(S): 40 TABLET ORAL at 02:51

## 2020-10-16 RX ADMIN — PANTOPRAZOLE SODIUM 40 MILLIGRAM(S): 20 TABLET, DELAYED RELEASE ORAL at 07:32

## 2020-10-16 NOTE — ED CDU PROVIDER INITIAL DAY NOTE - OBJECTIVE STATEMENT
69yo M pmhx chronic back pain 2/2 6 disc herniations, hypothyroidism, GERD, HLD, irregular heart beat presents to ED c/o MAZARIEGOS x 2 weeks. Pt noting worsening MAZARIEGOS for the past several weeks, has progressed to the point that he cannot walk down more than one aisle in the grocery store without becoming short of breath. Also noting mild chest discomfort, but states only when he is laboring to breath, otherwise denies chest pain. Pt states he had a cath 1.5 years ago which was normal- no stents. No record of cath in HIE. While in ED/Obs Troponin negative x 2, BNP wnl. Pt placed in obs for serial enzymes and [facility] consult in morning. ED attending Dr. Ward spoke with Dr. Callahan who will see pt in morning. Denies fever, palpitations, LE edema, n/v/d, diaphoresis, orthopnea, recent travel, sick contacts.   TTE from 11/9/18 LVEF >75%.   Cardiologist: [facility]

## 2020-10-16 NOTE — ED ADULT NURSE REASSESSMENT NOTE - NS ED NURSE REASSESS COMMENT FT1
Pt sleeping in stretcher in no apparent signs of distress. Pt remains on supplemental O2, PIV site WNL. Pt status unchanged, refer to flowsheet and chart, pt ID band in place, pt safety maintained, pt hemodynamically stable, updated on plan of care. Awaiting AM cardiology consult. Call light provided, fall safety reinforced. Will continue to monitor.

## 2020-10-16 NOTE — PROVIDER CONTACT NOTE (OTHER) - ASSESSMENT
CM MET W/ PT AT [facility].  ENCOURAGED [facility] FOR MED CHANGES A Pt already dc'd.  CM spoke to Pt via phone, and encouraged chha for changes in med r/t CHF. Pt declining, states he sees mult doctors frequently, and doesn't want anyone in his home d/t covid.

## 2020-10-16 NOTE — ED CDU PROVIDER SUBSEQUENT DAY NOTE - PHYSICAL EXAMINATION
Gen: WD/WN, NAD  Head: NCAT  Eyes: EOMI, PERRL  Neck:. Soft and supple. Trachea midline, No JVD  Cardiac: RRR +S1S2.   Resp: Speaking in full sentences. No evidence of respiratory distress. Grossly clear lungs b/l  Abd: +BS x 4. Soft, non-tender, non-distended. No guarding or rebound.  MSK: FROM extremities x 4, no bony ttp  Skin: Warm, dry, no rashes or lesions  Neuro: Awake, alert & oriented x 3. No focal deficits, ambulatory with steady gait

## 2020-10-16 NOTE — ED CDU PROVIDER DISPOSITION NOTE - ATTENDING CONTRIBUTION TO CARE
placed in observation for serial troponin and cardiology consultation.  Troponin negative.  seen by [facility] and daily lasix recommended.

## 2020-10-16 NOTE — ED CDU PROVIDER DISPOSITION NOTE - PATIENT PORTAL LINK FT
You can access the FollowMyHealth Patient Portal offered by [facility] by registering at the following website: [email or web address]. By joining [employer]’s FollowMyHealth portal, you will also be able to view your health information using other applications (apps) compatible with our system.

## 2020-10-16 NOTE — ED CDU PROVIDER DISPOSITION NOTE - CARE PROVIDER_API CALL
True Richardson  CARDIOVASCULAR DISEASE  [address] [place] [id number]  Phone: [phone]  Fax: [phone]  Follow Up Time:

## 2020-10-16 NOTE — ED CDU PROVIDER DISPOSITION NOTE - NS_CDULENGTHOFSTAY_ED_A_ED
10 Hour(s) 42 Minute(s) Aortic stenosis  moderate  Asthma    Atrial fibrillation    HLD (hyperlipidemia)    HTN (hypertension)    Morbid obesity    Spinal stenosis

## 2020-10-16 NOTE — ED CDU PROVIDER INITIAL DAY NOTE - PHYSICAL EXAMINATION
Gen: WD/WN, NAD  Head: NCAT  Eyes: EOMI, PERRL  Neck:. Soft and supple. Trachea midline, No JVD  Cardiac: RRR +S1S2.   Resp: Speaking in full sentences. No evidence of respiratory distress. Decreased breath sounds right lung base compared to left, otherwise CTA   Abd: +BS x 4. Soft, non-tender, non-distended. No guarding or rebound.  MSK: FROM extremities x 4, no bony ttp  Skin: Warm, dry, no rashes or lesions  Neuro: Awake, alert & oriented x 3. No focal deficits, ambulatory with steady gait

## 2020-10-16 NOTE — ED ADULT NURSE REASSESSMENT NOTE - NS ED NURSE REASSESS COMMENT FT1
Pt sleeping in stretcher in no apparent signs of distress. Pt remains on supplemental O2, PIV site WNL. Pt status unchanged, refer to flowsheet and chart, pt ID band in place, pt safety maintained, pt hemodynamically stable, updated on plan of care. Awaiting rehab placement. Call light provided, fall safety reinforced. Will continue to monitor Pt sleeping in stretcher in no apparent signs of distress. Pt remains on supplemental O2, PIV site WNL. Pt status unchanged, refer to flowsheet and chart, pt ID band in place, pt safety maintained, pt hemodynamically stable, updated on plan of care. Awaiting AM cardiology consult. Call light provided, fall safety reinforced. Will continue to monitor

## 2020-10-16 NOTE — ED CDU PROVIDER DISPOSITION NOTE - CLINICAL COURSE
69yo M pmhx chronic back pain 2/2 6 disc herniations, hypothyroidism, GERD, HLD, irregular heart beat presents to ED c/o MAZARIEGOS worsening over 2 weeks. has progressed to the point that he cannot walk down more than one aisle in the grocery store without becoming short of breath. Also noting mild chest discomfort, but states only when he is laboring to breath, otherwise denies chest pain. Pt states he had a cath 1.5 years ago which was normal- no stents. No record of cath in HIE.   Troponin negative x 2, BNP wnl. Pt placed in obs for serial enzymes and [facility] consult in morning.  Patient was able to ambulate without MAZARIEGOS after lasix and had significant increased urine output.  Chronic left leg lymphedema.  Dr Acharya saw patient this am, recommends lasix 40 mg daily and f/u with Dr Richardson 1 week.

## 2020-10-16 NOTE — ED CDU PROVIDER SUBSEQUENT DAY NOTE - HISTORY
Pt re-assessed, feeling well. Noting improvement in dyspnea, ambulated to bathroom without significant shortness of breath. Trop negative x 3. pending [facility] consult

## 2020-10-16 NOTE — ED CDU PROVIDER DISPOSITION NOTE - NSFOLLOWUPINSTRUCTIONS_ED_ALL_ED_FT
-Begin taking Lasix 40 mg daily, this is a water pill and will make you urinate more, take in the morning.  Eat a banana daily  - make an appointment with Dr Richardson for 1 week  - Return to the ED with any concerning symptoms

## 2020-10-16 NOTE — CONSULT NOTE ADULT - ASSESSMENT
70y Male with prior history of PSVT, non obstructive CAD, chronic lymphedema who presented with shortness of breath, MAZARIEGOS that has improved after iv diuretic therapy.    PSVT, HTN  - BP controlled on metoprolol    Dyslipidemia  - Continue statin    CAD  - On ASA  - No evidence of ACS at this time with recent work-up negative for ischemic heart disease    Shortness of breath  - Likely multifactorial including mild volume overload from HFpEF in the setting of salt/ dietary indiscretion  - Add lasix 40 mg daily to medical regimen on d/c    Lymphedema  - managed with compression stockings and diuretics    No further inpt cardiac work-up needed. Pls recall if any qns/concerns. Will arrange outpt FU post discharge in 1 week with Dr. Richardson

## 2020-10-16 NOTE — CONSULT NOTE ADULT - SUBJECTIVE AND OBJECTIVE BOX
[facility], THE HEART [facility]                              [address] [address]                                                 PHONE: [phone]                                                 FAX: [phone]  ------------------------------------------------------------------------------------------------    70y Male with past medical history as under presents to ED c/o MAZARIEGOS x 2 weeks. Pt noting worsening MAZARIEGOS for the past several weeks, has progressed to the point that he cannot walk down more than one aisle in the grocery store without becoming short of breath. Also noting mild chest discomfort, but states only when he is laboring to breath, otherwise denies chest pain.  He received iv lasix and reports he diuresed well and has significant improvement in his symptoms. He admits to not watching his salt intake and he tends to take out as well. He has gained about 30 lbs as per him.  At the time of evaluation, pt reports feeling better. He reports he has been walking to the bathroom with less shortness of breath,    PAST MEDICAL & SURGICAL HISTORY:  Pleural effusion on right    Hypothyroidism, unspecified type    Tobacco dependence due to cigarettes    Lymphedema of left lower extremity    DDD (degenerative disc disease), lumbar    S/P right knee surgery    S/P inguinal hernia repair    S/P rotator cuff repair    H/O Spinal surgery        Darvon (Swelling)  penicillin (Swelling)      Review of Systems:  Positive for shortness of breath, dyspnea on exertion, edema  Rest of the systems were reviewed and was negative.     Family history:   No pertinent family history in first degree relative of early CAD, sudden cardiac death or  congenital heart disease    Social History:  No smoking   No alcohol  No other drug use    Vital Signs Last 24 Hrs  T(C): 36.7 (16 Oct 2020 07:32), Max: 37.2 (15 Oct 2020 18:19)  T(F): 98 (16 Oct 2020 07:32), Max: 99 (15 Oct 2020 18:19)  HR: 80 (16 Oct 2020 07:32) (80 - 98)  BP: 100/54 (16 Oct 2020 07:32) (100/54 - 161/97)  BP(mean): --  RR: 18 (16 Oct 2020 05:23) (18 - 19)  SpO2: 98% (16 Oct 2020 05:23) (97% - 98%)    PHYSICAL EXAM:  Constitutional: Obese male in bed; alert and co-operative  HEENT:     Head: Normocephalic and atraumatic  Eyes: Conjunctiva normal, No scleral icterus  Neck: Supple, No JVD  Mouth/Throat: Mucous membranes are normal. Mucosa are not pale or dry.  Cardiovascular: regular S1, S2  Respiratory: Lungs clear to auscultation; no crepitations, no wheeze  Gastrointestinal:  Soft, Non-tender, + BS	  Musculoskeletal: Normal range of motion. + lymphedema   Skin: Warm and dry. No cyanosis . No diaphoresis.  Neurologic: Alert oriented to time place and person. Normal strength and no tremor.  Psychiatric: Normal mood and affect, Speech is normal and behavior is normal.        LABS:                        13.0   7.85  )-----------( 203      ( 15 Oct 2020 19:32 )             41.6     10-15    134<L>  |  100  |  16.0  ----------------------------<  99  4.7   |  24.0  |  0.85    Ca    9.2      15 Oct 2020 19:32  Mg     2.2     10-15    TPro  6.6  /  Alb  3.5  /  TBili  0.2<L>  /  DBili  x   /  AST  11  /  ALT  10  /  AlkPhos  83  10-15    CARDIAC MARKERS ( 16 Oct 2020 04:45 )  x     / <0.01 ng/mL / x     / x     / x      CARDIAC MARKERS ( 16 Oct 2020 00:13 )  x     / <0.01 ng/mL / x     / x     / x      CARDIAC MARKERS ( 15 Oct 2020 19:32 )  x     / <0.01 ng/mL / x     / x     / x              RADIOLOGY & ADDITIONAL STUDIES: (reviewed)  CXR was independently visualized/reviewed and demonstrated: Chronic right lateral pleural thickening and/or minimal   residual pleural fluid. Subsegmental atelectasis right lower lobe.    CARDIOLOGY TESTING:(reviewed)     ECG (Independent visualization): NSR with anterolateral ST changes unchanged from prior ECGs    ECHOCARDIOGRAM : 8/2020: mild LVOT gradient and LVH    STRESS TEST: 8/2020: Normal perfusion    CARDIAC CATHETERIZATION:  VENTRICLES: EF 55% w/ no sig MR.  CORONARY VESSELS: R dominant.  Large RCA w/ minimal disease.  Large LAD w/ mild distal disease.  Moderate sized LCx w/ mild mid vessel disease.  AORTA: Mildly dilated asc aorta w/ trace AI.  COMPLICATIONS: No complications occurred during the cath lab visit.  DIAGNOSTIC IMPRESSIONS: Nonobstructive CAD.  Preserved EF w/ min MR.  Mildly dilated asc aorta w/ trace AI.  SVT.  DIAGNOSTIC RECOMMENDATIONS: Medical therapy/betablockers.  Outpt HM/ followup in 2-4 weeks .  INTERVENTIONAL IMPRESSIONS: Nonobstructive CAD.  Preserved EF w/ min MR.  Mildly dilated asc aorta w/ trace AI.  SVT.  INTERVENTIONAL RECOMMENDATIONS: Medical therapy/betablockers.    MEDICATIONS:(reviewed)  Home Medications:  Home Medications:  ALPRAZolam 1 mg oral tablet: 1 tab(s) orally 3 times a day (08 Nov 2018 02:25)  Carafate 1 g oral tablet: 1 gram(s) orally , As Needed - for constipation (08 Nov 2018 02:25)  fentanyl topical: Apply topically to affected area every 72 hours (08 Nov 2018 02:25)  levothyroxine 200 mcg (0.2 mg) oral tablet: 1 tab(s) orally once a day (08 Nov 2018 02:25)  methadone 10 mg oral tablet: 5 tab(s) orally once a day (08 Nov 2018 02:25)  nicotine 14 mg/24 hr transdermal film, extended release:  transdermal  (09 Nov 2018 13:54)      MEDICATIONS  (STANDING):  atorvastatin 40 milliGRAM(s) Oral at bedtime  levothyroxine 200 MICROGram(s) Oral daily  methadone    Tablet 10 milliGRAM(s) Oral four times a day  metoprolol tartrate 25 milliGRAM(s) Oral two times a day  nicotine -   7 mG/24Hr(s) Patch 1 patch Transdermal daily  oxybutynin 5 milliGRAM(s) Oral two times a day  pantoprazole    Tablet 40 milliGRAM(s) Oral before breakfast  sucralfate suspension 1 Gram(s) Oral every 6 hours  tamsulosin 0.4 milliGRAM(s) Oral at bedtime    MEDICATIONS  (PRN):  ALPRAZolam 0.5 milliGRAM(s) Oral every 6 hours PRN anxiety

## 2020-10-16 NOTE — ED CDU PROVIDER INITIAL DAY NOTE - ATTENDING CONTRIBUTION TO CARE
I agree with the PA's note and was available for any issues/concerns. I was directly involved in patient care. My brief overall assessment is as follows:    70 year old male PMHx HTN c/o dyspnea for several days; initial work up demonstrates mild increased right pleural effusion from prior CXR; cardiology recommend additional work up

## 2020-10-16 NOTE — ED CDU PROVIDER SUBSEQUENT DAY NOTE - ATTENDING CONTRIBUTION TO CARE
see on mronign rounds.  reports marked improvement of symtpoms after lasix administration; was able to ambulate to bathroom without SOB.  Reports SOB on exertion for the past mth, becoming progressiveley worse over the past 2-3 weeks.  no assoc chest pain.  no pleuritic CP.  Also with chronic leg and back pain for years and has "gained a lot of weight" due to limited activity.  Recent cardiac MR and echo with [facility] in the past month: reportedly normal.  PE:  NARD, chest CTA, chronic left leg lymphedema with jobes stocking in place.  No pedal edema right leg.  Labs reviewed and unremarkable.  Awaiting cardiology consultation for recommendations

## 2020-12-08 NOTE — ED ADULT NURSE NOTE - DRUG PRE-SCREENING (DAST -1)
Josefina Gabriel    Mr. Lemuel Hunter was again informed of the nature of the problem, planned treatment, indications and alternatives.   We again reviewed the expected benefits of surgery, as well as all ilya Statement Selected

## 2020-12-09 ENCOUNTER — APPOINTMENT (OUTPATIENT)
Dept: PULMONOLOGY | Facility: CLINIC | Age: 70
End: 2020-12-09
Payer: MEDICARE

## 2020-12-09 VITALS
WEIGHT: 240 LBS | DIASTOLIC BLOOD PRESSURE: 64 MMHG | TEMPERATURE: 98.4 F | HEART RATE: 83 BPM | OXYGEN SATURATION: 97 % | HEIGHT: 61 IN | BODY MASS INDEX: 45.31 KG/M2 | SYSTOLIC BLOOD PRESSURE: 110 MMHG

## 2020-12-09 DIAGNOSIS — J90 PLEURAL EFFUSION, NOT ELSEWHERE CLASSIFIED: ICD-10-CM

## 2020-12-09 PROCEDURE — 99214 OFFICE O/P EST MOD 30 MIN: CPT

## 2020-12-09 NOTE — CONSULT LETTER
[Dear  ___] : Dear  [unfilled], [FreeTextEntry1] : I had the pleasure of evaluating your patient, BLAS SUAZO , in the office today.  Please review my consultation and evaluation report that follows below.  Please do not hesitate to call me if further information is necessary or if you wish to discuss ongoing care or diagnostic work-up.   \par I very much appreciate your referral and it is a privilege to be able to provide care for your patient.\par \par Sincerely,\par  \par Blaine Giron MD, MHCM, FACP\par Pulmonary Medicine\par  of Medicine\par [employer] at hospitals/[facility]\par \par [email or web address]\par Multi-Specialties at [place]\par \par  [DrDami  ___] : Dr. MALDONADO

## 2020-12-09 NOTE — HISTORY OF PRESENT ILLNESS
[TextBox_4] : The patient is a 70-year-old gentleman who is status post chylous effusion on the right side in 2013\par \par He has been seen here for shortness of breath\par \par His primary issue has been his cardiac status and is followed by Dr. Richardson\par He is on a new cardiac medication but the patient does not know what it is\par The patient had insertion of a loop recorder within the month\par \par The patient has baseline shortness of breath on exertion which was not responsive to inhaled albuterol\par Pulmonary functions did not show a significant amount of obstructive airways disease at any time\par \par His last chest x-ray was reviewed which demonstrates pleural thickening at the right base but no change from the film from 2018. There was no evidence of congestive heart failure

## 2020-12-09 NOTE — ASSESSMENT
[FreeTextEntry1] : The patient is a very pleasant 70-year-old man, ex-smoker with a history of right pleural effusion\par \par His shortness of breath appears to be primarily due to his cardiac status and his being overweight\par \par There does not appear to be a significant amount of pulmonary disease\par Although he has pleural thickening which represents scarring from his previous pleural effusion and pleurodescis, it does not appear that she has significant restriction disease noted on pulmonary function testing\par \par He was a smoker but there does not appear to be a significant amount of COPD\par \par At this time I do not have anything else to offer this gentleman\par He requires close followup from a cardiac standpoint\par \par I told him I will be available to see him at any time in the future as required

## 2021-08-30 ENCOUNTER — EMERGENCY (EMERGENCY)
Facility: HOSPITAL | Age: 71
LOS: 1 days | Discharge: HOME | End: 2021-08-30
Attending: STUDENT IN AN ORGANIZED HEALTH CARE EDUCATION/TRAINING PROGRAM
Payer: MEDICARE

## 2021-08-30 VITALS
OXYGEN SATURATION: 96 % | RESPIRATION RATE: 18 BRPM | WEIGHT: 235.01 LBS | SYSTOLIC BLOOD PRESSURE: 144 MMHG | HEART RATE: 85 BPM | TEMPERATURE: 99 F | HEIGHT: 69 IN | DIASTOLIC BLOOD PRESSURE: 77 MMHG

## 2021-08-30 DIAGNOSIS — Z98.890 OTHER SPECIFIED POSTPROCEDURAL STATES: Chronic | ICD-10-CM

## 2021-08-30 PROCEDURE — 99283 EMERGENCY DEPT VISIT LOW MDM: CPT

## 2021-08-30 RX ORDER — CEPHALEXIN 500 MG
1 CAPSULE ORAL
Qty: 20 | Refills: 0
Start: 2021-08-30 | End: 2021-09-08

## 2021-08-30 RX ORDER — CEPHALEXIN 500 MG
500 CAPSULE ORAL EVERY 12 HOURS
Refills: 0 | Status: DISCONTINUED | OUTPATIENT
Start: 2021-08-30 | End: 2021-09-03

## 2021-08-30 RX ORDER — MECLIZINE HCL 12.5 MG
25 TABLET ORAL ONCE
Refills: 0 | Status: COMPLETED | OUTPATIENT
Start: 2021-08-30 | End: 2021-08-30

## 2021-08-30 RX ORDER — OFLOXACIN OTIC SOLUTION 3 MG/ML
5 SOLUTION/ DROPS AURICULAR (OTIC)
Qty: 1 | Refills: 0
Start: 2021-08-30 | End: 2021-09-08

## 2021-08-30 RX ADMIN — Medication 500 MILLIGRAM(S): at 07:58

## 2021-08-30 RX ADMIN — Medication 25 MILLIGRAM(S): at 08:06

## 2021-08-30 NOTE — ED PROVIDER NOTE - NSICDXFAMILYHX_GEN_ALL_CORE_FT
FAMILY HISTORY:  No pertinent family history in first degree relatives, no fam hx of SVT in mother and father

## 2021-08-30 NOTE — ED PROVIDER NOTE - PATIENT PORTAL LINK FT
You can access the FollowMyHealth Patient Portal offered by [facility] by registering at the following website: [email or web address]. By joining [employer]’s FollowMyHealth portal, you will also be able to view your health information using other applications (apps) compatible with our system. You can access the FollowMyHealth Patient Portal offered by [facility] by registering at the following website: [email or web address]. By joining [employer]’s FollowMyHealth portal, you will also be able to view your health information using other applications (apps) compatible with our system.

## 2021-08-30 NOTE — ED PROVIDER NOTE - NSICDXPASTMEDICALHX_GEN_ALL_CORE_FT
PAST MEDICAL HISTORY:  DDD (degenerative disc disease), lumbar     Hypothyroidism, unspecified type     Lymphedema of left lower extremity     Pleural effusion on right     Tobacco dependence due to cigarettes

## 2021-08-30 NOTE — ED ADULT TRIAGE NOTE - CHIEF COMPLAINT QUOTE
pt states he has cellulitis of L ear that has moved into throat making it hard to swallow when laying down. no signs of infection to L ear. states similar episode has happened in the past.

## 2021-08-30 NOTE — ED PROVIDER NOTE - OBJECTIVE STATEMENT
Patient is a 71 year old male who presents c/o right ear pain. patient states pain radiates to throat x 3 days.  patient was seen by ENT last month for same symptoms, placed on PO abx and Bactroban with relief.  patient states using cream for past 3 days

## 2021-08-30 NOTE — ED PROVIDER NOTE - ATTENDING CONTRIBUTION TO CARE
70 yo male whom is well appearing but feeling anxious regarding several days of right ear pain making it difficult to sleep normally. I personally saw the patient with the PA, and completed the key components of the history and physical exam. I then discussed the management plan with the PA.

## 2021-08-30 NOTE — ED PROVIDER NOTE - NSICDXPASTSURGICALHX_GEN_ALL_CORE_FT
PAST SURGICAL HISTORY:  H/O Spinal surgery     S/P inguinal hernia repair     S/P right knee surgery     S/P rotator cuff repair

## 2021-10-06 NOTE — PATIENT PROFILE ADULT - NSSCCAGEEYEOPENER_GEN_A_NUR
HUB RELAYED MESSAGE   
HUB TO READ    ----- Message from Kortney Ferrera MD sent at 10/1/2021  4:57 PM EDT -----    Mild plaquing R Carotid Left is clear and no stenosis in either side.    
no

## 2021-10-20 ENCOUNTER — APPOINTMENT (OUTPATIENT)
Dept: PULMONOLOGY | Facility: CLINIC | Age: 71
End: 2021-10-20
Payer: MEDICARE

## 2021-10-20 VITALS
HEIGHT: 69.5 IN | DIASTOLIC BLOOD PRESSURE: 60 MMHG | WEIGHT: 230 LBS | HEART RATE: 83 BPM | RESPIRATION RATE: 16 BRPM | SYSTOLIC BLOOD PRESSURE: 130 MMHG | BODY MASS INDEX: 33.3 KG/M2 | OXYGEN SATURATION: 97 %

## 2021-10-20 DIAGNOSIS — E66.9 OBESITY, UNSPECIFIED: ICD-10-CM

## 2021-10-20 DIAGNOSIS — R06.02 SHORTNESS OF BREATH: ICD-10-CM

## 2021-10-20 DIAGNOSIS — G47.19 OTHER HYPERSOMNIA: ICD-10-CM

## 2021-10-20 DIAGNOSIS — G47.00 INSOMNIA, UNSPECIFIED: ICD-10-CM

## 2021-10-20 DIAGNOSIS — G47.33 OBSTRUCTIVE SLEEP APNEA (ADULT) (PEDIATRIC): ICD-10-CM

## 2021-10-20 PROCEDURE — 99214 OFFICE O/P EST MOD 30 MIN: CPT

## 2021-10-20 RX ORDER — ZOLPIDEM TARTRATE 5 MG/1
5 TABLET ORAL AT BEDTIME
Qty: 2 | Refills: 0 | Status: ACTIVE | COMMUNITY
Start: 2021-10-20 | End: 1900-01-01

## 2021-10-20 NOTE — HISTORY OF PRESENT ILLNESS
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  Small airways dysfunction. Mild compartment restriction. Mild decreased diffusion\par No previous response to albuterol \par 10 pack year smoker (1/2 PPD for 20 years).  DC 4/20/21\par Obese\par Fatigue\par CTA on 7/14/20: Stable enlarge PA's. No PE. Chronic diffuse bronchial wall thickening and interstitial thickening RML and RLL with volume loss\par [facility]\par Now on Gabapentin for neuropathy\par Dyspnea better post DC cigarettes  [ESS] : 10

## 2021-10-20 NOTE — DISCUSSION/SUMMARY
[FreeTextEntry1] : Respiratory Bronchiolitis ILD improving post DC cigarettes\par SINA with neuropathy and EDS more than dyspnea\par Obesity and deconditioning

## 2021-10-20 NOTE — CONSULT LETTER
[Dear  ___] : Dear  [unfilled], [Consult Letter:] : I had the pleasure of evaluating your patient, [unfilled]. [Please see my note below.] : Please see my note below. [Consult Closing:] : Thank you very much for allowing me to participate in the care of this patient.  If you have any questions, please do not hesitate to contact me. [Sincerely,] : Sincerely, [DrDami  ___] : Dr. MALDONADO

## 2021-11-13 ENCOUNTER — APPOINTMENT (OUTPATIENT)
Dept: DISASTER EMERGENCY | Facility: CLINIC | Age: 71
End: 2021-11-13

## 2021-11-17 ENCOUNTER — APPOINTMENT (OUTPATIENT)
Dept: PULMONOLOGY | Facility: CLINIC | Age: 71
End: 2021-11-17

## 2023-06-19 ENCOUNTER — INPATIENT (INPATIENT)
Facility: HOSPITAL | Age: 73
LOS: 4 days | Discharge: HOME | DRG: 871 | End: 2023-06-24
Attending: FAMILY MEDICINE | Admitting: INTERNAL MEDICINE
Payer: MEDICARE

## 2023-06-19 VITALS
DIASTOLIC BLOOD PRESSURE: 57 MMHG | RESPIRATION RATE: 22 BRPM | SYSTOLIC BLOOD PRESSURE: 111 MMHG | HEART RATE: 116 BPM | OXYGEN SATURATION: 85 % | TEMPERATURE: 100 F

## 2023-06-19 DIAGNOSIS — I63.9 CEREBRAL INFARCTION, UNSPECIFIED: Chronic | ICD-10-CM

## 2023-06-19 DIAGNOSIS — J18.9 PNEUMONIA, UNSPECIFIED ORGANISM: ICD-10-CM

## 2023-06-19 DIAGNOSIS — Z98.890 OTHER SPECIFIED POSTPROCEDURAL STATES: Chronic | ICD-10-CM

## 2023-06-19 LAB
ALBUMIN SERPL ELPH-MCNC: 2.9 G/DL — LOW (ref 3.3–5.2)
ALBUMIN SERPL ELPH-MCNC: 3.1 G/DL — LOW (ref 3.3–5.2)
ALP SERPL-CCNC: 63 U/L — SIGNIFICANT CHANGE UP (ref 40–120)
ALP SERPL-CCNC: 70 U/L — SIGNIFICANT CHANGE UP (ref 40–120)
ALT FLD-CCNC: 10 U/L — SIGNIFICANT CHANGE UP
ALT FLD-CCNC: 10 U/L — SIGNIFICANT CHANGE UP
ANION GAP SERPL CALC-SCNC: 10 MMOL/L — SIGNIFICANT CHANGE UP (ref 5–17)
ANION GAP SERPL CALC-SCNC: 9 MMOL/L — SIGNIFICANT CHANGE UP (ref 5–17)
APPEARANCE UR: CLEAR — SIGNIFICANT CHANGE UP
APTT BLD: 27.1 SEC — LOW (ref 27.5–35.5)
APTT BLD: 28.6 SEC — SIGNIFICANT CHANGE UP (ref 27.5–35.5)
AST SERPL-CCNC: 18 U/L — SIGNIFICANT CHANGE UP
AST SERPL-CCNC: 18 U/L — SIGNIFICANT CHANGE UP
BACTERIA # UR AUTO: ABNORMAL
BASE EXCESS BLDV CALC-SCNC: -1 MMOL/L — SIGNIFICANT CHANGE UP (ref -2–3)
BASOPHILS # BLD AUTO: 0.02 K/UL — SIGNIFICANT CHANGE UP (ref 0–0.2)
BASOPHILS # BLD AUTO: 0.02 K/UL — SIGNIFICANT CHANGE UP (ref 0–0.2)
BASOPHILS NFR BLD AUTO: 0.2 % — SIGNIFICANT CHANGE UP (ref 0–2)
BASOPHILS NFR BLD AUTO: 0.2 % — SIGNIFICANT CHANGE UP (ref 0–2)
BILIRUB SERPL-MCNC: 0.3 MG/DL — LOW (ref 0.4–2)
BILIRUB SERPL-MCNC: <0.2 MG/DL — LOW (ref 0.4–2)
BILIRUB UR-MCNC: NEGATIVE — SIGNIFICANT CHANGE UP
BUN SERPL-MCNC: 15.7 MG/DL — SIGNIFICANT CHANGE UP (ref 8–20)
BUN SERPL-MCNC: 21.9 MG/DL — HIGH (ref 8–20)
CA-I SERPL-SCNC: 1.18 MMOL/L — SIGNIFICANT CHANGE UP (ref 1.15–1.33)
CALCIUM SERPL-MCNC: 8.6 MG/DL — SIGNIFICANT CHANGE UP (ref 8.4–10.5)
CALCIUM SERPL-MCNC: 9 MG/DL — SIGNIFICANT CHANGE UP (ref 8.4–10.5)
CHLORIDE BLDV-SCNC: 103 MMOL/L — SIGNIFICANT CHANGE UP (ref 96–108)
CHLORIDE SERPL-SCNC: 102 MMOL/L — SIGNIFICANT CHANGE UP (ref 96–108)
CHLORIDE SERPL-SCNC: 103 MMOL/L — SIGNIFICANT CHANGE UP (ref 96–108)
CK MB CFR SERPL CALC: 1.9 NG/ML — SIGNIFICANT CHANGE UP (ref 0–6.7)
CK MB CFR SERPL CALC: 2.6 NG/ML — SIGNIFICANT CHANGE UP (ref 0–6.7)
CK SERPL-CCNC: 295 U/L — HIGH (ref 30–200)
CK SERPL-CCNC: 340 U/L — HIGH (ref 30–200)
CO2 SERPL-SCNC: 23 MMOL/L — SIGNIFICANT CHANGE UP (ref 22–29)
CO2 SERPL-SCNC: 24 MMOL/L — SIGNIFICANT CHANGE UP (ref 22–29)
COLOR SPEC: YELLOW — SIGNIFICANT CHANGE UP
CREAT SERPL-MCNC: 0.84 MG/DL — SIGNIFICANT CHANGE UP (ref 0.5–1.3)
CREAT SERPL-MCNC: 1.08 MG/DL — SIGNIFICANT CHANGE UP (ref 0.5–1.3)
DIFF PNL FLD: ABNORMAL
EGFR: 72 ML/MIN/1.73M2 — SIGNIFICANT CHANGE UP
EGFR: 91 ML/MIN/1.73M2 — SIGNIFICANT CHANGE UP
EOSINOPHIL # BLD AUTO: 0.06 K/UL — SIGNIFICANT CHANGE UP (ref 0–0.5)
EOSINOPHIL # BLD AUTO: 0.08 K/UL — SIGNIFICANT CHANGE UP (ref 0–0.5)
EOSINOPHIL NFR BLD AUTO: 0.7 % — SIGNIFICANT CHANGE UP (ref 0–6)
EOSINOPHIL NFR BLD AUTO: 0.7 % — SIGNIFICANT CHANGE UP (ref 0–6)
EPI CELLS # UR: SIGNIFICANT CHANGE UP
GAS PNL BLDV: 132 MMOL/L — LOW (ref 136–145)
GAS PNL BLDV: SIGNIFICANT CHANGE UP
GLUCOSE BLDV-MCNC: 119 MG/DL — HIGH (ref 70–99)
GLUCOSE SERPL-MCNC: 114 MG/DL — HIGH (ref 70–99)
GLUCOSE SERPL-MCNC: 130 MG/DL — HIGH (ref 70–99)
GLUCOSE UR QL: NEGATIVE — SIGNIFICANT CHANGE UP
HCO3 BLDV-SCNC: 24 MMOL/L — SIGNIFICANT CHANGE UP (ref 22–29)
HCT VFR BLD CALC: 34.3 % — LOW (ref 39–50)
HCT VFR BLD CALC: 34.5 % — LOW (ref 39–50)
HCT VFR BLDA CALC: 34 % — SIGNIFICANT CHANGE UP
HGB BLD CALC-MCNC: 11.3 G/DL — LOW (ref 12.6–17.4)
HGB BLD-MCNC: 10.5 G/DL — LOW (ref 13–17)
HGB BLD-MCNC: 10.8 G/DL — LOW (ref 13–17)
IMM GRANULOCYTES NFR BLD AUTO: 0.5 % — SIGNIFICANT CHANGE UP (ref 0–0.9)
IMM GRANULOCYTES NFR BLD AUTO: 0.6 % — SIGNIFICANT CHANGE UP (ref 0–0.9)
INR BLD: 1.16 RATIO — SIGNIFICANT CHANGE UP (ref 0.88–1.16)
KETONES UR-MCNC: NEGATIVE — SIGNIFICANT CHANGE UP
LACTATE BLDV-MCNC: 1.3 MMOL/L — SIGNIFICANT CHANGE UP (ref 0.5–2)
LACTATE SERPL-SCNC: 1.5 MMOL/L — SIGNIFICANT CHANGE UP (ref 0.5–2)
LEUKOCYTE ESTERASE UR-ACNC: ABNORMAL
LYMPHOCYTES # BLD AUTO: 0.64 K/UL — LOW (ref 1–3.3)
LYMPHOCYTES # BLD AUTO: 0.77 K/UL — LOW (ref 1–3.3)
LYMPHOCYTES # BLD AUTO: 5.3 % — LOW (ref 13–44)
LYMPHOCYTES # BLD AUTO: 9.6 % — LOW (ref 13–44)
MAGNESIUM SERPL-MCNC: 2.2 MG/DL — SIGNIFICANT CHANGE UP (ref 1.6–2.6)
MCHC RBC-ENTMCNC: 26.4 PG — LOW (ref 27–34)
MCHC RBC-ENTMCNC: 26.5 PG — LOW (ref 27–34)
MCHC RBC-ENTMCNC: 30.4 GM/DL — LOW (ref 32–36)
MCHC RBC-ENTMCNC: 31.5 GM/DL — LOW (ref 32–36)
MCV RBC AUTO: 84.1 FL — SIGNIFICANT CHANGE UP (ref 80–100)
MCV RBC AUTO: 86.7 FL — SIGNIFICANT CHANGE UP (ref 80–100)
MONOCYTES # BLD AUTO: 0.6 K/UL — SIGNIFICANT CHANGE UP (ref 0–0.9)
MONOCYTES # BLD AUTO: 0.81 K/UL — SIGNIFICANT CHANGE UP (ref 0–0.9)
MONOCYTES NFR BLD AUTO: 6.7 % — SIGNIFICANT CHANGE UP (ref 2–14)
MONOCYTES NFR BLD AUTO: 7.5 % — SIGNIFICANT CHANGE UP (ref 2–14)
NEUTROPHILS # BLD AUTO: 10.4 K/UL — HIGH (ref 1.8–7.4)
NEUTROPHILS # BLD AUTO: 6.55 K/UL — SIGNIFICANT CHANGE UP (ref 1.8–7.4)
NEUTROPHILS NFR BLD AUTO: 81.5 % — HIGH (ref 43–77)
NEUTROPHILS NFR BLD AUTO: 86.5 % — HIGH (ref 43–77)
NITRITE UR-MCNC: POSITIVE
NT-PROBNP SERPL-SCNC: 783 PG/ML — HIGH (ref 0–300)
PCO2 BLDV: 42 MMHG — SIGNIFICANT CHANGE UP (ref 42–55)
PH BLDV: 7.37 — SIGNIFICANT CHANGE UP (ref 7.32–7.43)
PH UR: 6 — SIGNIFICANT CHANGE UP (ref 5–8)
PHOSPHATE SERPL-MCNC: 2.7 MG/DL — SIGNIFICANT CHANGE UP (ref 2.4–4.7)
PLATELET # BLD AUTO: 188 K/UL — SIGNIFICANT CHANGE UP (ref 150–400)
PLATELET # BLD AUTO: 208 K/UL — SIGNIFICANT CHANGE UP (ref 150–400)
PO2 BLDV: 64 MMHG — HIGH (ref 25–45)
POTASSIUM BLDV-SCNC: 4.5 MMOL/L — SIGNIFICANT CHANGE UP (ref 3.5–5.1)
POTASSIUM SERPL-MCNC: 4 MMOL/L — SIGNIFICANT CHANGE UP (ref 3.5–5.3)
POTASSIUM SERPL-MCNC: 4.5 MMOL/L — SIGNIFICANT CHANGE UP (ref 3.5–5.3)
POTASSIUM SERPL-SCNC: 4 MMOL/L — SIGNIFICANT CHANGE UP (ref 3.5–5.3)
POTASSIUM SERPL-SCNC: 4.5 MMOL/L — SIGNIFICANT CHANGE UP (ref 3.5–5.3)
PROCALCITONIN SERPL-MCNC: 0.1 NG/ML — SIGNIFICANT CHANGE UP (ref 0.02–0.1)
PROT SERPL-MCNC: 5.7 G/DL — LOW (ref 6.6–8.7)
PROT SERPL-MCNC: 6 G/DL — LOW (ref 6.6–8.7)
PROT UR-MCNC: 30 MG/DL
PROTHROM AB SERPL-ACNC: 13.5 SEC — HIGH (ref 10.5–13.4)
RAPID RVP RESULT: SIGNIFICANT CHANGE UP
RBC # BLD: 3.98 M/UL — LOW (ref 4.2–5.8)
RBC # BLD: 4.08 M/UL — LOW (ref 4.2–5.8)
RBC # FLD: 16.1 % — HIGH (ref 10.3–14.5)
RBC # FLD: 16.2 % — HIGH (ref 10.3–14.5)
RBC CASTS # UR COMP ASSIST: ABNORMAL /HPF (ref 0–4)
SAO2 % BLDV: 91.1 % — SIGNIFICANT CHANGE UP
SARS-COV-2 RNA SPEC QL NAA+PROBE: SIGNIFICANT CHANGE UP
SODIUM SERPL-SCNC: 134 MMOL/L — LOW (ref 135–145)
SODIUM SERPL-SCNC: 136 MMOL/L — SIGNIFICANT CHANGE UP (ref 135–145)
SP GR SPEC: 1.01 — SIGNIFICANT CHANGE UP (ref 1.01–1.02)
TROPONIN T SERPL-MCNC: <0.01 NG/ML — SIGNIFICANT CHANGE UP (ref 0–0.06)
UROBILINOGEN FLD QL: NEGATIVE — SIGNIFICANT CHANGE UP
WBC # BLD: 12.02 K/UL — HIGH (ref 3.8–10.5)
WBC # BLD: 8.04 K/UL — SIGNIFICANT CHANGE UP (ref 3.8–10.5)
WBC # FLD AUTO: 12.02 K/UL — HIGH (ref 3.8–10.5)
WBC # FLD AUTO: 8.04 K/UL — SIGNIFICANT CHANGE UP (ref 3.8–10.5)
WBC UR QL: ABNORMAL /HPF (ref 0–5)

## 2023-06-19 PROCEDURE — 71045 X-RAY EXAM CHEST 1 VIEW: CPT | Mod: 26

## 2023-06-19 PROCEDURE — 93010 ELECTROCARDIOGRAM REPORT: CPT | Mod: 76

## 2023-06-19 PROCEDURE — 99223 1ST HOSP IP/OBS HIGH 75: CPT

## 2023-06-19 PROCEDURE — 99285 EMERGENCY DEPT VISIT HI MDM: CPT

## 2023-06-19 PROCEDURE — 71250 CT THORAX DX C-: CPT | Mod: 26

## 2023-06-19 PROCEDURE — 93306 TTE W/DOPPLER COMPLETE: CPT | Mod: 26

## 2023-06-19 PROCEDURE — 70450 CT HEAD/BRAIN W/O DYE: CPT | Mod: 26

## 2023-06-19 PROCEDURE — 73564 X-RAY EXAM KNEE 4 OR MORE: CPT | Mod: 26,RT

## 2023-06-19 RX ORDER — OXYBUTYNIN CHLORIDE 5 MG
10 TABLET ORAL
Refills: 0 | Status: DISCONTINUED | OUTPATIENT
Start: 2023-06-19 | End: 2023-06-24

## 2023-06-19 RX ORDER — ACETAMINOPHEN 500 MG
1000 TABLET ORAL ONCE
Refills: 0 | Status: COMPLETED | OUTPATIENT
Start: 2023-06-19 | End: 2023-06-19

## 2023-06-19 RX ORDER — CEFTRIAXONE 500 MG/1
1000 INJECTION, POWDER, FOR SOLUTION INTRAMUSCULAR; INTRAVENOUS EVERY 24 HOURS
Refills: 0 | Status: COMPLETED | OUTPATIENT
Start: 2023-06-19 | End: 2023-06-24

## 2023-06-19 RX ORDER — IPRATROPIUM/ALBUTEROL SULFATE 18-103MCG
3 AEROSOL WITH ADAPTER (GRAM) INHALATION ONCE
Refills: 0 | Status: COMPLETED | OUTPATIENT
Start: 2023-06-19 | End: 2023-06-19

## 2023-06-19 RX ORDER — GABAPENTIN 400 MG/1
600 CAPSULE ORAL
Refills: 0 | Status: DISCONTINUED | OUTPATIENT
Start: 2023-06-19 | End: 2023-06-24

## 2023-06-19 RX ORDER — AZITHROMYCIN 500 MG/1
500 TABLET, FILM COATED ORAL EVERY 24 HOURS
Refills: 0 | Status: DISCONTINUED | OUTPATIENT
Start: 2023-06-19 | End: 2023-06-22

## 2023-06-19 RX ORDER — ONDANSETRON 8 MG/1
4 TABLET, FILM COATED ORAL EVERY 8 HOURS
Refills: 0 | Status: DISCONTINUED | OUTPATIENT
Start: 2023-06-19 | End: 2023-06-24

## 2023-06-19 RX ORDER — SODIUM CHLORIDE 9 MG/ML
1000 INJECTION INTRAMUSCULAR; INTRAVENOUS; SUBCUTANEOUS
Refills: 0 | Status: DISCONTINUED | OUTPATIENT
Start: 2023-06-19 | End: 2023-06-21

## 2023-06-19 RX ORDER — SODIUM CHLORIDE 9 MG/ML
1000 INJECTION, SOLUTION INTRAVENOUS ONCE
Refills: 0 | Status: COMPLETED | OUTPATIENT
Start: 2023-06-19 | End: 2023-06-19

## 2023-06-19 RX ORDER — IPRATROPIUM/ALBUTEROL SULFATE 18-103MCG
3 AEROSOL WITH ADAPTER (GRAM) INHALATION EVERY 6 HOURS
Refills: 0 | Status: DISCONTINUED | OUTPATIENT
Start: 2023-06-19 | End: 2023-06-24

## 2023-06-19 RX ORDER — ACETAMINOPHEN 500 MG
650 TABLET ORAL EVERY 6 HOURS
Refills: 0 | Status: DISCONTINUED | OUTPATIENT
Start: 2023-06-19 | End: 2023-06-24

## 2023-06-19 RX ORDER — SUCRALFATE 1 G
1 TABLET ORAL
Qty: 0 | Refills: 0 | DISCHARGE

## 2023-06-19 RX ORDER — CEFTRIAXONE 500 MG/1
1000 INJECTION, POWDER, FOR SOLUTION INTRAMUSCULAR; INTRAVENOUS EVERY 24 HOURS
Refills: 0 | Status: DISCONTINUED | OUTPATIENT
Start: 2023-06-19 | End: 2023-06-19

## 2023-06-19 RX ORDER — AZITHROMYCIN 500 MG/1
500 TABLET, FILM COATED ORAL ONCE
Refills: 0 | Status: COMPLETED | OUTPATIENT
Start: 2023-06-19 | End: 2023-06-19

## 2023-06-19 RX ORDER — CEFTRIAXONE 500 MG/1
1000 INJECTION, POWDER, FOR SOLUTION INTRAMUSCULAR; INTRAVENOUS ONCE
Refills: 0 | Status: COMPLETED | OUTPATIENT
Start: 2023-06-19 | End: 2023-06-19

## 2023-06-19 RX ORDER — LEVOTHYROXINE SODIUM 125 MCG
175 TABLET ORAL DAILY
Refills: 0 | Status: DISCONTINUED | OUTPATIENT
Start: 2023-06-19 | End: 2023-06-24

## 2023-06-19 RX ORDER — NICOTINE POLACRILEX 2 MG
1 GUM BUCCAL DAILY
Refills: 0 | Status: DISCONTINUED | OUTPATIENT
Start: 2023-06-19 | End: 2023-06-19

## 2023-06-19 RX ORDER — LEVOTHYROXINE SODIUM 125 MCG
1 TABLET ORAL
Qty: 0 | Refills: 0 | DISCHARGE

## 2023-06-19 RX ORDER — ENOXAPARIN SODIUM 100 MG/ML
40 INJECTION SUBCUTANEOUS EVERY 24 HOURS
Refills: 0 | Status: DISCONTINUED | OUTPATIENT
Start: 2023-06-19 | End: 2023-06-24

## 2023-06-19 RX ORDER — METHADONE HYDROCHLORIDE 40 MG/1
5 TABLET ORAL
Qty: 0 | Refills: 0 | DISCHARGE

## 2023-06-19 RX ORDER — LANOLIN ALCOHOL/MO/W.PET/CERES
3 CREAM (GRAM) TOPICAL AT BEDTIME
Refills: 0 | Status: DISCONTINUED | OUTPATIENT
Start: 2023-06-19 | End: 2023-06-24

## 2023-06-19 RX ORDER — ALPRAZOLAM 0.25 MG
1 TABLET ORAL
Refills: 0 | Status: DISCONTINUED | OUTPATIENT
Start: 2023-06-19 | End: 2023-06-24

## 2023-06-19 RX ORDER — ALPRAZOLAM 0.25 MG
1 TABLET ORAL
Qty: 0 | Refills: 0 | DISCHARGE

## 2023-06-19 RX ADMIN — Medication 3 MILLILITER(S): at 02:48

## 2023-06-19 RX ADMIN — Medication 1000 MILLIGRAM(S): at 03:55

## 2023-06-19 RX ADMIN — AZITHROMYCIN 255 MILLIGRAM(S): 500 TABLET, FILM COATED ORAL at 04:10

## 2023-06-19 RX ADMIN — Medication 1 MILLIGRAM(S): at 20:52

## 2023-06-19 RX ADMIN — GABAPENTIN 600 MILLIGRAM(S): 400 CAPSULE ORAL at 22:42

## 2023-06-19 RX ADMIN — GABAPENTIN 600 MILLIGRAM(S): 400 CAPSULE ORAL at 13:44

## 2023-06-19 RX ADMIN — SODIUM CHLORIDE 1000 MILLILITER(S): 9 INJECTION, SOLUTION INTRAVENOUS at 02:43

## 2023-06-19 RX ADMIN — SODIUM CHLORIDE 1000 MILLILITER(S): 9 INJECTION, SOLUTION INTRAVENOUS at 03:40

## 2023-06-19 RX ADMIN — CEFTRIAXONE 1000 MILLIGRAM(S): 500 INJECTION, POWDER, FOR SOLUTION INTRAMUSCULAR; INTRAVENOUS at 04:10

## 2023-06-19 RX ADMIN — Medication 10 MILLIGRAM(S): at 18:40

## 2023-06-19 RX ADMIN — GABAPENTIN 600 MILLIGRAM(S): 400 CAPSULE ORAL at 18:41

## 2023-06-19 RX ADMIN — SODIUM CHLORIDE 150 MILLILITER(S): 9 INJECTION INTRAMUSCULAR; INTRAVENOUS; SUBCUTANEOUS at 06:27

## 2023-06-19 RX ADMIN — Medication 400 MILLIGRAM(S): at 03:24

## 2023-06-19 RX ADMIN — Medication 3 MILLILITER(S): at 20:35

## 2023-06-19 RX ADMIN — Medication 1000 MILLIGRAM(S): at 03:40

## 2023-06-19 NOTE — ED PROVIDER NOTE - OBJECTIVE STATEMENT
73y Male with history of LLE lymphedema, chronic right knee pain, hypothyroid, COPD BIBEMS presenting with generalized weakness and shortness of breath with multiple falls in the past week. Patient was found on floor by EMS and noted to be hypoxic to 88% room air. Denies fevers, chills, headache, chest pain, palpitations, cough, nausea, vomiting, diarrhea, hematuria, dysuria, dark stools, focal neurologic symptoms. Noted to be tachycardic and febrile in triage.

## 2023-06-19 NOTE — ED PROVIDER NOTE - NS ED MD DISPO ISOLATION TYPES
Santy Mock  CARDIOVASCULAR DISEASE  [address] [place] [address]  Phone: [phone]  Fax: [phone]  Follow Up Time:     Reid Piedra  [address] [address]  Phone: [phone]  Fax: [phone]  Follow Up Time:     Rj Mills  INTERNAL MEDICINE  [address] [place] [id number]  Phone: [phone]  Fax: [phone]  Follow Up Time:
None

## 2023-06-19 NOTE — H&P ADULT - ASSESSMENT
73y Male w/ PMHx of LLE lymphedema, chronic RT knee pain, hypothyroidism, DDD, HFpEF, Hx of SVT, hx of RT pleural effusion, and COPD (not on home O2) presents to [facility] ED for generalized weakness, SOB, RT knee pain w/ hx of falls admitted for PNA w/ SIRS criteria.         Plan:    SIRS w/ ? RLL PNA   -Admit to tele  -   -SIRS criteria in ED   -WBC 12.02, trops negative x 1   -ABG pH 7.320/ pCO2 42/ pO2 64/HCO3 24/ lactae 1.6   -2 L LR IVF bolus given   -Duoneb tx x 3 given in ED  -ofirmev x 1 given   -RVP negative   -CXR ?RLL, c/w prior CXR, final read pending   -CT chest w/o contrast ordered   -c/w azithromycin 500 mg IVP for CAP   -c/w Rocephin 1 gram IVP x 1 for CAP   -c/w duoneb q6h  -UA pending   -Trend lactate, WBC     New LBBB  -EKG w/ Sinus tachycardia, LAE, new LBBB @ 101    -Prior EKG showed L fasicular blocks  - trop x1 negative   -trend troponin and cardiac enzymes  -[facility] cardio consulted      Rhabdomyolysis   - NS .9% @ 150 ml/hr   -trend CPK   -UA pending     Frequent Falls w/ gait instability 2/2 RT knee pain   -CTH w/o acute infarct or hemorrhage   -RT knee x-ray pending   -orthostatics ordered   -Fall precautions  -ambulate w/ assistance   -pt ambulates with cane     -COPD w/o exacerbation   -c/w duoneb q6h  -c/w azithromycin   -c/w rocephin   -CT chest w/o contrast pending     #Neuropathy   -c/w gabapentin 600 mg q4h, consider change in dose in setting of gait instability    #Hypothyroidism  -c/w levothyroxine 175 mcg QD     #HTN, CAD  -c/w enalapril 2.5 mg QD   -c/w aspirin 81 mg QD     # Overactive bladder   -c/w oxybutynin 10 mg BID     VTE prophylaxis: SCD and Lovenox 40 mg SQ     73y Male w/ PMHx of LLE lymphedema, chronic RT knee pain, hypothyroidism, DDD, HFpEF, Hx of SVT, hx of RT pleural effusion, and COPD (not on home O2) presents to [facility] ED for SOB/MAZARIEGOS and Recurrent Falls admitted for Sepsis 2/2 CAP, New LBBB, Rhabdomyolysis, and R knee pain.    Plan:    Sepsis and Acute Hypoxic Respiratory Failure 2/2 CAP  -Desatting 85% on RA -> improved to mid 90s on 3LPM O2  -Admit to tele/  -Tachycardia/Tachypnea/Fever/Hypoxia/WBC + RLL infiltrate   -CXR +RLL Infiltrate read pending  -VBG pH 7.320 WNL  -Lactate negative  -2 L LR IVF bolus given   -Duonebs q6 ATC  -Ofirmev x1 given for fever  -COVID/RVP negative   -Check Atypicals  -BCX x2 Pending  -No reported sputum to culture  -Check CT Chest WO to evaluate RLL infiltrate (appears chronic on prior CXR) -> pending  -Azithromycin 500 mg IVP for CAP   -Rocephin 1 gram IVP x 1 for CAP     New LBBB  -EKG w/ Sinus tachycardia 101bpm, LAE, new LBBB     -Prior EKG showed L fascicular blocks  -Trend Cardiac Enzymes  -Check TTE  -Monitor Telemetry for LBBB  -Cardiology Consulted ([facility])    Rhabdomyolysis   -Likely 2/2 recurrent falls/downtime  - 2L IVFB NSS in ED  -Cont IVF NSS 150cc/hr  -Check UA for myoglobinuria  -Trend CPK    Frequent Falls, R Knee Pain  -CTH WO no acute changes  -R knee XRs pending   -Check orthostatics  -Fall precautions  -ambulate w/ assistance   -pt ambulates with cane     COPD  -No wheezing to suggest active exacerbation  -Cont Duonebs. Plan as above.    Neuropathy   -Gabapentin 600mg q4hrs -> monitor for sedation  -Fentanyl 25mcg q24 transdermal     Hypothyroidism  -c/w levothyroxine 175 mcg QD     HTN, CAD  -c/w enalapril 2.5 mg QD   -c/w aspirin 81 mg QD     Overactive bladder   -Oxybutynin 10 mg BID     Tobacco/Nicotine Abuse  -Vapes daily.  Cessation.     VTE prophylaxis: SCD and Lovenox 40 mg SQ

## 2023-06-19 NOTE — CHART NOTE - NSCHARTNOTEFT_GEN_A_CORE
72 y/o M with PMH of LLE lymphedema, chronic RT knee pain, hypothyroidism, DDD, HFpEF, Hx of SVT, hx of RT pleural effusion, and COPD (not on home O2) admitted for acute hypoxic respiratory failure secondary to CAP. Patient seen and examined at bedside. No acute overnight events reported. Agree with current plan of care.     Acute hypoxic respiratory failure secondary to CAP  - Supplemental O2 as required  - Follow blood cultures in process  - Continue Azithromycin/Ceftriaxone    New LBBB  - Telemetry monitoring   - TTE pending   - Cardiology recs appreciated

## 2023-06-19 NOTE — H&P ADULT - NSICDXPASTMEDICALHX_GEN_ALL_CORE_FT
PAST MEDICAL HISTORY:  COPD, mild     DDD (degenerative disc disease), lumbar     FHx: SVT (supraventricular tachycardia)     Heart failure     Hypothyroidism, unspecified type     Lymphedema of left lower extremity     Pleural effusion on right     Tobacco dependence due to cigarettes

## 2023-06-19 NOTE — ED PROVIDER NOTE - CLINICAL SUMMARY MEDICAL DECISION MAKING FREE TEXT BOX
73y Male with generalized weakness and shortness of breath with multiple falls in the setting of COPD noted to be hypoxic to room air and febrile. Hemodynamically stable, neurovascularly intact, abdomen soft and nontender. Differential diagnosis includes but not limited to COPD exacerbation, PNA, UTI, metabolic derangement. 73y Male with generalized weakness and shortness of breath with multiple falls in the setting of COPD noted to be hypoxic to room air and febrile. Hemodynamically stable, neurovascularly intact, abdomen soft and nontender, full ROM R knee. Differential diagnosis includes but not limited to COPD exacerbation, PNA, UTI, metabolic derangement. 73y Male with generalized weakness and shortness of breath with multiple falls in the setting of COPD noted to be hypoxic to room air and febrile. Hemodynamically stable, neurovascularly intact, abdomen soft and nontender, full ROM R knee. Differential diagnosis includes but not limited to COPD exacerbation, PNA, UTI, metabolic derangement. Labs and imaging independently reviewed, treating for PNA. Telemetry admit.

## 2023-06-19 NOTE — CONSULT NOTE ADULT - SUBJECTIVE AND OBJECTIVE BOX
[facility]                                   [address] [place] [address]                                                      PHONE: [phone]                                                         FAX: [phone]  [email or web address] [facility], Edwin [email or web address] [email or web address]  ---------------------------------------------------------------------------------------------------------------------------------    HPI:  BLAS SUAZO is an 73y Male with a hx of SVT, HFpEF, COPD (not on O2) p/w weakness.  Patient denies fever, chills or cough at home.  He reports chronic MAZARIEGOS.  No orthopnea or LE edema.  Here, he was noted to be hypoxic to 80s and found to have a fever.  He was started on ceftriaxone/azithromycin.  Patient reports feeling better today.  He denies chest pain or dyspnea.     PAST MEDICAL & SURGICAL HISTORY:  DDD (degenerative disc disease), lumbar      Lymphedema of left lower extremity      Tobacco dependence due to cigarettes      Hypothyroidism, unspecified type      Pleural effusion on right      Heart failure      COPD, mild      FHx: SVT (supraventricular tachycardia)      H/O Spinal surgery      S/P rotator cuff repair      S/P inguinal hernia repair      S/P right knee surgery          penicillin (Swelling)  Darvon (Swelling)      MEDICATIONS  (STANDING):  albuterol/ipratropium for Nebulization 3 milliLiter(s) Nebulizer every 6 hours  azithromycin  IVPB 500 milliGRAM(s) IV Intermittent every 24 hours  cefTRIAXone Injectable. 1000 milliGRAM(s) IV Push every 24 hours  enalapril 2.5 milliGRAM(s) Oral daily  enoxaparin Injectable 40 milliGRAM(s) SubCutaneous every 24 hours  fentaNYL   Patch  25 MICROgram(s)/Hr 1 Patch Transdermal every 72 hours  gabapentin 600 milliGRAM(s) Oral four times a day  levothyroxine 175 MICROGram(s) Oral daily  oxybutynin 10 milliGRAM(s) Oral two times a day  sodium chloride 0.9%. 1000 milliLiter(s) (150 mL/Hr) IV Continuous <Continuous>    MEDICATIONS  (PRN):  acetaminophen     Tablet .. 650 milliGRAM(s) Oral every 6 hours PRN Temp greater or equal to 38C (100.4F), Mild Pain (1 - 3)  aluminum hydroxide/magnesium hydroxide/simethicone Suspension 30 milliLiter(s) Oral every 4 hours PRN Dyspepsia  melatonin 3 milliGRAM(s) Oral at bedtime PRN Insomnia  ondansetron Injectable 4 milliGRAM(s) IV Push every 8 hours PRN Nausea and/or Vomiting      Family History: Pt denies hx of early cad, SCD, or congenital heart disease.      Social History:  Cigarettes:          former cigarette smoker; +vaping          Alchohol:   no              Illicit Drug Abuse:  no    ROS:    Extensively Reviewed with pertinents as per HPI the remainder were negative.      Vital Signs Last 24 Hrs  T(C): 36.3 (2023 08:05), Max: 38 (2023 02:18)  T(F): 97.4 (2023 08:05), Max: 100.4 (2023 02:18)  HR: 98 (2023 08:05) (98 - 117)  BP: 114/68 (2023 08:05) (111/56 - 127/62)  BP(mean): --  RR: 20 (2023 08:05) (20 - 24)  SpO2: 98% (2023 08:05) (85% - 98%)    Parameters below as of 2023 08:05  Patient On (Oxygen Delivery Method): nasal cannula  O2 Flow (L/min): 3    ICU Vital Signs Last 24 Hrs  BLAS GABRIELE  I&O's Detail    I&O's Summary    Drug Dosing Weight  BLAS SUAZO      PHYSICAL EXAM:  General: Appears well developed, alert and cooperative.  HEENT: Head; normocephalic, atraumatic.  Eyes: Pupils reactive, cornea wnl.  Neck: Supple, no nodes adenopathy, no JVD, no carotid bruit  CARDIOVASCULAR: Normal S1 and S2, No murmur, rub, gallop or lift.   LUNGS: No rales, rhonchi or wheeze. Normal breath sounds bilaterally.  ABDOMEN: Soft, nontender, nondistended  EXTREMITIES: No clubbing or edema. Distal pulses wnl.   SKIN: warm and dry with normal turgor.  NEURO: Alert/oriented x 3/normal motor exam.   PSYCH: normal affect.        LABS:                        10.8   12.02 )-----------( 208      ( 2023 02:30 )             34.3     06-19    134<L>  |  102  |  21.9<H>  ----------------------------<  114<H>  4.5   |  23.0  |  1.08    Ca    9.0      2023 02:30    TPro  6.0<L>  /  Alb  3.1<L>  /  TBili  0.3<L>  /  DBili  x   /  AST  18  /  ALT  10  /  AlkPhos  70  -    BLAS SUAZO  CARDIAC MARKERS ( 2023 06:42 )  x     / <0.01 ng/mL / 340 U/L / x     / 2.6 ng/mL  CARDIAC MARKERS ( 2023 02:30 )  x     / <0.01 ng/mL / 295 U/L / x     / 1.9 ng/mL      PT/INR - ( 2023 02:30 )   PT: 13.5 sec;   INR: 1.16 ratio         PTT - ( 2023 02:30 )  PTT:27.1 sec  Urinalysis Basic - ( 2023 06:08 )    Color: Yellow / Appearance: Clear / S.015 / pH: x  Gluc: x / Ketone: Negative  / Bili: Negative / Urobili: Negative   Blood: x / Protein: 30 mg/dL / Nitrite: Positive   Leuk Esterase: Trace / RBC: 3-5 /HPF / WBC 6-10 /HPF   Sq Epi: x / Non Sq Epi: x / Bacteria: Occasional        RADIOLOGY & ADDITIONAL STUDIES: cxr reveiewed- loop recorder, R mid and lower lobe infiltrates    INTERPRETATION OF TELEMETRY (personally reviewed):    ECG: Sinus tachycardia, left axis deviation, lbbb    ECHO:    STRESS TEST:    CARDIAC CATHETERIZATION:    Assessment and Plan:  In summary, BLAS SUAZO is an 73y Male with past medical history significant for HFpEF, SVT, COPD p/w weakenss and mazariegos with fever and hypoxia found to have lbbb.    LBBB- troponin is negative.  EKG in our office from last year also reports LBBB.  EKG looks similar to the one in our chart from 2020.  This is unlikely to contribute to patients current presenation.    Sinus tachycardia- likely related to infection.  Patient started on antibiotics    Please call us back with any further questions.       Thank you for allowing [facility] to participate in the care of this patient.  Please feel free to call with any questions or concerns.

## 2023-06-19 NOTE — ED ADULT NURSE NOTE - NSFALLRISKINTERV_ED_ALL_ED

## 2023-06-19 NOTE — H&P ADULT - NSHPPHYSICALEXAM_GEN_ALL_CORE
T(C): 36.5 (06-19-23 @ 05:29), Max: 38 (06-19-23 @ 02:18)  HR: 110 (06-19-23 @ 05:29) (110 - 117)  BP: 127/62 (06-19-23 @ 05:29) (111/56 - 127/62)  RR: 22 (06-19-23 @ 05:29) (22 - 24)  SpO2: 96% (06-19-23 @ 05:29) (85% - 96%)    CONSTITUTIONAL: in no apparent distress   EYES: PERRLA and symmetric, EOMI, No conjunctival or scleral injection, non-icteric  ENMT: Oral mucosa with moist membranes. Normal dentition; no pharyngeal injection or exudates             NECK: Supple, symmetric and without tracheal deviation   RESP: No respiratory distress, no use of accessory muscles; CTA b/l, no WRR  CV: RRR, +S1S2, no MRG; no JVD  VASC: +1 pitting edema of the RT LE, LT LE compression stocking on LT lymphedema   GI: Soft, NT, ND, no rebound, no guarding  MSK: Normal gait; No digital clubbing or cyanosis; examination of the (head/neck/spine/ribs/pelvis, RUE, LUE, RLE, LLE) without misalignment,            Normal ROM without pain, no spinal tenderness, normal muscle strength/tone, erythematous RT knee w/ excoriation   SKIN: No rashes or ulcers noted; excoriation noted RT hand and RT LE  NEURO: CN II-XII intact; normal reflexes in upper and lower extremities, sensation intact in upper and lower extremities b/l to light touch   PSYCH: Appropriate insight/judgment; A+O x 3, mood and affect appropriate, recent/remote memory intact T(C): 36.5 (06-19-23 @ 05:29), Max: 38 (06-19-23 @ 02:18)  HR: 110 (06-19-23 @ 05:29) (110 - 117)  BP: 127/62 (06-19-23 @ 05:29) (111/56 - 127/62)  RR: 22 (06-19-23 @ 05:29) (22 - 24)  SpO2: 96% (06-19-23 @ 05:29) (85% - 96%)    CONSTITUTIONAL: in no apparent distress   EYES: PERRLA and symmetric, EOMI, No conjunctival or scleral injection, non-icteric  ENMT: Oral mucosa with moist membranes. Normal dentition; no pharyngeal injection or exudates             NECK: Supple, symmetric and without tracheal deviation   RESP: +Decreased BS BL R>L  CV: RRR, +S1S2, no MRG; no JVD  VASC: +1 pitting edema of the RT LE, LT LE compression stocking on LT lymphedema   GI: Soft, NT, ND, no rebound, no guarding  MSK: Normal gait; No digital clubbing or cyanosis; examination of the (head/neck/spine/ribs/pelvis, RUE, LUE, RLE, LLE) without misalignment,            Normal ROM without pain, no spinal tenderness, normal muscle strength/tone, erythematous RT knee w/ excoriation   SKIN: No rashes or ulcers noted; excoriation noted RT hand and RT LE  NEURO: CN II-XII intact; normal reflexes in upper and lower extremities, sensation intact in upper and lower extremities b/l to light touch   PSYCH: Appropriate insight/judgment; A+O x 3, mood and affect appropriate, recent/remote memory intact

## 2023-06-19 NOTE — ED ADULT NURSE NOTE - OBJECTIVE STATEMENT
Pt. BIBA for fall due to weakness and right knee pain over the last two weeks. Pt. states he fell today and wasn't able to get up for 30 minutes. Pt. is tachypnea, with MAZARIEGOS, febrile on arrival to ED. Pt. is not able to move much in stretcher. Pt. room air sat 88%, improved to 95% with 3LNC. MD Gordillo at bedside for eval and sepsis work up initiated.

## 2023-06-19 NOTE — ED ADULT TRIAGE NOTE - CHIEF COMPLAINT QUOTE
BIBEMS from home s/p frequent falls over the past 3 days. Patient reports that his friend came to help him up and he fell again which prompted friend to call the ambulance. Patient reports knee pain, is SOB at triage saturating @85% on RA, orally febrile. Reports vomiting last week. Patient is malodorous and unkempt, MD Gordillo called to bedside.

## 2023-06-19 NOTE — H&P ADULT - NSHPLABSRESULTS_GEN_ALL_CORE
EKG: EKG: Sinus tachycardia, LAE w/ new LBBB @ 101      LABS:  06-19 @ 02:30 Creatine 295 U/L<H> [30 - 200]  cret                        10.8   12.02 )-----------( 208      ( 19 Jun 2023 02:30 )             34.3     06-19    134<L>  |  102  |  21.9<H>  ----------------------------<  114<H>  4.5   |  23.0  |  1.08    Ca    9.0      19 Jun 2023 02:30    TPro  6.0<L>  /  Alb  3.1<L>  /  TBili  0.3<L>  /  DBili  x   /  AST  18  /  ALT  10  /  AlkPhos  70  06-19    PT/INR - ( 19 Jun 2023 02:30 )   PT: 13.5 sec;   INR: 1.16 ratio         PTT - ( 19 Jun 2023 02:30 )  PTT:27.1 sec      ACC: [id number] EXAM:  CT BRAIN   ORDERED BY: DAMION CARMONA     PROCEDURE DATE:  06/19/2023          INTERPRETATION:  CLINICAL INFORMATION: Status post fall.    TECHNIQUE: Multiple axial sections were acquired from the base of the   skull to the vertex without contrast enhancement. Coronal and sagittal   reformats were additionally performed. Beam hardening artifact slightly   obscures evaluation of the posterior fossa and brainstem.    COMPARISON: None.    FINDINGS:  The lateral ventricleshave a normal configuration. Mild chronic   microvascular ischemic changes are identified. An old lacunar infarct is   seen in the left cerebellum. No acute territorial infarct is demonstrated.    There is no evidence of an acute hemorrhage or mass-effect in the   posterior fossa or in the supratentorial region.    A partially visualized expansile cystic lesion measuring 1.6 x 1.4 cm is   seen in the right maxilla possibly due to a dentigerous cyst. Evaluation   of the remaining osseous structures with the appropriate window appears   unremarkable. The visualized paranasal sinuses and mastoid air cells are   clear. Vascular calcifications are noted.    IMPRESSION:  No acute intracranial hemorrhage, territorial infarct, mass effect or   calvarial fracture.    --- End of Report ---    PATY OKEEFE MD; Attending Radiologist  This document has been electronically signed. Jun 19 2023  5:25AM

## 2023-06-19 NOTE — ED PROVIDER NOTE - ATTENDING CONTRIBUTION TO CARE
Rand: I performed a face to face bedside interview with patient regarding history of present illness, review of symptoms and past medical history. I completed an independent physical exam.  I have discussed patient's plan of care with resident.   I agree with note as stated above including HISTORY OF PRESENT ILLNESS, HIV, PAST MEDICAL/SURGICAL/FAMILY/SOCIAL HISTORY, ALLERGIES AND HOME MEDICATIONS, REVIEW OF SYSTEMS, PHYSICAL EXAM, MEDICAL DECISION MAKING and any PROGRESS NOTES during the time I functioned as the attending physician for this patient unless otherwise noted. My brief assessment is as follows: 73y Male with generalized weakness and shortness of breath with multiple falls in the setting of COPD noted to be hypoxic to room air and febrile. Hemodynamically stable, neurovascularly intact, abdomen soft and nontender, full ROM R knee. Differential diagnosis includes but not limited to COPD exacerbation, PNA, UTI, metabolic derangement.

## 2023-06-19 NOTE — H&P ADULT - NS PANP COMMENT GEN_ALL_CORE FT
Pt seen/examined. Changes made to docuemntation above otherwise agree with assessment/plan above.     73M BIBEMS for SOB/MAZARIEGOS and recurrent falls x6 x1 week admitted for Sepsis and Acute Hypoxic Resp Failure 2/2 RLL CAP, new LBBB, Recurrent Falls, R knee Pain, and mild acute Rhabdomyolysis. IV ABX for Ceftriaxone/Azithromycin. IVFB and maintenance IVF. BCX pending. No sputum. Fever improived with Ofirmev. Trend Labs. Check CT Chest for RLL infiltrate. TTE and Cardio Consult for new LBBB.  Trend CPK for rhabdo. Cont O2 via NC titrate for Spo2 88-96%. Monitor closely. Fall Precaution. CTH WO negative. PT consult for falls.

## 2023-06-19 NOTE — H&P ADULT - HISTORY OF PRESENT ILLNESS
73y Male w/ PMHx of LLE lymphedema, chronic RT knee pain, hypothyroidism, DDD, HFpEF, Hx of SVT, hx of RT pleural effusion, and COPD (not on home O2) presents to [facility] ED for generalized weakness and shortness of breath and RT knee pain with multiple falls x1 week. Pt states he has RT knee pain x 1 week, he has been using a cane w/ ambulation recently.He has found walking difficult with his pain and has had 6 falls, hitting his head without LOC. Pt also admits to "mild" SOB when walking 25 feet, he recently saw his cardiologist, who did an outpatient ECHO and carotid sonogram. He also admits to bilious emesis x 3 days which was self-resolving prior to his admission. Pt admits to urinary urgency and hesitancy x 1.5 months, he is currently on oxybutynin 10 mg BID. In the ED, Pt found to be hypoxic, tachycardic and febrile, SIR criteria met. Pertinent labs WBC 12.02, trops x1 negative. RVP negative. CXR + for RT LLL infiltrate. Pt started on azithromycin 500 mg IVP x1 and rocephin 1 gram IV x 1. Pt given 2L LR bolus, and ofirmev x 1. duoneb tx x 3 given. CTH negative for acute infarct. He denies sick contacts, recent travel. He denies CP, palpitations, N/V/D and abdominal pain at this time, dysuria, dizziness, HA or other complaints at this time.   73y Male w/ PMHx of LLE lymphedema, chronic RT knee pain, hypothyroidism, DDD, HFpEF, Hx of SVT, hx of RT pleural effusion, and COPD (not on home O2) presents to [facility] ED for generalized weakness and SOB/MAZARIEGOS and with recurrent falls x6 times this week. Patient noted to be hypoxic satting 85% on RA with tachycardia and tachypnea on arrival. +Febrile in ED. Patient treated with empiric IVF and IV ABX for sepsis likely due to CAP. CXR with possible R sided infiltrate. Leukocytosis on labs. Fever improved with Ofirmev. Pt also c/o R knee pain which is chronic but worse after multiple falls onto it. XR R Knee pending. CTH WO negative for acute findings. EKG shows new LBBB. He recently saw his Cardiologist for f/u. Ambulates with cane. He denies sick contacts, recent travel. He denies CP, palpitations, N/V/D and abdominal pain at this time, dysuria, dizziness, HA or other complaints at this time.

## 2023-06-19 NOTE — ED ADULT NURSE REASSESSMENT NOTE - NS ED NURSE REASSESS COMMENT FT1
assumed pt. care from Manju RAMOS.  Pt. c/o right knee pain/swelling/injury s/p multiple falls.  Pt. states over past 3 days has fallen 6 times, landed on same knee a few times.  Knee w/abrasions, very swollen, states pain 6/10.  GASTON in ED, able to bear weight.  pt. also SOB, was noted to be satting mid 80's, placed on O2, given neb txt.  Pt. denies ETOH use, states he vapes nicotine.

## 2023-06-19 NOTE — ED PROVIDER NOTE - PHYSICAL EXAMINATION
General: chronically ill appearing in no acute distress, alert and cooperative  Head: Normocephalic, atraumatic  Eyes: PERRLA, no conjunctival injection, no scleral icterus, EOMI  ENMT: Atraumatic external nose and ears  Neck: Soft and supple, full ROM without pain, no midline tenderness  Cardiac: tachycardic rate and regular rhythm, no murmurs, peripheral pulses 2+ and symmetric in all extremities, no LE edema.  Resp: labored respiratory effort, lungs CTAB  Abd: Soft, non-tender, non-distended  MSK: Spine midline and non-tender, no CVA tenderness   Skin: Warm and dry. abrasion to right knee  Neuro: AO x 3, moves all extremities symmetrically, Motor strength 5/5 bilaterally UE and LE, sensation grossly intact

## 2023-06-19 NOTE — H&P ADULT - NSICDXFAMILYHX_GEN_ALL_CORE_FT
FAMILY HISTORY:  Father  Still living? Unknown  Family history of CVA, Age at diagnosis: Age Unknown  Family history of early CAD, Age at diagnosis: Age Unknown    Mother  Still living? Unknown  Family history of early CAD, Age at diagnosis: Age Unknown

## 2023-06-20 LAB
ANION GAP SERPL CALC-SCNC: 7 MMOL/L — SIGNIFICANT CHANGE UP (ref 5–17)
BASOPHILS # BLD AUTO: 0.01 K/UL — SIGNIFICANT CHANGE UP (ref 0–0.2)
BASOPHILS NFR BLD AUTO: 0.1 % — SIGNIFICANT CHANGE UP (ref 0–2)
BUN SERPL-MCNC: 12.4 MG/DL — SIGNIFICANT CHANGE UP (ref 8–20)
CALCIUM SERPL-MCNC: 8.5 MG/DL — SIGNIFICANT CHANGE UP (ref 8.4–10.5)
CHLORIDE SERPL-SCNC: 105 MMOL/L — SIGNIFICANT CHANGE UP (ref 96–108)
CO2 SERPL-SCNC: 26 MMOL/L — SIGNIFICANT CHANGE UP (ref 22–29)
CREAT SERPL-MCNC: 0.78 MG/DL — SIGNIFICANT CHANGE UP (ref 0.5–1.3)
CULTURE RESULTS: SIGNIFICANT CHANGE UP
EGFR: 93 ML/MIN/1.73M2 — SIGNIFICANT CHANGE UP
EOSINOPHIL # BLD AUTO: 0.12 K/UL — SIGNIFICANT CHANGE UP (ref 0–0.5)
EOSINOPHIL NFR BLD AUTO: 1.5 % — SIGNIFICANT CHANGE UP (ref 0–6)
GLUCOSE SERPL-MCNC: 112 MG/DL — HIGH (ref 70–99)
HCT VFR BLD CALC: 31.6 % — LOW (ref 39–50)
HCV AB S/CO SERPL IA: 0.11 S/CO — SIGNIFICANT CHANGE UP (ref 0–0.99)
HCV AB SERPL-IMP: SIGNIFICANT CHANGE UP
HGB BLD-MCNC: 9.6 G/DL — LOW (ref 13–17)
IMM GRANULOCYTES NFR BLD AUTO: 0.6 % — SIGNIFICANT CHANGE UP (ref 0–0.9)
LYMPHOCYTES # BLD AUTO: 0.69 K/UL — LOW (ref 1–3.3)
LYMPHOCYTES # BLD AUTO: 8.6 % — LOW (ref 13–44)
MCHC RBC-ENTMCNC: 25.9 PG — LOW (ref 27–34)
MCHC RBC-ENTMCNC: 30.4 GM/DL — LOW (ref 32–36)
MCV RBC AUTO: 85.4 FL — SIGNIFICANT CHANGE UP (ref 80–100)
MONOCYTES # BLD AUTO: 0.59 K/UL — SIGNIFICANT CHANGE UP (ref 0–0.9)
MONOCYTES NFR BLD AUTO: 7.3 % — SIGNIFICANT CHANGE UP (ref 2–14)
MRSA PCR RESULT.: SIGNIFICANT CHANGE UP
NEUTROPHILS # BLD AUTO: 6.61 K/UL — SIGNIFICANT CHANGE UP (ref 1.8–7.4)
NEUTROPHILS NFR BLD AUTO: 81.9 % — HIGH (ref 43–77)
PLATELET # BLD AUTO: 191 K/UL — SIGNIFICANT CHANGE UP (ref 150–400)
POTASSIUM SERPL-MCNC: 4.7 MMOL/L — SIGNIFICANT CHANGE UP (ref 3.5–5.3)
POTASSIUM SERPL-SCNC: 4.7 MMOL/L — SIGNIFICANT CHANGE UP (ref 3.5–5.3)
RBC # BLD: 3.7 M/UL — LOW (ref 4.2–5.8)
RBC # FLD: 16.2 % — HIGH (ref 10.3–14.5)
S AUREUS DNA NOSE QL NAA+PROBE: SIGNIFICANT CHANGE UP
SODIUM SERPL-SCNC: 137 MMOL/L — SIGNIFICANT CHANGE UP (ref 135–145)
SPECIMEN SOURCE: SIGNIFICANT CHANGE UP
WBC # BLD: 8.07 K/UL — SIGNIFICANT CHANGE UP (ref 3.8–10.5)
WBC # FLD AUTO: 8.07 K/UL — SIGNIFICANT CHANGE UP (ref 3.8–10.5)

## 2023-06-20 PROCEDURE — 99233 SBSQ HOSP IP/OBS HIGH 50: CPT

## 2023-06-20 RX ADMIN — Medication 3 MILLILITER(S): at 19:55

## 2023-06-20 RX ADMIN — CEFTRIAXONE 1000 MILLIGRAM(S): 500 INJECTION, POWDER, FOR SOLUTION INTRAMUSCULAR; INTRAVENOUS at 03:02

## 2023-06-20 RX ADMIN — Medication 2.5 MILLIGRAM(S): at 05:44

## 2023-06-20 RX ADMIN — Medication 1 MILLIGRAM(S): at 08:32

## 2023-06-20 RX ADMIN — Medication 175 MICROGRAM(S): at 05:44

## 2023-06-20 RX ADMIN — ENOXAPARIN SODIUM 40 MILLIGRAM(S): 100 INJECTION SUBCUTANEOUS at 05:43

## 2023-06-20 RX ADMIN — Medication 10 MILLIGRAM(S): at 05:44

## 2023-06-20 RX ADMIN — Medication 1 MILLIGRAM(S): at 17:24

## 2023-06-20 RX ADMIN — Medication 3 MILLILITER(S): at 03:31

## 2023-06-20 RX ADMIN — GABAPENTIN 600 MILLIGRAM(S): 400 CAPSULE ORAL at 17:24

## 2023-06-20 RX ADMIN — AZITHROMYCIN 255 MILLIGRAM(S): 500 TABLET, FILM COATED ORAL at 03:10

## 2023-06-20 RX ADMIN — GABAPENTIN 600 MILLIGRAM(S): 400 CAPSULE ORAL at 11:59

## 2023-06-20 RX ADMIN — Medication 3 MILLILITER(S): at 09:10

## 2023-06-20 RX ADMIN — Medication 10 MILLIGRAM(S): at 17:24

## 2023-06-20 RX ADMIN — GABAPENTIN 600 MILLIGRAM(S): 400 CAPSULE ORAL at 05:47

## 2023-06-20 RX ADMIN — GABAPENTIN 600 MILLIGRAM(S): 400 CAPSULE ORAL at 23:00

## 2023-06-20 RX ADMIN — SODIUM CHLORIDE 150 MILLILITER(S): 9 INJECTION INTRAMUSCULAR; INTRAVENOUS; SUBCUTANEOUS at 03:01

## 2023-06-20 NOTE — PROGRESS NOTE ADULT - SUBJECTIVE AND OBJECTIVE BOX
Chief complain: PNA    Patient seen and examined at bedside. No acute overnight events reported. Patient state his cough is slightly improved. No fever, chills, chest pain, nausea or vomiting.     Vital Signs Last 24 Hrs  T(F): 98.7 (20 Jun 2023 08:38), Max: 99.4 (20 Jun 2023 05:03)  HR: 94 (20 Jun 2023 08:38) (74 - 105)  BP: 113/70 (20 Jun 2023 08:38) (108/55 - 129/67)  RR: 19 (20 Jun 2023 08:38) (19 - 20)  SpO2: 92% (20 Jun 2023 08:38) (92% - 97%)    Physical Exam:  Constitutional: alert and oriented, in no acute distress   Neck: Soft and supple  Respiratory: Coarse breath sounds b/l  Cardiovascular: Regular rate and rhyhtm  Gastrointestinal: Soft, non-tender to palpation, +bs  Vascular: 2+ peripheral pulses  Neurological: A/O x 3  Musculoskeletal: 5/5 strength b/l upper and lower extremities, no lower extremity edema bilaterally    Labs:                        9.6    8.07  )-----------( 191      ( 20 Jun 2023 02:57 )             31.6   06-20    137  |  105  |  12.4  ----------------------------<  112<H>  4.7   |  26.0  |  0.78    Ca    8.5      20 Jun 2023 02:57  Phos  2.7     06-19  Mg     2.2     06-19    TPro  5.7<L>  /  Alb  2.9<L>  /  TBili  <0.2<L>  /  DBili  x   /  AST  18  /  ALT  10  /  AlkPhos  63  06-19

## 2023-06-20 NOTE — PHARMACOTHERAPY INTERVENTION NOTE - COMMENTS
Recommended to change azithromycin to doxycycline 100 mg IV q12h since the QTc from 6/19 was 502 ms.    Loepz Stuart, PharmD, [facility]  Clinical Pharmacy Specialist, Infectious Diseases  Tele-Antimicrobial Stewardship Program (Tele-ASP)  Tele-ASP Phone: [phone]   As discussed on 6/21, recommended to change azithromycin to doxycycline 100 mg IV q12h since the QTc from 6/19 was 502 ms.    Lopez Stuart, PharmD, [facility]  Clinical Pharmacy Specialist, Infectious Diseases  Tele-Antimicrobial Stewardship Program (Tele-ASP)  Tele-ASP Phone: [phone]

## 2023-06-21 LAB
ANION GAP SERPL CALC-SCNC: 8 MMOL/L — SIGNIFICANT CHANGE UP (ref 5–17)
BASOPHILS # BLD AUTO: 0.02 K/UL — SIGNIFICANT CHANGE UP (ref 0–0.2)
BASOPHILS NFR BLD AUTO: 0.3 % — SIGNIFICANT CHANGE UP (ref 0–2)
BUN SERPL-MCNC: 9.3 MG/DL — SIGNIFICANT CHANGE UP (ref 8–20)
CALCIUM SERPL-MCNC: 8.9 MG/DL — SIGNIFICANT CHANGE UP (ref 8.4–10.5)
CHLORIDE SERPL-SCNC: 103 MMOL/L — SIGNIFICANT CHANGE UP (ref 96–108)
CO2 SERPL-SCNC: 26 MMOL/L — SIGNIFICANT CHANGE UP (ref 22–29)
CREAT SERPL-MCNC: 0.77 MG/DL — SIGNIFICANT CHANGE UP (ref 0.5–1.3)
EGFR: 93 ML/MIN/1.73M2 — SIGNIFICANT CHANGE UP
EOSINOPHIL # BLD AUTO: 0.17 K/UL — SIGNIFICANT CHANGE UP (ref 0–0.5)
EOSINOPHIL NFR BLD AUTO: 2.4 % — SIGNIFICANT CHANGE UP (ref 0–6)
GLUCOSE SERPL-MCNC: 101 MG/DL — HIGH (ref 70–99)
HCT VFR BLD CALC: 33.8 % — LOW (ref 39–50)
HGB BLD-MCNC: 10.3 G/DL — LOW (ref 13–17)
IMM GRANULOCYTES NFR BLD AUTO: 0.6 % — SIGNIFICANT CHANGE UP (ref 0–0.9)
LEGIONELLA AG UR QL: NEGATIVE — SIGNIFICANT CHANGE UP
LYMPHOCYTES # BLD AUTO: 0.69 K/UL — LOW (ref 1–3.3)
LYMPHOCYTES # BLD AUTO: 9.9 % — LOW (ref 13–44)
MCHC RBC-ENTMCNC: 26.1 PG — LOW (ref 27–34)
MCHC RBC-ENTMCNC: 30.5 GM/DL — LOW (ref 32–36)
MCV RBC AUTO: 85.8 FL — SIGNIFICANT CHANGE UP (ref 80–100)
MONOCYTES # BLD AUTO: 0.64 K/UL — SIGNIFICANT CHANGE UP (ref 0–0.9)
MONOCYTES NFR BLD AUTO: 9.2 % — SIGNIFICANT CHANGE UP (ref 2–14)
NEUTROPHILS # BLD AUTO: 5.39 K/UL — SIGNIFICANT CHANGE UP (ref 1.8–7.4)
NEUTROPHILS NFR BLD AUTO: 77.6 % — HIGH (ref 43–77)
PLATELET # BLD AUTO: 223 K/UL — SIGNIFICANT CHANGE UP (ref 150–400)
POTASSIUM SERPL-MCNC: 5.1 MMOL/L — SIGNIFICANT CHANGE UP (ref 3.5–5.3)
POTASSIUM SERPL-SCNC: 5.1 MMOL/L — SIGNIFICANT CHANGE UP (ref 3.5–5.3)
RBC # BLD: 3.94 M/UL — LOW (ref 4.2–5.8)
RBC # FLD: 16 % — HIGH (ref 10.3–14.5)
S PNEUM AG UR QL: NEGATIVE — SIGNIFICANT CHANGE UP
SODIUM SERPL-SCNC: 136 MMOL/L — SIGNIFICANT CHANGE UP (ref 135–145)
WBC # BLD: 6.95 K/UL — SIGNIFICANT CHANGE UP (ref 3.8–10.5)
WBC # FLD AUTO: 6.95 K/UL — SIGNIFICANT CHANGE UP (ref 3.8–10.5)

## 2023-06-21 PROCEDURE — 99232 SBSQ HOSP IP/OBS MODERATE 35: CPT

## 2023-06-21 RX ORDER — FUROSEMIDE 40 MG
40 TABLET ORAL ONCE
Refills: 0 | Status: COMPLETED | OUTPATIENT
Start: 2023-06-21 | End: 2023-06-21

## 2023-06-21 RX ADMIN — Medication 10 MILLIGRAM(S): at 17:19

## 2023-06-21 RX ADMIN — Medication 10 MILLIGRAM(S): at 05:00

## 2023-06-21 RX ADMIN — Medication 2.5 MILLIGRAM(S): at 05:00

## 2023-06-21 RX ADMIN — Medication 3 MILLILITER(S): at 20:48

## 2023-06-21 RX ADMIN — GABAPENTIN 600 MILLIGRAM(S): 400 CAPSULE ORAL at 11:05

## 2023-06-21 RX ADMIN — GABAPENTIN 600 MILLIGRAM(S): 400 CAPSULE ORAL at 17:18

## 2023-06-21 RX ADMIN — GABAPENTIN 600 MILLIGRAM(S): 400 CAPSULE ORAL at 05:01

## 2023-06-21 RX ADMIN — AZITHROMYCIN 255 MILLIGRAM(S): 500 TABLET, FILM COATED ORAL at 02:16

## 2023-06-21 RX ADMIN — ENOXAPARIN SODIUM 40 MILLIGRAM(S): 100 INJECTION SUBCUTANEOUS at 05:00

## 2023-06-21 RX ADMIN — Medication 40 MILLIGRAM(S): at 12:11

## 2023-06-21 RX ADMIN — Medication 3 MILLILITER(S): at 15:57

## 2023-06-21 RX ADMIN — Medication 175 MICROGRAM(S): at 05:01

## 2023-06-21 RX ADMIN — Medication 1 MILLIGRAM(S): at 05:00

## 2023-06-21 RX ADMIN — Medication 3 MILLILITER(S): at 08:45

## 2023-06-21 RX ADMIN — GABAPENTIN 600 MILLIGRAM(S): 400 CAPSULE ORAL at 23:10

## 2023-06-21 RX ADMIN — CEFTRIAXONE 1000 MILLIGRAM(S): 500 INJECTION, POWDER, FOR SOLUTION INTRAMUSCULAR; INTRAVENOUS at 02:17

## 2023-06-21 RX ADMIN — Medication 1 MILLIGRAM(S): at 17:18

## 2023-06-21 NOTE — PHARMACOTHERAPY INTERVENTION NOTE - COMMENTS
Modified penicillin allergy history to state that patient tolerated ceftriaxone during this admission.    Lopez Stuart, PharmD, [facility]  Clinical Pharmacy Specialist, Infectious Diseases  Tele-Antimicrobial Stewardship Program (Tele-ASP)  Tele-ASP Phone: [phone]

## 2023-06-21 NOTE — PROGRESS NOTE ADULT - SUBJECTIVE AND OBJECTIVE BOX
[facility] Division of Hospital Medicine    SUBJECTIVE / OVERNIGHT EVENTS:  No events    Patient denies chest pain, SOB, abd pain, N/V, fever, chills, dysuria or any other complaints. All remainder ROS negative.     MEDICATIONS  (STANDING):  albuterol/ipratropium for Nebulization 3 milliLiter(s) Nebulizer every 6 hours  ALPRAZolam 1 milliGRAM(s) Oral two times a day  azithromycin  IVPB 500 milliGRAM(s) IV Intermittent every 24 hours  cefTRIAXone Injectable. 1000 milliGRAM(s) IV Push every 24 hours  enalapril 2.5 milliGRAM(s) Oral daily  enoxaparin Injectable 40 milliGRAM(s) SubCutaneous every 24 hours  fentaNYL   Patch  25 MICROgram(s)/Hr 1 Patch Transdermal every 72 hours  furosemide   Injectable 40 milliGRAM(s) IV Push once  gabapentin 600 milliGRAM(s) Oral four times a day  levothyroxine 175 MICROGram(s) Oral daily  oxybutynin 10 milliGRAM(s) Oral two times a day    MEDICATIONS  (PRN):  acetaminophen     Tablet .. 650 milliGRAM(s) Oral every 6 hours PRN Temp greater or equal to 38C (100.4F), Mild Pain (1 - 3)  aluminum hydroxide/magnesium hydroxide/simethicone Suspension 30 milliLiter(s) Oral every 4 hours PRN Dyspepsia  melatonin 3 milliGRAM(s) Oral at bedtime PRN Insomnia  ondansetron Injectable 4 milliGRAM(s) IV Push every 8 hours PRN Nausea and/or Vomiting        I&O's Summary      PHYSICAL EXAM:  Vital Signs Last 24 Hrs  T(C): 37 (21 Jun 2023 09:35), Max: 37 (21 Jun 2023 09:35)  T(F): 98.6 (21 Jun 2023 09:35), Max: 98.6 (21 Jun 2023 09:35)  HR: 121 (21 Jun 2023 09:35) (80 - 121)  BP: 134/89 (21 Jun 2023 09:35) (122/67 - 134/89)  BP(mean): --  RR: 18 (21 Jun 2023 09:35) (18 - 18)  SpO2: 98% (21 Jun 2023 09:35) (92% - 98%)    Parameters below as of 21 Jun 2023 09:35  Patient On (Oxygen Delivery Method): nasal cannula            CONSTITUTIONAL: NAD, appears stated age  ENMT: Moist oral mucosa, no pharyngeal injection or exudates; normal dentition  RESPIRATORY: Normal respiratory effort; clear to auscultation bilaterally  CARDIOVASCULAR: Regular rate and rhythm, normal S1 and S2, no murmur/rub/gallop; Peripheral pulses are 2+ bilaterally, + edema b/l le  ABDOMEN: Nontender to palpation, normoactive bowel sounds, no rebound/guarding;   MUSCLOSKELETAL:  No clubbing or cyanosis of digits; no joint swelling or tenderness to palpation  PSYCH: A+O to person, place, and time; affect appropriate  NEUROLOGY: CN 2-12 are intact and symmetric; no gross sensory deficits;   SKIN: No rashes; no palpable lesions    LABS:                        10.3   6.95  )-----------( 223      ( 21 Jun 2023 04:40 )             33.8     06-21    136  |  103  |  9.3  ----------------------------<  101<H>  5.1   |  26.0  |  0.77    Ca    8.9      21 Jun 2023 04:40  Phos  2.7     06-19  Mg     2.2     06-19    TPro  5.7<L>  /  Alb  2.9<L>  /  TBili  <0.2<L>  /  DBili  x   /  AST  18  /  ALT  10  /  AlkPhos  63  06-19    PTT - ( 19 Jun 2023 11:46 )  PTT:28.6 sec  CARDIAC MARKERS ( 19 Jun 2023 14:42 )  x     / <0.01 ng/mL / x     / x     / x      CARDIAC MARKERS ( 19 Jun 2023 11:46 )  x     / <0.01 ng/mL / x     / x     / x          Urinalysis Basic - ( 21 Jun 2023 04:40 )    Color: x / Appearance: x / SG: x / pH: x  Gluc: 101 mg/dL / Ketone: x  / Bili: x / Urobili: x   Blood: x / Protein: x / Nitrite: x   Leuk Esterase: x / RBC: x / WBC x   Sq Epi: x / Non Sq Epi: x / Bacteria: x        Culture - Urine (collected 19 Jun 2023 06:08)  Source: Clean Catch Clean Catch (Midstream)  Final Report (20 Jun 2023 12:05):    <10,000 CFU/mL Normal Urogenital Scarlet    Culture - Blood (collected 19 Jun 2023 02:35)  Source: .Blood Blood-Peripheral  Preliminary Report (20 Jun 2023 07:05):    No growth to date.    Culture - Blood (collected 19 Jun 2023 02:30)  Source: .Blood Blood-Peripheral  Preliminary Report (20 Jun 2023 07:05):    No growth to date.      CAPILLARY BLOOD GLUCOSE            RADIOLOGY & ADDITIONAL TESTS:  Results Reviewed:   Imaging Personally Reviewed:  Electrocardiogram Personally Reviewed:

## 2023-06-22 ENCOUNTER — TRANSCRIPTION ENCOUNTER (OUTPATIENT)
Age: 73
End: 2023-06-22

## 2023-06-22 LAB
ANION GAP SERPL CALC-SCNC: 8 MMOL/L — SIGNIFICANT CHANGE UP (ref 5–17)
BASOPHILS # BLD AUTO: 0.02 K/UL — SIGNIFICANT CHANGE UP (ref 0–0.2)
BASOPHILS NFR BLD AUTO: 0.2 % — SIGNIFICANT CHANGE UP (ref 0–2)
BUN SERPL-MCNC: 13.1 MG/DL — SIGNIFICANT CHANGE UP (ref 8–20)
CALCIUM SERPL-MCNC: 9.5 MG/DL — SIGNIFICANT CHANGE UP (ref 8.4–10.5)
CHLORIDE SERPL-SCNC: 100 MMOL/L — SIGNIFICANT CHANGE UP (ref 96–108)
CO2 SERPL-SCNC: 28 MMOL/L — SIGNIFICANT CHANGE UP (ref 22–29)
CREAT SERPL-MCNC: 0.91 MG/DL — SIGNIFICANT CHANGE UP (ref 0.5–1.3)
EGFR: 88 ML/MIN/1.73M2 — SIGNIFICANT CHANGE UP
EOSINOPHIL # BLD AUTO: 0.15 K/UL — SIGNIFICANT CHANGE UP (ref 0–0.5)
EOSINOPHIL NFR BLD AUTO: 1.8 % — SIGNIFICANT CHANGE UP (ref 0–6)
GLUCOSE SERPL-MCNC: 101 MG/DL — HIGH (ref 70–99)
HCT VFR BLD CALC: 36.7 % — LOW (ref 39–50)
HGB BLD-MCNC: 11.1 G/DL — LOW (ref 13–17)
IMM GRANULOCYTES NFR BLD AUTO: 0.7 % — SIGNIFICANT CHANGE UP (ref 0–0.9)
LYMPHOCYTES # BLD AUTO: 0.64 K/UL — LOW (ref 1–3.3)
LYMPHOCYTES # BLD AUTO: 7.6 % — LOW (ref 13–44)
MAGNESIUM SERPL-MCNC: 2 MG/DL — SIGNIFICANT CHANGE UP (ref 1.8–2.6)
MCHC RBC-ENTMCNC: 25.8 PG — LOW (ref 27–34)
MCHC RBC-ENTMCNC: 30.2 GM/DL — LOW (ref 32–36)
MCV RBC AUTO: 85.3 FL — SIGNIFICANT CHANGE UP (ref 80–100)
MONOCYTES # BLD AUTO: 0.76 K/UL — SIGNIFICANT CHANGE UP (ref 0–0.9)
MONOCYTES NFR BLD AUTO: 9 % — SIGNIFICANT CHANGE UP (ref 2–14)
NEUTROPHILS # BLD AUTO: 6.81 K/UL — SIGNIFICANT CHANGE UP (ref 1.8–7.4)
NEUTROPHILS NFR BLD AUTO: 80.7 % — HIGH (ref 43–77)
PHOSPHATE SERPL-MCNC: 3.4 MG/DL — SIGNIFICANT CHANGE UP (ref 2.4–4.7)
PLATELET # BLD AUTO: 238 K/UL — SIGNIFICANT CHANGE UP (ref 150–400)
POTASSIUM SERPL-MCNC: 4.5 MMOL/L — SIGNIFICANT CHANGE UP (ref 3.5–5.3)
POTASSIUM SERPL-SCNC: 4.5 MMOL/L — SIGNIFICANT CHANGE UP (ref 3.5–5.3)
RBC # BLD: 4.3 M/UL — SIGNIFICANT CHANGE UP (ref 4.2–5.8)
RBC # FLD: 16.1 % — HIGH (ref 10.3–14.5)
SODIUM SERPL-SCNC: 136 MMOL/L — SIGNIFICANT CHANGE UP (ref 135–145)
WBC # BLD: 8.44 K/UL — SIGNIFICANT CHANGE UP (ref 3.8–10.5)
WBC # FLD AUTO: 8.44 K/UL — SIGNIFICANT CHANGE UP (ref 3.8–10.5)

## 2023-06-22 PROCEDURE — 99232 SBSQ HOSP IP/OBS MODERATE 35: CPT

## 2023-06-22 RX ADMIN — Medication 2.5 MILLIGRAM(S): at 04:55

## 2023-06-22 RX ADMIN — GABAPENTIN 600 MILLIGRAM(S): 400 CAPSULE ORAL at 04:56

## 2023-06-22 RX ADMIN — Medication 10 MILLIGRAM(S): at 17:08

## 2023-06-22 RX ADMIN — Medication 3 MILLILITER(S): at 07:31

## 2023-06-22 RX ADMIN — AZITHROMYCIN 255 MILLIGRAM(S): 500 TABLET, FILM COATED ORAL at 02:13

## 2023-06-22 RX ADMIN — Medication 100 MILLIGRAM(S): at 08:19

## 2023-06-22 RX ADMIN — Medication 3 MILLILITER(S): at 20:21

## 2023-06-22 RX ADMIN — Medication 175 MICROGRAM(S): at 04:56

## 2023-06-22 RX ADMIN — Medication 1 MILLIGRAM(S): at 04:55

## 2023-06-22 RX ADMIN — ENOXAPARIN SODIUM 40 MILLIGRAM(S): 100 INJECTION SUBCUTANEOUS at 04:55

## 2023-06-22 RX ADMIN — Medication 3 MILLILITER(S): at 14:01

## 2023-06-22 RX ADMIN — Medication 1 MILLIGRAM(S): at 17:08

## 2023-06-22 RX ADMIN — CEFTRIAXONE 1000 MILLIGRAM(S): 500 INJECTION, POWDER, FOR SOLUTION INTRAMUSCULAR; INTRAVENOUS at 02:13

## 2023-06-22 RX ADMIN — GABAPENTIN 600 MILLIGRAM(S): 400 CAPSULE ORAL at 23:07

## 2023-06-22 RX ADMIN — GABAPENTIN 600 MILLIGRAM(S): 400 CAPSULE ORAL at 17:08

## 2023-06-22 RX ADMIN — Medication 10 MILLIGRAM(S): at 04:56

## 2023-06-22 RX ADMIN — Medication 100 MILLIGRAM(S): at 17:08

## 2023-06-22 NOTE — DISCHARGE NOTE PROVIDER - NSDCFUSCHEDAPPT_GEN_ALL_CORE_FT
Giuseppe Carter  [facility]  PULED [address]  Scheduled Appointment: 07/05/2023

## 2023-06-22 NOTE — DISCHARGE NOTE NURSING/CASE MANAGEMENT/SOCIAL WORK - NSDCPEFALRISK_GEN_ALL_CORE
For information on Fall & Injury Prevention, visit: [email or web address] OR  [email or web address] OR  https://www.cdc.gov/steadi/patient.html

## 2023-06-22 NOTE — DISCHARGE NOTE PROVIDER - CARE PROVIDER_API CALL
Giuseppe Carter  Sleep Medicine  [address] [facility], [address] [address]  Phone: [phone]  Fax: [phone]  Follow Up Time: 1 week    PCP,   Phone: (   )    -  Fax: (   )    -  Follow Up Time:

## 2023-06-22 NOTE — DISCHARGE NOTE PROVIDER - NSDCMRMEDTOKEN_GEN_ALL_CORE_FT
enalapril 2.5 mg oral tablet: 1 tab(s) orally once a day  fentaNYL 25 mcg/hr transdermal film, extended release: 1 patch transdermally every 3 days  gabapentin 600 mg oral tablet: 1 tab(s) orally 4 times a day  levothyroxine 175 mcg (0.175 mg) oral capsule: 1 cap(s) orally once a day  oxyBUTYnin 10 mg/24 hr oral tablet, extended release: 1 tab(s) orally 2 times a day   doxycycline hyclate 100 mg oral capsule: 1 cap(s) orally 2 times a day  enalapril 2.5 mg oral tablet: 1 tab(s) orally once a day  fentaNYL 25 mcg/hr transdermal film, extended release: 1 patch transdermally every 3 days  gabapentin 600 mg oral tablet: 1 tab(s) orally 4 times a day  levothyroxine 175 mcg (0.175 mg) oral capsule: 1 cap(s) orally once a day  metoprolol tartrate 25 mg oral tablet: 1 tab(s) orally 2 times a day  oxyBUTYnin 10 mg/24 hr oral tablet, extended release: 1 tab(s) orally 2 times a day

## 2023-06-22 NOTE — DISCHARGE NOTE NURSING/CASE MANAGEMENT/SOCIAL WORK - PATIENT PORTAL LINK FT
You can access the FollowMyHealth Patient Portal offered by [facility] by registering at the following website: [email or web address]. By joining [email or web address]’s FollowMyHealth portal, you will also be able to view your health information using other applications (apps) compatible with our system.

## 2023-06-22 NOTE — PROGRESS NOTE ADULT - SUBJECTIVE AND OBJECTIVE BOX
[facility] Division of Hospital Medicine      SUBJECTIVE / OVERNIGHT EVENTS:  Sx improving after lasix    Patient denies chest pain, abd pain, N/V, fever, chills, dysuria or any other complaints. All remainder ROS negative.     MEDICATIONS  (STANDING):  albuterol/ipratropium for Nebulization 3 milliLiter(s) Nebulizer every 6 hours  ALPRAZolam 1 milliGRAM(s) Oral two times a day  cefTRIAXone Injectable. 1000 milliGRAM(s) IV Push every 24 hours  doxycycline IVPB 100 milliGRAM(s) IV Intermittent every 12 hours  enalapril 2.5 milliGRAM(s) Oral daily  enoxaparin Injectable 40 milliGRAM(s) SubCutaneous every 24 hours  fentaNYL   Patch  25 MICROgram(s)/Hr 1 Patch Transdermal every 72 hours  gabapentin 600 milliGRAM(s) Oral four times a day  levothyroxine 175 MICROGram(s) Oral daily  oxybutynin 10 milliGRAM(s) Oral two times a day    MEDICATIONS  (PRN):  acetaminophen     Tablet .. 650 milliGRAM(s) Oral every 6 hours PRN Temp greater or equal to 38C (100.4F), Mild Pain (1 - 3)  aluminum hydroxide/magnesium hydroxide/simethicone Suspension 30 milliLiter(s) Oral every 4 hours PRN Dyspepsia  melatonin 3 milliGRAM(s) Oral at bedtime PRN Insomnia  ondansetron Injectable 4 milliGRAM(s) IV Push every 8 hours PRN Nausea and/or Vomiting        I&O's Summary    21 Jun 2023 07:01  -  22 Jun 2023 07:00  --------------------------------------------------------  IN: 0 mL / OUT: 2000 mL / NET: -2000 mL        PHYSICAL EXAM:  Vital Signs Last 24 Hrs  T(C): 37.1 (22 Jun 2023 04:39), Max: 37.3 (21 Jun 2023 16:44)  T(F): 98.7 (22 Jun 2023 04:39), Max: 99.1 (21 Jun 2023 16:44)  HR: 99 (22 Jun 2023 04:39) (89 - 102)  BP: 124/74 (22 Jun 2023 04:39) (122/69 - 124/74)  BP(mean): --  RR: 18 (22 Jun 2023 04:39) (18 - 18)  SpO2: 95% (22 Jun 2023 04:39) (95% - 97%)    Parameters below as of 22 Jun 2023 07:45  Patient On (Oxygen Delivery Method): nasal cannula            CONSTITUTIONAL: NAD, appears stated age  ENMT: Moist oral mucosa, no pharyngeal injection or exudates; normal dentition  RESPIRATORY: Normal respiratory effort; clear to auscultation bilaterally  CARDIOVASCULAR: Regular rate and rhythm, normal S1 and S2, no murmur/rub/gallop; Peripheral pulses are 2+ bilaterally, + edema  ABDOMEN: Nontender to palpation, normoactive bowel sounds, no rebound/guarding;   MUSCLOSKELETAL:  No clubbing or cyanosis of digits; no joint swelling or tenderness to palpation  PSYCH: A+O to person, place, and time; affect appropriate  NEUROLOGY: CN 2-12 are intact and symmetric; no gross sensory deficits;   SKIN: No rashes; no palpable lesions    LABS:                        11.1   8.44  )-----------( 238      ( 22 Jun 2023 04:30 )             36.7     06-22    136  |  100  |  13.1  ----------------------------<  101<H>  4.5   |  28.0  |  0.91    Ca    9.5      22 Jun 2023 04:30  Phos  3.4     06-22  Mg     2.0     06-22            Urinalysis Basic - ( 22 Jun 2023 04:30 )    Color: x / Appearance: x / SG: x / pH: x  Gluc: 101 mg/dL / Ketone: x  / Bili: x / Urobili: x   Blood: x / Protein: x / Nitrite: x   Leuk Esterase: x / RBC: x / WBC x   Sq Epi: x / Non Sq Epi: x / Bacteria: x        CAPILLARY BLOOD GLUCOSE            RADIOLOGY & ADDITIONAL TESTS:  Results Reviewed:   Imaging Personally Reviewed:  Electrocardiogram Personally Reviewed:

## 2023-06-22 NOTE — DISCHARGE NOTE PROVIDER - NSDCCPCAREPLAN_GEN_ALL_CORE_FT
PRINCIPAL DISCHARGE DIAGNOSIS  Diagnosis: Acute respiratory failure with hypoxia  Assessment and Plan of Treatment:       SECONDARY DISCHARGE DIAGNOSES  Diagnosis: PNA (pneumonia)  Assessment and Plan of Treatment:      PRINCIPAL DISCHARGE DIAGNOSIS  Diagnosis: Acute respiratory failure with hypoxia  Assessment and Plan of Treatment: s/p Azithromycin and Rocephin for 5 days  will continue antibiotic  follow up with pulmonology   Home oxygen as needed      SECONDARY DISCHARGE DIAGNOSES  Diagnosis: PNA (pneumonia)  Assessment and Plan of Treatment:      PRINCIPAL DISCHARGE DIAGNOSIS  Diagnosis: Acute respiratory failure with hypoxia  Assessment and Plan of Treatment: s/p Azithromycin and Rocephin for 5 days  will continue antibiotic doxycyclin for 7 more days, make sure take it after food   follow up with pulmonology         SECONDARY DISCHARGE DIAGNOSES  Diagnosis: PNA (pneumonia)  Assessment and Plan of Treatment:

## 2023-06-22 NOTE — DISCHARGE NOTE PROVIDER - PROVIDER TOKENS
PROVIDER:[TOKEN:[[id number]],FOLLOWUP:[1 week]],FREE:[LAST:[PCP],PHONE:[(   )    -],FAX:[(   )    -]]

## 2023-06-22 NOTE — DISCHARGE NOTE PROVIDER - NSDCFUADDAPPT_GEN_ALL_CORE_FT
STAR:  Patient will receive meds to bed from [facility] pharmacy prior to discharge  Patient has a prescheduled Pulmonary appt on ________________________________  Patient received yellow folder.  Patient does not require additional education on his current home medications. STAR:  Patient will receive meds to bed from [facility] pharmacy prior to discharge  Patient has a prescheduled Pulmonary appt on ____7/5/23____________________________  Patient received yellow folder.  Patient does not require additional education on his current home medications.

## 2023-06-22 NOTE — DISCHARGE NOTE NURSING/CASE MANAGEMENT/SOCIAL WORK - NSDCFUADDAPPT_GEN_ALL_CORE_FT
STAR:  Patient will receive meds to bed from [facility] pharmacy prior to discharge  Patient has a prescheduled Pulmonary appt on ________________________________  Patient received yellow folder.  Patient does not require additional education on his current home medications. STAR:  Patient will receive meds to bed from [facility] pharmacy prior to discharge  Patient has a prescheduled Pulmonary appt on ________________________________  Patient received yellow folder.  Patient does not require additional education on his current home medications.  Appointment made with PCP Dr Adonis Iovry , at  [address] [place] [address]  on 7/6/23 at 4:00 PM . If you are unable to attend your pre-scheduled appointment, please contact the office directly at [phone] to reschedule.     STAR pt-needs followup b4 DC    Patient has a prescheduled Pulmonary appt on 7.5.2023 at 10:30am with Dr Carter   [address], [place] [address]  Patient received yellow folder.  Patient does not require additional education on his current home medications, Meds to bed from [facility]  Appointment made with PCP Dr Adonis Ivory , at  [address] [place] [address]  on 7/6/23 at 4:00 PM . If you are unable to attend your pre-scheduled appointment, please contact the office directly at [phone] to reschedule.    [facility] referral      STAR pt-needs followup b4 DC    Patient has a prescheduled Pulmonary appt on 7.5.2023 at 10:30am with Dr Carter   [address], [place] [address]  Patient received yellow folder.  Patient does not require additional education on his current home medications, Meds to bed from [facility]  Appointment made with PCP Dr Adonis Ivory , at  [address] [place] [address]  on 7/6/23 at 4:00 [phone]. If you are unable to attend your pre-scheduled appointment, please contact the office directly at [phone] to reschedule.    [facility] referral      STAR pt-    Patient has a prescheduled Pulmonary appt on 7.5.2023 at 10:30am with Dr Carter   [address], [place] [address]    Patient does not require additional education on his current home medications, Meds to bed from [facility]  Appointment made with PCP Dr Adonis Ivory , at  [address] [place] [address]  on 7/6/23 at 4:00 [phone]. If you are unable to attend your pre-scheduled appointment, please contact the office directly at [phone] to reschedule.  [facility] referral, Yellow STAR folder given to pt  No meds to bed from [facility], no pharmacy review needed

## 2023-06-22 NOTE — DISCHARGE NOTE PROVIDER - ATTENDING DISCHARGE PHYSICAL EXAMINATION:
VITALS:   T(C): 36.5 (06-22-23 @ 11:21), Max: 37.3 (06-21-23 @ 16:44)  HR: 114 (06-22-23 @ 11:21) (89 - 114)  BP: 95/62 (06-22-23 @ 11:21) (95/62 - 124/74)  RR: 18 (06-22-23 @ 11:21) (18 - 18)  SpO2: 96% (06-22-23 @ 11:21) (95% - 97%)    GENERAL: NAD, lying in bed comfortably  HEAD:  Atraumatic, Normocephalic  EYES: EOMI, PERRLA, conjunctiva and sclera clear  ENT: Moist mucous membranes  NECK: Supple, No JVD  CHEST/LUNG: Clear to auscultation bilaterally; No rales, rhonchi, wheezing, or rubs. Unlabored respirations  HEART: Regular rate and rhythm; No murmurs, rubs, or gallops  ABDOMEN: BSx4; Soft, nontender, nondistended  EXTREMITIES:  2+ Peripheral Pulses, brisk capillary refill. No clubbing, cyanosis, or edema  NERVOUS SYSTEM:  A&Ox3, no focal deficits   SKIN: No rashes or lesions  PSYCH: Normal affect, euthymic mood

## 2023-06-23 PROBLEM — I50.9 HEART FAILURE, UNSPECIFIED: Chronic | Status: ACTIVE | Noted: 2023-06-19

## 2023-06-23 PROBLEM — Z82.49 FAMILY HISTORY OF ISCHEMIC HEART DISEASE AND OTHER DISEASES OF THE CIRCULATORY SYSTEM: Chronic | Status: ACTIVE | Noted: 2023-06-19

## 2023-06-23 PROBLEM — J44.9 CHRONIC OBSTRUCTIVE PULMONARY DISEASE, UNSPECIFIED: Chronic | Status: ACTIVE | Noted: 2023-06-19

## 2023-06-23 LAB
ANION GAP SERPL CALC-SCNC: 9 MMOL/L — SIGNIFICANT CHANGE UP (ref 5–17)
BASOPHILS # BLD AUTO: 0.02 K/UL — SIGNIFICANT CHANGE UP (ref 0–0.2)
BASOPHILS NFR BLD AUTO: 0.3 % — SIGNIFICANT CHANGE UP (ref 0–2)
BUN SERPL-MCNC: 14.2 MG/DL — SIGNIFICANT CHANGE UP (ref 8–20)
CALCIUM SERPL-MCNC: 9.1 MG/DL — SIGNIFICANT CHANGE UP (ref 8.4–10.5)
CHLORIDE SERPL-SCNC: 101 MMOL/L — SIGNIFICANT CHANGE UP (ref 96–108)
CO2 SERPL-SCNC: 25 MMOL/L — SIGNIFICANT CHANGE UP (ref 22–29)
CREAT SERPL-MCNC: 0.8 MG/DL — SIGNIFICANT CHANGE UP (ref 0.5–1.3)
EGFR: 92 ML/MIN/1.73M2 — SIGNIFICANT CHANGE UP
EOSINOPHIL # BLD AUTO: 0.15 K/UL — SIGNIFICANT CHANGE UP (ref 0–0.5)
EOSINOPHIL NFR BLD AUTO: 2.1 % — SIGNIFICANT CHANGE UP (ref 0–6)
GLUCOSE SERPL-MCNC: 112 MG/DL — HIGH (ref 70–99)
HCT VFR BLD CALC: 37 % — LOW (ref 39–50)
HGB BLD-MCNC: 11.4 G/DL — LOW (ref 13–17)
IMM GRANULOCYTES NFR BLD AUTO: 1 % — HIGH (ref 0–0.9)
LYMPHOCYTES # BLD AUTO: 0.71 K/UL — LOW (ref 1–3.3)
LYMPHOCYTES # BLD AUTO: 9.8 % — LOW (ref 13–44)
MAGNESIUM SERPL-MCNC: 1.9 MG/DL — SIGNIFICANT CHANGE UP (ref 1.8–2.6)
MCHC RBC-ENTMCNC: 26 PG — LOW (ref 27–34)
MCHC RBC-ENTMCNC: 30.8 GM/DL — LOW (ref 32–36)
MCV RBC AUTO: 84.3 FL — SIGNIFICANT CHANGE UP (ref 80–100)
MONOCYTES # BLD AUTO: 0.49 K/UL — SIGNIFICANT CHANGE UP (ref 0–0.9)
MONOCYTES NFR BLD AUTO: 6.7 % — SIGNIFICANT CHANGE UP (ref 2–14)
NEUTROPHILS # BLD AUTO: 5.82 K/UL — SIGNIFICANT CHANGE UP (ref 1.8–7.4)
NEUTROPHILS NFR BLD AUTO: 80.1 % — HIGH (ref 43–77)
PHOSPHATE SERPL-MCNC: 3.2 MG/DL — SIGNIFICANT CHANGE UP (ref 2.4–4.7)
PLATELET # BLD AUTO: 231 K/UL — SIGNIFICANT CHANGE UP (ref 150–400)
POTASSIUM SERPL-MCNC: 4.2 MMOL/L — SIGNIFICANT CHANGE UP (ref 3.5–5.3)
POTASSIUM SERPL-SCNC: 4.2 MMOL/L — SIGNIFICANT CHANGE UP (ref 3.5–5.3)
RBC # BLD: 4.39 M/UL — SIGNIFICANT CHANGE UP (ref 4.2–5.8)
RBC # FLD: 15.9 % — HIGH (ref 10.3–14.5)
SODIUM SERPL-SCNC: 135 MMOL/L — SIGNIFICANT CHANGE UP (ref 135–145)
WBC # BLD: 7.26 K/UL — SIGNIFICANT CHANGE UP (ref 3.8–10.5)
WBC # FLD AUTO: 7.26 K/UL — SIGNIFICANT CHANGE UP (ref 3.8–10.5)

## 2023-06-23 PROCEDURE — 99232 SBSQ HOSP IP/OBS MODERATE 35: CPT

## 2023-06-23 RX ADMIN — Medication 100 MILLIGRAM(S): at 05:05

## 2023-06-23 RX ADMIN — GABAPENTIN 600 MILLIGRAM(S): 400 CAPSULE ORAL at 17:21

## 2023-06-23 RX ADMIN — Medication 10 MILLIGRAM(S): at 05:04

## 2023-06-23 RX ADMIN — Medication 100 MILLIGRAM(S): at 17:22

## 2023-06-23 RX ADMIN — CEFTRIAXONE 1000 MILLIGRAM(S): 500 INJECTION, POWDER, FOR SOLUTION INTRAMUSCULAR; INTRAVENOUS at 02:00

## 2023-06-23 RX ADMIN — Medication 3 MILLILITER(S): at 07:32

## 2023-06-23 RX ADMIN — Medication 175 MICROGRAM(S): at 05:04

## 2023-06-23 RX ADMIN — ENOXAPARIN SODIUM 40 MILLIGRAM(S): 100 INJECTION SUBCUTANEOUS at 05:04

## 2023-06-23 RX ADMIN — Medication 1 MILLIGRAM(S): at 17:21

## 2023-06-23 RX ADMIN — Medication 2.5 MILLIGRAM(S): at 05:07

## 2023-06-23 RX ADMIN — GABAPENTIN 600 MILLIGRAM(S): 400 CAPSULE ORAL at 23:51

## 2023-06-23 RX ADMIN — GABAPENTIN 600 MILLIGRAM(S): 400 CAPSULE ORAL at 11:18

## 2023-06-23 RX ADMIN — Medication 1 MILLIGRAM(S): at 05:04

## 2023-06-23 RX ADMIN — Medication 10 MILLIGRAM(S): at 17:21

## 2023-06-23 RX ADMIN — Medication 3 MILLILITER(S): at 14:32

## 2023-06-23 RX ADMIN — GABAPENTIN 600 MILLIGRAM(S): 400 CAPSULE ORAL at 05:04

## 2023-06-23 RX ADMIN — Medication 3 MILLILITER(S): at 20:26

## 2023-06-23 NOTE — PROGRESS NOTE ADULT - SUBJECTIVE AND OBJECTIVE BOX
HPI  Pt is a73yo M admitted to [facility] admitted to [facility] for Sepsis secondary to CAP, new LBBB, rabdo secondary to fall an right knee pain.   Pt was seen and examined at bedside with melissar at bedside. No overnight complaints. currently on     Vital Signs Last 24 Hrs  T(C): 36.6 (23 Jun 2023 07:41), Max: 36.9 (23 Jun 2023 04:29)  T(F): 97.8 (23 Jun 2023 07:41), Max: 98.4 (23 Jun 2023 04:29)  HR: 88 (23 Jun 2023 08:32) (88 - 118)  BP: 119/74 (23 Jun 2023 07:41) (95/62 - 119/74)  BP(mean): --  RR: 19 (23 Jun 2023 07:41) (18 - 19)  SpO2: 95% (23 Jun 2023 08:32) (91% - 98%)    Parameters below as of 23 Jun 2023 08:32  Patient On (Oxygen Delivery Method): nasal cannula, 2        I&O's Summary      CAPILLARY BLOOD GLUCOSE          PHYSICAL EXAM:    Constitutional: NAD, awake and alert, well-developed  HEENT: PERR, EOMI, Normal Hearing, MMM  Neck: Soft and supple, No LAD, No JVD  Respiratory: Breath sounds are clear bilaterally, No wheezing, rales or rhonchi  Cardiovascular: S1 and S2, regular rate and rhythm, no Murmurs, gallops or rubs  Gastrointestinal: Bowel Sounds present, soft, nontender, nondistended, no guarding, no rebound  Extremities: No peripheral edema  Vascular: 2+ peripheral pulses  Neurological: A/O x 3, no focal deficits  Musculoskeletal: 5/5 strength b/l upper and lower extremities  Skin: No rashes    MEDICATIONS:  MEDICATIONS  (STANDING):  albuterol/ipratropium for Nebulization 3 milliLiter(s) Nebulizer every 6 hours  ALPRAZolam 1 milliGRAM(s) Oral two times a day  cefTRIAXone Injectable. 1000 milliGRAM(s) IV Push every 24 hours  doxycycline IVPB 100 milliGRAM(s) IV Intermittent every 12 hours  enalapril 2.5 milliGRAM(s) Oral daily  enoxaparin Injectable 40 milliGRAM(s) SubCutaneous every 24 hours  fentaNYL   Patch  25 MICROgram(s)/Hr 1 Patch Transdermal every 72 hours  gabapentin 600 milliGRAM(s) Oral four times a day  levothyroxine 175 MICROGram(s) Oral daily  oxybutynin 10 milliGRAM(s) Oral two times a day      LABS: All Labs Reviewed:                        11.4   7.26  )-----------( 231      ( 23 Jun 2023 06:36 )             37.0     06-23    135  |  101  |  14.2  ----------------------------<  112<H>  4.2   |  25.0  |  0.80    Ca    9.1      23 Jun 2023 06:36  Phos  3.2     06-23  Mg     1.9     06-23            Blood Culture: 06-19 @ 06:08  Organism --  Gram Stain Blood -- Gram Stain --  Specimen Source Clean Catch Clean Catch (Midstream)  Culture-Blood --    06-19 @ 02:35  Organism --  Gram Stain Blood -- Gram Stain --  Specimen Source .Blood Blood-Peripheral  Culture-Blood --    06-19 @ 02:30  Organism --  Gram Stain Blood -- Gram Stain --  Specimen Source .Blood Blood-Peripheral  Culture-Blood --        RADIOLOGY/EKG:    DVT PPX:    ADVANCED DIRECTIVE:    DISPOSITION: HPI  Pt is a73yo M admitted to [facility] admitted to [facility] for Sepsis secondary to CAP, new LBBB, rabdo secondary to fall an right knee pain.   Pt was seen and examined at bedside with melissar at bedside. No overnight complaints. currently on 3L NC    Vital Signs Last 24 Hrs  T(C): 36.6 (23 Jun 2023 07:41), Max: 36.9 (23 Jun 2023 04:29)  T(F): 97.8 (23 Jun 2023 07:41), Max: 98.4 (23 Jun 2023 04:29)  HR: 88 (23 Jun 2023 08:32) (88 - 118)  BP: 119/74 (23 Jun 2023 07:41) (95/62 - 119/74)  BP(mean): --  RR: 19 (23 Jun 2023 07:41) (18 - 19)  SpO2: 95% (23 Jun 2023 08:32) (91% - 98%)    Parameters below as of 23 Jun 2023 08:32  Patient On (Oxygen Delivery Method): nasal cannula, 2        I&O's Summary      CAPILLARY BLOOD GLUCOSE          PHYSICAL EXAM:    Constitutional: NAD, awake and alert,   HEENT: PERR, EOMI, Normal Hearing, MMM  Neck: Soft and supple, No LAD, No JVD  Respiratory: Breath sounds are clear bilaterally, No wheezing, rales or rhonchi  Cardiovascular: S1 and S2,    Gastrointestinal: Bowel Sounds present, soft, nontender,    Extremities: No peripheral edema  Vascular: 2+ peripheral pulses  Neurological: A/O x 3, no focal deficits  Musculoskeletal: 5/5 strength b/l upper and lower extremities  Skin: No rashes    MEDICATIONS:  MEDICATIONS  (STANDING):  albuterol/ipratropium for Nebulization 3 milliLiter(s) Nebulizer every 6 hours  ALPRAZolam 1 milliGRAM(s) Oral two times a day  cefTRIAXone Injectable. 1000 milliGRAM(s) IV Push every 24 hours  doxycycline IVPB 100 milliGRAM(s) IV Intermittent every 12 hours  enalapril 2.5 milliGRAM(s) Oral daily  enoxaparin Injectable 40 milliGRAM(s) SubCutaneous every 24 hours  fentaNYL   Patch  25 MICROgram(s)/Hr 1 Patch Transdermal every 72 hours  gabapentin 600 milliGRAM(s) Oral four times a day  levothyroxine 175 MICROGram(s) Oral daily  oxybutynin 10 milliGRAM(s) Oral two times a day      LABS: All Labs Reviewed:                        11.4   7.26  )-----------( 231      ( 23 Jun 2023 06:36 )             37.0     06-23    135  |  101  |  14.2  ----------------------------<  112<H>  4.2   |  25.0  |  0.80    Ca    9.1      23 Jun 2023 06:36  Phos  3.2     06-23  Mg     1.9     06-23            Blood Culture: 06-19 @ 06:08  Organism --  Gram Stain Blood -- Gram Stain --  Specimen Source Clean Catch Clean Catch (Midstream)  Culture-Blood --    06-19 @ 02:35  Organism --  Gram Stain Blood -- Gram Stain --  Specimen Source .Blood Blood-Peripheral  Culture-Blood --    06-19 @ 02:30  Organism --  Gram Stain Blood -- Gram Stain --  Specimen Source .Blood Blood-Peripheral  Culture-Blood --        RADIOLOGY/EKG:    DVT PPX:    ADVANCED DIRECTIVE:    DISPOSITION:

## 2023-06-23 NOTE — PROGRESS NOTE ADULT - ASSESSMENT
73y Male w/ PMHx of LLE lymphedema, chronic RT knee pain, hypothyroidism, DDD, HFpEF, Hx of SVT, hx of RT pleural effusion, and COPD (not on home O2) presents to [facility] ED for SOB/MAZARIEGOS and Recurrent Falls admitted for Sepsis 2/2 CAP, New LBBB, Rhabdomyolysis, and R knee pain.    Sepsis and Acute Hypoxic Respiratory Failure 2/2 CAP  - Supplemental O2 as required, currently on 3L nasal cannula  - Blood cultures negative x 2  - CT chest: Bilateral groundglass opacities and septal thickening are of   uncertain etiology.  - Continue Azithromycin and Ceftriaxone  Add Lasix given History of CHF, Peripheral edema and septal thickening on CT , still on 3L Oxygen    Chronic LBBB  - EKG w/ Sinus tachycardia 101bpm, LAE, new LBBB     - Prior EKG showed L fascicular blocks  - TTE noted  - Troponin negative  - Cardiolgoy recs appreciated    Rhabdomyolysis   - Likely 2/2 recurrent falls/downtime  - 2L IVFB NSS in ED  Resolved    Frequent Falls, R Knee Pain  - CTH WO no acute changes  - Right knee xray noted  - Fall precautions  - ambulate w/ assistance   - pt ambulates with cane    PT Consult    COPD  - No wheezing to suggest active exacerbation  - Cont Duonebs. Plan as above.    Neuropathy   - Gabapentin 600mg q4hrs -> monitor for sedation  - Fentanyl 25mcg daily transdermal     Hypothyroidism  - Synthroid 175mcg daily    HTN, CAD  - Enalapril 2.5mg daily  - Aspirin 81mg daily    Overactive bladder   - Oxybutynin 10 mg BID     Tobacco/Nicotine Abuse  - Vapes daily.  Cessation.     DVT ppx  - Lovenox SQ    Dispo: Acute  PT consult  
73y Male w/ PMHx of LLE lymphedema, chronic RT knee pain, hypothyroidism, DDD, HFpEF, Hx of SVT, hx of RT pleural effusion, and COPD (not on home O2) presents to [facility] ED for SOB/MAZARIEGOS and Recurrent Falls admitted for Sepsis 2/2 CAP, New LBBB, Rhabdomyolysis, and R knee pain.    Sepsis and Acute Hypoxic Respiratory Failure 2/2 CAP +/- CHF  - Supplemental O2 as required, currently on 3L nasal cannula  - Blood cultures negative x 2  - CT chest: Bilateral groundglass opacities and septal thickening are of   uncertain etiology.  - Continue Azithromycin and Ceftriaxone  Added Lasix on 6/21 given History of CHF, Peripheral edema and septal thickening on CT , still on 3L Oxygen  Cont Diuresis and wean oxygen as tolerated     Chronic LBBB  - EKG w/ Sinus tachycardia 101bpm, LAE, new LBBB     - Prior EKG showed L fascicular blocks  - TTE noted  - Troponin negative  - Cardiolgoy recs appreciated    Rhabdomyolysis   - Likely 2/2 recurrent falls/downtime  - 2L IVFB NSS in ED  Resolved    Frequent Falls, R Knee Pain  - CTH WO no acute changes  - Right knee xray noted  - Fall precautions  - ambulate w/ assistance   - pt ambulates with cane    PT Consulted     COPD  - No wheezing to suggest active exacerbation  - Cont Duonebs. Plan as above.    Neuropathy   - Gabapentin 600mg q4hrs -> monitor for sedation  - Fentanyl 25mcg daily transdermal     Hypothyroidism  - Synthroid 175mcg daily    HTN, CAD  - Enalapril 2.5mg daily  - Aspirin 81mg daily    Overactive bladder   - Oxybutynin 10 mg BID     Tobacco/Nicotine Abuse  - Vapes daily.  Cessation.     DVT ppx  - Lovenox SQ    Dispo: Acute  Possible DC in 1-2 days pending euvolemia and oxygen status   
73y Male w/ PMHx of LLE lymphedema, chronic RT knee pain, hypothyroidism, DDD, HFpEF, Hx of SVT, hx of RT pleural effusion, and COPD (not on home O2) presents to [facility] ED for SOB/MAZARIEGOS and Recurrent Falls admitted for Sepsis 2/2 CAP, New LBBB, Rhabdomyolysis, and R knee pain.    *Sepsis and Acute Hypoxic Respiratory Failure 2/2 CAP +/- CHF  Supplemental O2 currently on 3L nasal cannula  Blood cultures negative x 2  CT chest: Bilateral groundglass opacities and septal thickening are of   uncertain etiology.  Continue Azithromycin and Ceftriaxone  Added Lasix on 6/21 given History of CHF, Peripheral edema and septal thickening on CT , still on 3L Oxygen  Cont Diuresis and wean oxygen as tolerated     *Chronic LBBB  EKG w/ Sinus tachycardia 101bpm, LAE, new LBBB     Prior EKG showed L fascicular blocks  TTE noted  Troponin negative  Cardiolgoy recs appreciated    *Rhabdomyolysis   Likely 2/2 recurrent falls/downtime  2L IVFB NSS in ED  Resolved    *Frequent Falls, R Knee Pain  CTH WO no acute changes  Right knee xray noted  Fall precautions  ambulate w/ assistance   pt ambulates with cane     *COPD  No wheezing to suggest active exacerbation  Cont Duonebs. Plan as above.    *Neuropathy   Gabapentin 600mg q4hrs -> monitor for sedation  Fentanyl 25mcg daily transdermal     *Hypothyroidism  Synthroid 175mcg daily    *HTN, CAD  Enalapril 2.5mg daily  Aspirin 81mg daily    *Overactive bladder   Oxybutynin 10 mg BID     *Tobacco/Nicotine Abuse  Vapes daily.  Cessation.     DVT ppx  Lovenox SQ    Dispo: likely DC in 24hr with Home O2   
73y Male w/ PMHx of LLE lymphedema, chronic RT knee pain, hypothyroidism, DDD, HFpEF, Hx of SVT, hx of RT pleural effusion, and COPD (not on home O2) presents to [facility] ED for SOB/MAZARIEGOS and Recurrent Falls admitted for Sepsis 2/2 CAP, New LBBB, Rhabdomyolysis, and R knee pain.    Sepsis and Acute Hypoxic Respiratory Failure 2/2 CAP  - Supplemental O2 as required, currently on 3L nasal cannula  - Blood cultures negative x 2  - CT chest: Bilateral groundglass opacities and septal thickening are of   uncertain etiology.  - Continue Azithromycin and Ceftriaxone    New LBBB  - EKG w/ Sinus tachycardia 101bpm, LAE, new LBBB     - Prior EKG showed L fascicular blocks  - TTE noted  - Troponin negative  - Cardiolgoy recs appreciated    Rhabdomyolysis   - Likely 2/2 recurrent falls/downtime  - 2L IVFB NSS in ED  - Cont IVF NSS 150cc/hr    Frequent Falls, R Knee Pain  - CTH WO no acute changes  - Right knee xray noted  - Fall precautions  - ambulate w/ assistance   - pt ambulates with cane     COPD  - No wheezing to suggest active exacerbation  - Cont Duonebs. Plan as above.    Neuropathy   - Gabapentin 600mg q4hrs -> monitor for sedation  - Fentanyl 25mcg daily transdermal     Hypothyroidism  - Synthroid 175mcg daily    HTN, CAD  - Enalapril 2.5mg daily  - Aspirin 81mg daily    Overactive bladder   - Oxybutynin 10 mg BID     Tobacco/Nicotine Abuse  - Vapes daily.  Cessation.     DVT ppx  - Lovenox SQ

## 2023-06-23 NOTE — PROGRESS NOTE ADULT - REASON FOR ADMISSION
Rhabdomyolysis, PNA, SIRS

## 2023-06-24 VITALS
RESPIRATION RATE: 16 BRPM | SYSTOLIC BLOOD PRESSURE: 131 MMHG | OXYGEN SATURATION: 95 % | TEMPERATURE: 98 F | DIASTOLIC BLOOD PRESSURE: 84 MMHG | HEART RATE: 88 BPM

## 2023-06-24 LAB
CULTURE RESULTS: SIGNIFICANT CHANGE UP
CULTURE RESULTS: SIGNIFICANT CHANGE UP
SPECIMEN SOURCE: SIGNIFICANT CHANGE UP
SPECIMEN SOURCE: SIGNIFICANT CHANGE UP

## 2023-06-24 PROCEDURE — 71250 CT THORAX DX C-: CPT

## 2023-06-24 PROCEDURE — 73564 X-RAY EXAM KNEE 4 OR MORE: CPT

## 2023-06-24 PROCEDURE — 83735 ASSAY OF MAGNESIUM: CPT

## 2023-06-24 PROCEDURE — 94640 AIRWAY INHALATION TREATMENT: CPT

## 2023-06-24 PROCEDURE — 84100 ASSAY OF PHOSPHORUS: CPT

## 2023-06-24 PROCEDURE — 82550 ASSAY OF CK (CPK): CPT

## 2023-06-24 PROCEDURE — 84145 PROCALCITONIN (PCT): CPT

## 2023-06-24 PROCEDURE — 84295 ASSAY OF SERUM SODIUM: CPT

## 2023-06-24 PROCEDURE — 87449 NOS EACH ORGANISM AG IA: CPT

## 2023-06-24 PROCEDURE — 99239 HOSP IP/OBS DSCHRG MGMT >30: CPT

## 2023-06-24 PROCEDURE — 81001 URINALYSIS AUTO W/SCOPE: CPT

## 2023-06-24 PROCEDURE — 80048 BASIC METABOLIC PNL TOTAL CA: CPT

## 2023-06-24 PROCEDURE — 82553 CREATINE MB FRACTION: CPT

## 2023-06-24 PROCEDURE — 84484 ASSAY OF TROPONIN QUANT: CPT

## 2023-06-24 PROCEDURE — 87086 URINE CULTURE/COLONY COUNT: CPT

## 2023-06-24 PROCEDURE — 87641 MR-STAPH DNA AMP PROBE: CPT

## 2023-06-24 PROCEDURE — 80053 COMPREHEN METABOLIC PANEL: CPT

## 2023-06-24 PROCEDURE — 87040 BLOOD CULTURE FOR BACTERIA: CPT

## 2023-06-24 PROCEDURE — 86803 HEPATITIS C AB TEST: CPT

## 2023-06-24 PROCEDURE — 36415 COLL VENOUS BLD VENIPUNCTURE: CPT

## 2023-06-24 PROCEDURE — 96375 TX/PRO/DX INJ NEW DRUG ADDON: CPT

## 2023-06-24 PROCEDURE — 85610 PROTHROMBIN TIME: CPT

## 2023-06-24 PROCEDURE — 87640 STAPH A DNA AMP PROBE: CPT

## 2023-06-24 PROCEDURE — 93005 ELECTROCARDIOGRAM TRACING: CPT

## 2023-06-24 PROCEDURE — 85025 COMPLETE CBC W/AUTO DIFF WBC: CPT

## 2023-06-24 PROCEDURE — 70450 CT HEAD/BRAIN W/O DYE: CPT | Mod: MA

## 2023-06-24 PROCEDURE — 99285 EMERGENCY DEPT VISIT HI MDM: CPT | Mod: 25

## 2023-06-24 PROCEDURE — 82947 ASSAY GLUCOSE BLOOD QUANT: CPT

## 2023-06-24 PROCEDURE — 82803 BLOOD GASES ANY COMBINATION: CPT

## 2023-06-24 PROCEDURE — 85730 THROMBOPLASTIN TIME PARTIAL: CPT

## 2023-06-24 PROCEDURE — 82330 ASSAY OF CALCIUM: CPT

## 2023-06-24 PROCEDURE — 96365 THER/PROPH/DIAG IV INF INIT: CPT

## 2023-06-24 PROCEDURE — 94760 N-INVAS EAR/PLS OXIMETRY 1: CPT

## 2023-06-24 PROCEDURE — C8929: CPT

## 2023-06-24 PROCEDURE — 83605 ASSAY OF LACTIC ACID: CPT

## 2023-06-24 PROCEDURE — 83880 ASSAY OF NATRIURETIC PEPTIDE: CPT

## 2023-06-24 PROCEDURE — 82435 ASSAY OF BLOOD CHLORIDE: CPT

## 2023-06-24 PROCEDURE — 71045 X-RAY EXAM CHEST 1 VIEW: CPT

## 2023-06-24 PROCEDURE — 84132 ASSAY OF SERUM POTASSIUM: CPT

## 2023-06-24 PROCEDURE — 85014 HEMATOCRIT: CPT

## 2023-06-24 PROCEDURE — 85018 HEMOGLOBIN: CPT

## 2023-06-24 PROCEDURE — 0225U NFCT DS DNA&RNA 21 SARSCOV2: CPT

## 2023-06-24 PROCEDURE — 87899 AGENT NOS ASSAY W/OPTIC: CPT

## 2023-06-24 RX ORDER — METOPROLOL TARTRATE 50 MG
1 TABLET ORAL
Qty: 60 | Refills: 0
Start: 2023-06-24 | End: 2023-07-23

## 2023-06-24 RX ORDER — METOPROLOL TARTRATE 50 MG
25 TABLET ORAL
Refills: 0 | Status: DISCONTINUED | OUTPATIENT
Start: 2023-06-24 | End: 2023-06-24

## 2023-06-24 RX ADMIN — GABAPENTIN 600 MILLIGRAM(S): 400 CAPSULE ORAL at 06:10

## 2023-06-24 RX ADMIN — ENOXAPARIN SODIUM 40 MILLIGRAM(S): 100 INJECTION SUBCUTANEOUS at 06:10

## 2023-06-24 RX ADMIN — Medication 3 MILLILITER(S): at 03:18

## 2023-06-24 RX ADMIN — CEFTRIAXONE 1000 MILLIGRAM(S): 500 INJECTION, POWDER, FOR SOLUTION INTRAMUSCULAR; INTRAVENOUS at 03:23

## 2023-06-24 RX ADMIN — Medication 25 MILLIGRAM(S): at 09:11

## 2023-06-24 RX ADMIN — Medication 100 MILLIGRAM(S): at 06:10

## 2023-06-24 RX ADMIN — Medication 1 MILLIGRAM(S): at 06:10

## 2023-06-24 RX ADMIN — Medication 175 MICROGRAM(S): at 06:10

## 2023-06-24 RX ADMIN — GABAPENTIN 600 MILLIGRAM(S): 400 CAPSULE ORAL at 13:35

## 2023-06-24 RX ADMIN — Medication 3 MILLILITER(S): at 08:17

## 2023-06-24 RX ADMIN — Medication 10 MILLIGRAM(S): at 06:10

## 2023-06-24 RX ADMIN — Medication 2.5 MILLIGRAM(S): at 06:10

## 2023-06-25 ENCOUNTER — TRANSCRIPTION ENCOUNTER (OUTPATIENT)
Age: 73
End: 2023-06-25

## 2023-06-27 ENCOUNTER — APPOINTMENT (OUTPATIENT)
Dept: CARE COORDINATION | Facility: HOME HEALTH | Age: 73
End: 2023-06-27
Payer: MEDICARE

## 2023-06-27 ENCOUNTER — TRANSCRIPTION ENCOUNTER (OUTPATIENT)
Age: 73
End: 2023-06-27

## 2023-06-27 VITALS
TEMPERATURE: 98.1 F | HEART RATE: 95 BPM | DIASTOLIC BLOOD PRESSURE: 60 MMHG | WEIGHT: 231 LBS | SYSTOLIC BLOOD PRESSURE: 100 MMHG | OXYGEN SATURATION: 98 % | RESPIRATION RATE: 18 BRPM | HEIGHT: 69.5 IN | BODY MASS INDEX: 33.45 KG/M2

## 2023-06-27 DIAGNOSIS — I50.30 UNSPECIFIED DIASTOLIC (CONGESTIVE) HEART FAILURE: ICD-10-CM

## 2023-06-27 DIAGNOSIS — I10 ESSENTIAL (PRIMARY) HYPERTENSION: ICD-10-CM

## 2023-06-27 DIAGNOSIS — N32.81 OVERACTIVE BLADDER: ICD-10-CM

## 2023-06-27 DIAGNOSIS — R60.0 LOCALIZED EDEMA: ICD-10-CM

## 2023-06-27 DIAGNOSIS — G62.9 POLYNEUROPATHY, UNSPECIFIED: ICD-10-CM

## 2023-06-27 DIAGNOSIS — E03.9 HYPOTHYROIDISM, UNSPECIFIED: ICD-10-CM

## 2023-06-27 DIAGNOSIS — G89.29 OTHER CHRONIC PAIN: ICD-10-CM

## 2023-06-27 PROCEDURE — 99496 TRANSJ CARE MGMT HIGH F2F 7D: CPT

## 2023-06-27 RX ORDER — LEVOTHYROXINE SODIUM 0.17 MG/1
175 TABLET ORAL
Qty: 90 | Refills: 3 | Status: ACTIVE | COMMUNITY
Start: 2017-05-23

## 2023-06-27 RX ORDER — METOPROLOL TARTRATE 25 MG/1
25 TABLET, FILM COATED ORAL
Qty: 60 | Refills: 3 | Status: ACTIVE | COMMUNITY
Start: 2023-06-27

## 2023-06-27 RX ORDER — FUROSEMIDE 20 MG/1
20 TABLET ORAL
Qty: 30 | Refills: 2 | Status: ACTIVE | COMMUNITY
Start: 2023-06-27 | End: 1900-01-01

## 2023-06-27 RX ORDER — GABAPENTIN 600 MG/1
600 TABLET, COATED ORAL 4 TIMES DAILY
Refills: 0 | Status: ACTIVE | COMMUNITY
Start: 2023-06-27

## 2023-06-27 RX ORDER — OXYBUTYNIN CHLORIDE 10 MG/1
10 TABLET, EXTENDED RELEASE ORAL DAILY
Qty: 90 | Refills: 3 | Status: ACTIVE | COMMUNITY
Start: 2023-06-27

## 2023-06-27 RX ORDER — DOXYCYCLINE HYCLATE 100 MG/1
100 TABLET ORAL
Qty: 14 | Refills: 0 | Status: ACTIVE | COMMUNITY
Start: 2023-06-27

## 2023-06-27 RX ORDER — ENALAPRIL MALEATE 2.5 MG/1
2.5 TABLET ORAL DAILY
Refills: 0 | Status: ACTIVE | COMMUNITY
Start: 2023-06-27

## 2023-06-28 PROBLEM — N32.81 OVERACTIVE BLADDER: Status: ACTIVE | Noted: 2023-06-27

## 2023-06-28 PROBLEM — I10 ESSENTIAL HYPERTENSION: Status: ACTIVE | Noted: 2023-06-27

## 2023-06-28 NOTE — ASSESSMENT
[FreeTextEntry1] : [] CAP/ COPD\par - stable on exam. Diminished lung sounds RLL\par - continue and complete doxycycline\par - [employer] TCM teaching: Advised patient to adhere to all medications including demonstrating proper use of inhalers and nebulizers. Encourage the use of proper breathing techniques such as pursed lip breathing or leaning forward during exhalation. Patient understands proper use of oxygen therapy. Increase activity as tolerated and maintain optimal activity levels. Continue coughing and deep breathing exercises including use of Incentive Spirometry. Receive routine pneumococcal and influenza vaccinations. Identify early signs and sx of disease and notify NP/MD. Maintain proper nutrition and hydration. Follow up with Pulmonologist within 7 days of discharge. Contact information given, patient advised to call with any questions/concerns.\par \par [] HFpEF/ peng LE edema\par - volume overload 2+ pitting edema t peng LE. \par - START furosemide 20mg QD\par - continue enalapril and Lopressor\par - low sodium dieet with 48oz fluid restriction\par - daily weight and BP monitor\par - Follow up with cardiologist\par \par [] chronic pain/peripheral neuropathy\par - continue fentanyl patch, gabapentin and Methadone\par - Follow up with pain management as scheduled.\par - safety precaution reviewed \par \par \par Reviewed all medications at length with patient, All questions addressed. Worsening symptoms discussed with pt understanding. No issues or concerns at this time. Pt encouraged to call CCC/CN at [phone] w/any questions, concerns or issues. Will continue to follow up with patient status\par \par

## 2023-06-28 NOTE — COUNSELING
[Fall prevention counseling provided] : Fall prevention counseling provided [Adequate lighting] : Adequate lighting [Use proper foot wear] : Use proper foot wear [No throw rugs] : No throw rugs [Use recommended devices] : Use recommended devices [de-identified] : 1. CHF\par    - low sodium diet\par    - 1-1.2L fluid restriction (including soup, tea, juice and waster)\par    - Daily weight monitor; if wt gain >1-2lb in 2-3 days, > 3-5lbs in a week, please call CCC/CN \par    - Daily BP monitor, if SBP<100, do not give BP meds, wait 1 hrs to recheck BP, if SBP<100 again, call CCC/CN\par \par 2. Pulmonary health\par    - Deep breathing exercise in sitting up tall or standing position, 10 times hourly\par    - Complete oral antibiotics as ordered, take probiotics during antibiotics therapy.\par    - Continue using nebulizers and inhalers Q4hrs as ordered.\par \par  [None] : None [Good understanding] : Patient has a good understanding of lifestyle changes and steps needed to achieve self management goal

## 2023-06-28 NOTE — HISTORY OF PRESENT ILLNESS
[Admitted on: ___] : The patient was admitted on [unfilled] [Discharged on ___] : discharged on [unfilled] [FreeTextEntry2] : Copied from [employer]\par 'Hospital Course	\par 73y Male w/ PMHx of LLE lymphedema, chronic RT knee pain, hypothyroidism, DDD, HFpEF, Hx of SVT, hx of RT pleural effusion, and COPD (not on home O2) presents to [facility] ED for SOB/MAZARIEGOS and Recurrent Falls admitted for Sepsis 2/2 CAP, New LBBB, Rhabdomyolysis, and R knee pain.\par \par Sepsis and Acute Hypoxic Respiratory Failure 2/2 CAP +/- CHF\par - Blood cultures negative x 2\par - CT chest: Bilateral groundglass opacities and septal thickening are of \par uncertain etiology.\par Received Abx and IV Lasix given signs of hypervolemia. tte with hfpef\par Oxygen was weaned and now stable for discharge with course of oral abx.\par \par Frequent Falls, R Knee Pain\par - CTH WO no acute changes\par - Right knee xray noted\par - Fall precautions\par - ambulate w/ assistance \par - pt ambulates with cane \par \par Patient  being seen at home for post discharge follow-up. AOx3, denies CP, dizziness, endorsing MAZARIEGOS. Discharge instructions reviewed, Medication reconciled. appetite and sleep WNL, denies any changes in bladder or bowel function. Patient presents with 2+ pitting edema to peng LEs. Patient ambulates with cane and walker. gait instability imposes high risk for fall. Patient lives alone. no issues with getting food or meds. \par  \par Cardio Saravi, [place]\par \par

## 2023-06-28 NOTE — PHYSICAL EXAM
[No Acute Distress] : no acute distress [Well Nourished] : well nourished [Well Developed] : well developed [Well-Appearing] : well-appearing [Normal Voice/Communication] : normal voice/communication [Normal Sclera/Conjunctiva] : normal sclera/conjunctiva [EOMI] : extraocular movements intact [Normal Outer Ear/Nose] : the outer ears and nose were normal in appearance [Normal Oropharynx] : the oropharynx was normal [No JVD] : no jugular venous distention [Supple] : supple [Thyroid Normal, No Nodules] : the thyroid was normal and there were no nodules present [No Respiratory Distress] : no respiratory distress  [No Accessory Muscle Use] : no accessory muscle use [Normal Rate] : normal rate  [Regular Rhythm] : with a regular rhythm [Normal S1, S2] : normal S1 and S2 [No Murmur] : no murmur heard [No Varicosities] : no varicosities [Pedal Pulses Present] : the pedal pulses are present [No Extremity Clubbing/Cyanosis] : no extremity clubbing/cyanosis [Soft] : abdomen soft [Non Tender] : non-tender [Non-distended] : non-distended [Normal Posterior Cervical Nodes] : no posterior cervical lymphadenopathy [Normal Bowel Sounds] : normal bowel sounds [Normal Anterior Cervical Nodes] : no anterior cervical lymphadenopathy [No CVA Tenderness] : no CVA  tenderness [No Spinal Tenderness] : no spinal tenderness [No Joint Swelling] : no joint swelling [Grossly Normal Strength/Tone] : grossly normal strength/tone [No Rash] : no rash [Coordination Grossly Intact] : coordination grossly intact [Deep Tendon Reflexes (DTR)] : deep tendon reflexes were 2+ and symmetric [No Focal Deficits] : no focal deficits [Speech Grossly Normal] : speech grossly normal [Memory Grossly Normal] : memory grossly normal [Normal Affect] : the affect was normal [Alert and Oriented x3] : oriented to person, place, and time [Normal Mood] : the mood was normal [Normal Insight/Judgement] : insight and judgment were intact [de-identified] : morbidly obese cooperative elderly male.  [de-identified] : Diminished lung sounds on RLL [de-identified] : 2+ pitting edema peng feet and ankles

## 2023-06-28 NOTE — REVIEW OF SYSTEMS
[Fatigue] : fatigue [Chest Pain] : no chest pain [Palpitations] : no palpitations [Claudication] : no  leg claudication [Lower Ext Edema] : lower extremity edema [Shortness Of Breath] : no shortness of breath [Wheezing] : no wheezing [Cough] : no cough [Dyspnea on Exertion] : dyspnea on exertion [Abdominal Pain] : no abdominal pain [Nausea] : no nausea [Constipation] : no constipation [Diarrhea] : no diarrhea [Vomiting] : no vomiting [Heartburn] : no heartburn [Melena] : no melena [Dysuria] : no dysuria [Incontinence] : no incontinence [Hematuria] : no hematuria [Frequency] : no frequency [Joint Pain] : joint pain [Muscle Pain] : muscle pain [Back Pain] : back pain [Headache] : no headache [Dizziness] : no dizziness [Fainting] : no fainting [Confusion] : no confusion [Unsteady Walk] : ataxia [Memory Loss] : no memory loss [Insomnia] : no insomnia [Anxiety] : no anxiety [Depression] : no depression [Negative] : Heme/Lymph

## 2023-07-05 ENCOUNTER — APPOINTMENT (OUTPATIENT)
Dept: PULMONOLOGY | Facility: CLINIC | Age: 73
End: 2023-07-05
Payer: MEDICARE

## 2023-07-05 VITALS
HEART RATE: 89 BPM | HEIGHT: 69.5 IN | OXYGEN SATURATION: 90 % | BODY MASS INDEX: 33.3 KG/M2 | DIASTOLIC BLOOD PRESSURE: 50 MMHG | RESPIRATION RATE: 18 BRPM | SYSTOLIC BLOOD PRESSURE: 90 MMHG | WEIGHT: 230 LBS

## 2023-07-05 DIAGNOSIS — J44.9 CHRONIC OBSTRUCTIVE PULMONARY DISEASE, UNSPECIFIED: ICD-10-CM

## 2023-07-05 DIAGNOSIS — J84.115 RESPIRATORY BRONCHIOLITIS INTERSTITIAL LUNG DISEASE: ICD-10-CM

## 2023-07-05 DIAGNOSIS — J18.9 PNEUMONIA, UNSPECIFIED ORGANISM: ICD-10-CM

## 2023-07-05 DIAGNOSIS — Z87.01 PERSONAL HISTORY OF PNEUMONIA (RECURRENT): ICD-10-CM

## 2023-07-05 PROCEDURE — 99214 OFFICE O/P EST MOD 30 MIN: CPT

## 2023-07-05 NOTE — DISCUSSION/SUMMARY
[FreeTextEntry1] : Respiratory Bronchiolitis ILD improving post DC cigarettes\par Post resolved Community acquired pneumonia\par Denies GERD \par SINA with neuropathy and EDS more than dyspnea - feels sleep is great on Melatonin HS\par Obesity and deconditioning
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
## 2023-07-14 ENCOUNTER — TRANSCRIPTION ENCOUNTER (OUTPATIENT)
Age: 73
End: 2023-07-14

## 2023-07-19 ENCOUNTER — TRANSCRIPTION ENCOUNTER (OUTPATIENT)
Age: 73
End: 2023-07-19

## 2023-07-25 ENCOUNTER — TRANSCRIPTION ENCOUNTER (OUTPATIENT)
Age: 73
End: 2023-07-25

## 2023-08-21 NOTE — ED ADULT NURSE NOTE - AS SC BRADEN MOISTURE
Electrodesiccation And Curettage Text: The wound bed was treated with electrodesiccation and curettage x 3 after the biopsy was performed. (4) rarely moist

## 2023-09-13 ENCOUNTER — INPATIENT (INPATIENT)
Facility: HOSPITAL | Age: 73
LOS: 8 days | Discharge: NURSING, REHAB OR LONG-TERM CARE | DRG: 871 | End: 2023-09-22
Attending: HOSPITALIST | Admitting: INTERNAL MEDICINE
Payer: MEDICARE

## 2023-09-13 VITALS
RESPIRATION RATE: 18 BRPM | WEIGHT: 207.01 LBS | SYSTOLIC BLOOD PRESSURE: 67 MMHG | TEMPERATURE: 98 F | OXYGEN SATURATION: 87 % | HEIGHT: 69 IN | HEART RATE: 84 BPM | DIASTOLIC BLOOD PRESSURE: 36 MMHG

## 2023-09-13 DIAGNOSIS — A41.9 SEPSIS, UNSPECIFIED ORGANISM: ICD-10-CM

## 2023-09-13 DIAGNOSIS — Z98.890 OTHER SPECIFIED POSTPROCEDURAL STATES: Chronic | ICD-10-CM

## 2023-09-13 LAB
ALBUMIN SERPL ELPH-MCNC: 3.2 G/DL — LOW (ref 3.3–5.2)
ALBUMIN SERPL ELPH-MCNC: 3.3 G/DL — SIGNIFICANT CHANGE UP (ref 3.3–5.2)
ALP SERPL-CCNC: 67 U/L — SIGNIFICANT CHANGE UP (ref 40–120)
ALP SERPL-CCNC: 70 U/L — SIGNIFICANT CHANGE UP (ref 40–120)
ALT FLD-CCNC: 6 U/L — SIGNIFICANT CHANGE UP
ALT FLD-CCNC: 7 U/L — SIGNIFICANT CHANGE UP
ANION GAP SERPL CALC-SCNC: 10 MMOL/L — SIGNIFICANT CHANGE UP (ref 5–17)
ANION GAP SERPL CALC-SCNC: 13 MMOL/L — SIGNIFICANT CHANGE UP (ref 5–17)
APPEARANCE UR: CLEAR — SIGNIFICANT CHANGE UP
APTT BLD: 29.1 SEC — SIGNIFICANT CHANGE UP (ref 24.5–35.6)
AST SERPL-CCNC: 10 U/L — SIGNIFICANT CHANGE UP
AST SERPL-CCNC: 9 U/L — SIGNIFICANT CHANGE UP
BACTERIA # UR AUTO: ABNORMAL
BASE EXCESS BLDV CALC-SCNC: -6.7 MMOL/L — LOW (ref -2–3)
BASOPHILS # BLD AUTO: 0.04 K/UL — SIGNIFICANT CHANGE UP (ref 0–0.2)
BASOPHILS NFR BLD AUTO: 0.3 % — SIGNIFICANT CHANGE UP (ref 0–2)
BILIRUB SERPL-MCNC: 0.5 MG/DL — SIGNIFICANT CHANGE UP (ref 0.4–2)
BILIRUB SERPL-MCNC: 0.6 MG/DL — SIGNIFICANT CHANGE UP (ref 0.4–2)
BILIRUB UR-MCNC: ABNORMAL
BUN SERPL-MCNC: 26.7 MG/DL — HIGH (ref 8–20)
BUN SERPL-MCNC: 28.5 MG/DL — HIGH (ref 8–20)
CA-I SERPL-SCNC: 1.22 MMOL/L — SIGNIFICANT CHANGE UP (ref 1.15–1.33)
CALCIUM SERPL-MCNC: 8.3 MG/DL — LOW (ref 8.4–10.5)
CALCIUM SERPL-MCNC: 9 MG/DL — SIGNIFICANT CHANGE UP (ref 8.4–10.5)
CHLORIDE BLDV-SCNC: 104 MMOL/L — SIGNIFICANT CHANGE UP (ref 96–108)
CHLORIDE SERPL-SCNC: 100 MMOL/L — SIGNIFICANT CHANGE UP (ref 96–108)
CHLORIDE SERPL-SCNC: 106 MMOL/L — SIGNIFICANT CHANGE UP (ref 96–108)
CO2 SERPL-SCNC: 21 MMOL/L — LOW (ref 22–29)
CO2 SERPL-SCNC: 21 MMOL/L — LOW (ref 22–29)
COLOR SPEC: YELLOW — SIGNIFICANT CHANGE UP
CREAT SERPL-MCNC: 2.14 MG/DL — HIGH (ref 0.5–1.3)
CREAT SERPL-MCNC: 2.62 MG/DL — HIGH (ref 0.5–1.3)
DIFF PNL FLD: NEGATIVE — SIGNIFICANT CHANGE UP
EGFR: 25 ML/MIN/1.73M2 — LOW
EGFR: 32 ML/MIN/1.73M2 — LOW
EOSINOPHIL # BLD AUTO: 0.06 K/UL — SIGNIFICANT CHANGE UP (ref 0–0.5)
EOSINOPHIL NFR BLD AUTO: 0.4 % — SIGNIFICANT CHANGE UP (ref 0–6)
EPI CELLS # UR: SIGNIFICANT CHANGE UP
GAS PNL BLDV: 130 MMOL/L — LOW (ref 136–145)
GAS PNL BLDV: SIGNIFICANT CHANGE UP
GLUCOSE BLDV-MCNC: 106 MG/DL — HIGH (ref 70–99)
GLUCOSE SERPL-MCNC: 111 MG/DL — HIGH (ref 70–99)
GLUCOSE SERPL-MCNC: 113 MG/DL — HIGH (ref 70–99)
GLUCOSE UR QL: NEGATIVE MG/DL — SIGNIFICANT CHANGE UP
HCO3 BLDV-SCNC: 20 MMOL/L — LOW (ref 22–29)
HCT VFR BLD CALC: 36 % — LOW (ref 39–50)
HCT VFR BLD CALC: 37.2 % — LOW (ref 39–50)
HCT VFR BLDA CALC: 36 % — SIGNIFICANT CHANGE UP
HGB BLD CALC-MCNC: 12 G/DL — LOW (ref 12.6–17.4)
HGB BLD-MCNC: 11.1 G/DL — LOW (ref 13–17)
HGB BLD-MCNC: 11.2 G/DL — LOW (ref 13–17)
IMM GRANULOCYTES NFR BLD AUTO: 0.5 % — SIGNIFICANT CHANGE UP (ref 0–0.9)
INR BLD: 1.06 RATIO — SIGNIFICANT CHANGE UP (ref 0.85–1.18)
KETONES UR-MCNC: ABNORMAL
LACTATE BLDV-MCNC: 1.1 MMOL/L — SIGNIFICANT CHANGE UP (ref 0.5–2)
LACTATE BLDV-MCNC: 1.7 MMOL/L — SIGNIFICANT CHANGE UP (ref 0.5–2)
LEUKOCYTE ESTERASE UR-ACNC: ABNORMAL
LYMPHOCYTES # BLD AUTO: 0.96 K/UL — LOW (ref 1–3.3)
LYMPHOCYTES # BLD AUTO: 6.5 % — LOW (ref 13–44)
MCHC RBC-ENTMCNC: 27 PG — SIGNIFICANT CHANGE UP (ref 27–34)
MCHC RBC-ENTMCNC: 27.7 PG — SIGNIFICANT CHANGE UP (ref 27–34)
MCHC RBC-ENTMCNC: 29.8 GM/DL — LOW (ref 32–36)
MCHC RBC-ENTMCNC: 31.1 GM/DL — LOW (ref 32–36)
MCV RBC AUTO: 89.1 FL — SIGNIFICANT CHANGE UP (ref 80–100)
MCV RBC AUTO: 90.5 FL — SIGNIFICANT CHANGE UP (ref 80–100)
MONOCYTES # BLD AUTO: 1.04 K/UL — HIGH (ref 0–0.9)
MONOCYTES NFR BLD AUTO: 7.1 % — SIGNIFICANT CHANGE UP (ref 2–14)
NEUTROPHILS # BLD AUTO: 12.53 K/UL — HIGH (ref 1.8–7.4)
NEUTROPHILS NFR BLD AUTO: 85.2 % — HIGH (ref 43–77)
NITRITE UR-MCNC: NEGATIVE — SIGNIFICANT CHANGE UP
PCO2 BLDV: 44 MMHG — SIGNIFICANT CHANGE UP (ref 42–55)
PH BLDV: 7.27 — LOW (ref 7.32–7.43)
PH UR: 5 — SIGNIFICANT CHANGE UP (ref 5–8)
PLATELET # BLD AUTO: 211 K/UL — SIGNIFICANT CHANGE UP (ref 150–400)
PLATELET # BLD AUTO: 228 K/UL — SIGNIFICANT CHANGE UP (ref 150–400)
PO2 BLDV: 83 MMHG — HIGH (ref 25–45)
POTASSIUM BLDV-SCNC: 4.6 MMOL/L — SIGNIFICANT CHANGE UP (ref 3.5–5.1)
POTASSIUM SERPL-MCNC: 4.6 MMOL/L — SIGNIFICANT CHANGE UP (ref 3.5–5.3)
POTASSIUM SERPL-MCNC: 4.7 MMOL/L — SIGNIFICANT CHANGE UP (ref 3.5–5.3)
POTASSIUM SERPL-SCNC: 4.6 MMOL/L — SIGNIFICANT CHANGE UP (ref 3.5–5.3)
POTASSIUM SERPL-SCNC: 4.7 MMOL/L — SIGNIFICANT CHANGE UP (ref 3.5–5.3)
PROT SERPL-MCNC: 5.7 G/DL — LOW (ref 6.6–8.7)
PROT SERPL-MCNC: 6.2 G/DL — LOW (ref 6.6–8.7)
PROT UR-MCNC: NEGATIVE — SIGNIFICANT CHANGE UP
PROTHROM AB SERPL-ACNC: 11.7 SEC — SIGNIFICANT CHANGE UP (ref 9.5–13)
RAPID RVP RESULT: SIGNIFICANT CHANGE UP
RBC # BLD: 4.04 M/UL — LOW (ref 4.2–5.8)
RBC # BLD: 4.11 M/UL — LOW (ref 4.2–5.8)
RBC # FLD: 15.9 % — HIGH (ref 10.3–14.5)
RBC # FLD: 15.9 % — HIGH (ref 10.3–14.5)
RBC CASTS # UR COMP ASSIST: SIGNIFICANT CHANGE UP /HPF (ref 0–4)
SAO2 % BLDV: 96.2 % — SIGNIFICANT CHANGE UP
SARS-COV-2 RNA SPEC QL NAA+PROBE: SIGNIFICANT CHANGE UP
SODIUM SERPL-SCNC: 134 MMOL/L — LOW (ref 135–145)
SODIUM SERPL-SCNC: 136 MMOL/L — SIGNIFICANT CHANGE UP (ref 135–145)
SP GR SPEC: 1.02 — SIGNIFICANT CHANGE UP (ref 1.01–1.02)
TROPONIN T SERPL-MCNC: 0.02 NG/ML — SIGNIFICANT CHANGE UP (ref 0–0.06)
UROBILINOGEN FLD QL: 1 MG/DL
WBC # BLD: 12.82 K/UL — HIGH (ref 3.8–10.5)
WBC # BLD: 14.71 K/UL — HIGH (ref 3.8–10.5)
WBC # FLD AUTO: 12.82 K/UL — HIGH (ref 3.8–10.5)
WBC # FLD AUTO: 14.71 K/UL — HIGH (ref 3.8–10.5)
WBC UR QL: ABNORMAL /HPF (ref 0–5)

## 2023-09-13 PROCEDURE — 99291 CRITICAL CARE FIRST HOUR: CPT

## 2023-09-13 PROCEDURE — 74176 CT ABD & PELVIS W/O CONTRAST: CPT | Mod: 26,MA

## 2023-09-13 PROCEDURE — 70450 CT HEAD/BRAIN W/O DYE: CPT | Mod: 26,MA

## 2023-09-13 PROCEDURE — 93010 ELECTROCARDIOGRAM REPORT: CPT

## 2023-09-13 PROCEDURE — 71250 CT THORAX DX C-: CPT | Mod: 26,MA

## 2023-09-13 PROCEDURE — 72125 CT NECK SPINE W/O DYE: CPT | Mod: 26,MA

## 2023-09-13 PROCEDURE — 71045 X-RAY EXAM CHEST 1 VIEW: CPT | Mod: 26

## 2023-09-13 RX ORDER — VANCOMYCIN HCL 1 G
VIAL (EA) INTRAVENOUS
Refills: 0 | Status: DISCONTINUED | OUTPATIENT
Start: 2023-09-13 | End: 2023-09-13

## 2023-09-13 RX ORDER — PIPERACILLIN AND TAZOBACTAM 4; .5 G/20ML; G/20ML
3.38 INJECTION, POWDER, LYOPHILIZED, FOR SOLUTION INTRAVENOUS ONCE
Refills: 0 | Status: COMPLETED | OUTPATIENT
Start: 2023-09-13 | End: 2023-09-13

## 2023-09-13 RX ORDER — ACETAMINOPHEN 500 MG
650 TABLET ORAL EVERY 6 HOURS
Refills: 0 | Status: DISCONTINUED | OUTPATIENT
Start: 2023-09-13 | End: 2023-09-22

## 2023-09-13 RX ORDER — SODIUM CHLORIDE 9 MG/ML
1000 INJECTION INTRAMUSCULAR; INTRAVENOUS; SUBCUTANEOUS ONCE
Refills: 0 | Status: COMPLETED | OUTPATIENT
Start: 2023-09-13 | End: 2023-09-13

## 2023-09-13 RX ORDER — SODIUM CHLORIDE 9 MG/ML
2200 INJECTION INTRAMUSCULAR; INTRAVENOUS; SUBCUTANEOUS ONCE
Refills: 0 | Status: COMPLETED | OUTPATIENT
Start: 2023-09-13 | End: 2023-09-13

## 2023-09-13 RX ORDER — AZITHROMYCIN 500 MG/1
500 TABLET, FILM COATED ORAL ONCE
Refills: 0 | Status: COMPLETED | OUTPATIENT
Start: 2023-09-13 | End: 2023-09-13

## 2023-09-13 RX ORDER — AZITHROMYCIN 500 MG/1
TABLET, FILM COATED ORAL
Refills: 0 | Status: DISCONTINUED | OUTPATIENT
Start: 2023-09-13 | End: 2023-09-18

## 2023-09-13 RX ORDER — PIPERACILLIN AND TAZOBACTAM 4; .5 G/20ML; G/20ML
3.38 INJECTION, POWDER, LYOPHILIZED, FOR SOLUTION INTRAVENOUS EVERY 8 HOURS
Refills: 0 | Status: COMPLETED | OUTPATIENT
Start: 2023-09-13 | End: 2023-09-20

## 2023-09-13 RX ORDER — CHLORHEXIDINE GLUCONATE 213 G/1000ML
1 SOLUTION TOPICAL
Refills: 0 | Status: DISCONTINUED | OUTPATIENT
Start: 2023-09-13 | End: 2023-09-22

## 2023-09-13 RX ORDER — VANCOMYCIN HCL 1 G
1250 VIAL (EA) INTRAVENOUS ONCE
Refills: 0 | Status: COMPLETED | OUTPATIENT
Start: 2023-09-13 | End: 2023-09-13

## 2023-09-13 RX ORDER — LEVOTHYROXINE SODIUM 125 MCG
175 TABLET ORAL DAILY
Refills: 0 | Status: DISCONTINUED | OUTPATIENT
Start: 2023-09-13 | End: 2023-09-19

## 2023-09-13 RX ORDER — ASPIRIN/CALCIUM CARB/MAGNESIUM 324 MG
81 TABLET ORAL DAILY
Refills: 0 | Status: DISCONTINUED | OUTPATIENT
Start: 2023-09-13 | End: 2023-09-22

## 2023-09-13 RX ORDER — HEPARIN SODIUM 5000 [USP'U]/ML
5000 INJECTION INTRAVENOUS; SUBCUTANEOUS EVERY 8 HOURS
Refills: 0 | Status: DISCONTINUED | OUTPATIENT
Start: 2023-09-13 | End: 2023-09-22

## 2023-09-13 RX ORDER — AZITHROMYCIN 500 MG/1
500 TABLET, FILM COATED ORAL EVERY 24 HOURS
Refills: 0 | Status: DISCONTINUED | OUTPATIENT
Start: 2023-09-14 | End: 2023-09-18

## 2023-09-13 RX ORDER — ALBUTEROL 90 UG/1
2 AEROSOL, METERED ORAL EVERY 6 HOURS
Refills: 0 | Status: DISCONTINUED | OUTPATIENT
Start: 2023-09-13 | End: 2023-09-22

## 2023-09-13 RX ORDER — MIDODRINE HYDROCHLORIDE 2.5 MG/1
10 TABLET ORAL EVERY 8 HOURS
Refills: 0 | Status: DISCONTINUED | OUTPATIENT
Start: 2023-09-13 | End: 2023-09-17

## 2023-09-13 RX ORDER — NOREPINEPHRINE BITARTRATE/D5W 8 MG/250ML
0.05 PLASTIC BAG, INJECTION (ML) INTRAVENOUS
Qty: 8 | Refills: 0 | Status: DISCONTINUED | OUTPATIENT
Start: 2023-09-13 | End: 2023-09-14

## 2023-09-13 RX ADMIN — MIDODRINE HYDROCHLORIDE 10 MILLIGRAM(S): 2.5 TABLET ORAL at 22:15

## 2023-09-13 RX ADMIN — SODIUM CHLORIDE 1000 MILLILITER(S): 9 INJECTION INTRAMUSCULAR; INTRAVENOUS; SUBCUTANEOUS at 19:00

## 2023-09-13 RX ADMIN — Medication 650 MILLIGRAM(S): at 23:19

## 2023-09-13 RX ADMIN — PIPERACILLIN AND TAZOBACTAM 200 GRAM(S): 4; .5 INJECTION, POWDER, LYOPHILIZED, FOR SOLUTION INTRAVENOUS at 17:42

## 2023-09-13 RX ADMIN — SODIUM CHLORIDE 2200 MILLILITER(S): 9 INJECTION INTRAMUSCULAR; INTRAVENOUS; SUBCUTANEOUS at 17:27

## 2023-09-13 RX ADMIN — Medication 8.72 MICROGRAM(S)/KG/MIN: at 19:34

## 2023-09-13 RX ADMIN — SODIUM CHLORIDE 2200 MILLILITER(S): 9 INJECTION INTRAMUSCULAR; INTRAVENOUS; SUBCUTANEOUS at 16:27

## 2023-09-13 RX ADMIN — AZITHROMYCIN 255 MILLIGRAM(S): 500 TABLET, FILM COATED ORAL at 22:27

## 2023-09-13 RX ADMIN — SODIUM CHLORIDE 1000 MILLILITER(S): 9 INJECTION INTRAMUSCULAR; INTRAVENOUS; SUBCUTANEOUS at 17:29

## 2023-09-13 NOTE — ED ADULT NURSE NOTE - NSFALLRISKINTERV_ED_ALL_ED
Assistance OOB with selected safe patient handling equipment if applicable/Assistance with ambulation/Communicate fall risk and risk factors to all staff, patient, and family/Orthostatic vital signs/Provide visual cue: yellow wristband, yellow gown, etc/Reinforce activity limits and safety measures with patient and family/Call bell, personal items and telephone in reach/Instruct patient to call for assistance before getting out of bed/chair/stretcher/Non-slip footwear applied when patient is off stretcher/[place] to call system/Physically safe environment - no spills, clutter or unnecessary equipment/Purposeful Proactive Rounding/Room/bathroom lighting operational, light cord in reach

## 2023-09-13 NOTE — H&P ADULT - HISTORY OF PRESENT ILLNESS
74 y/o M with a h/o  74 y/o M with a h/o LLE lymphedema, hypothyroidism, HFpEF, SVT, LBBB, right pleural effusion, COPD, DDD, recent admission in June 2023 for CAP, presents to the ED after exhibiting altered mentation earlier today in his apartment. His niece reported that he was difficult to arouse in the afternoon and later on in the day was found to be wandering around his apartment confused. He was found to be hypoxemic and hypotensive despite 4L crystalloid and was started on IV vasopressor therapy. CT chest reveals worsening multifocal ground glass opacities, septal thickening, and pleural-based nodule in the right lung apex. The patient does not report any current symptoms at this time and does not state that he felt out of the ordinary today, however he is a poor historian.

## 2023-09-13 NOTE — H&P ADULT - ASSESSMENT
72 y/o M with a h/o LLE lymphedema, hypothyroidism, HFpEF, SVT, LBBB, right pleural effusion, COPD, DDD, recent admission in June 2023 for CAP, with:    # Septic shock  # Multifocal pneumonia  # Acute hypoxemic respiratory failure  # Pleural based pulmonary nodule, RUL  # VI    Admit to MICU.    - s/p 4L crystalloid bolus, start norepinephrine infusion, titrate to maintain a MAP > 65  - add midodrine 10mg TID to help offload infusion requirement  - blood cultures pending, send sputum culture if able to obtain a sample  - check urine legionella and strep pneumoniae antigens, serum procalcitonin, MRSA nasal PCR  - start empiric course of Zosyn, azithromycin, vancomycin x 1 dose in setting of renal failure  - VI likely pre-renal with ischemic ATN component, optimize end-organ perfusion as above  - trend BUN/Cr, electrolytes, acid-base balance, and monitor hourly UOP  - no indication for urgent RRT at this time  - conventional nasal O2 to maintain SpO2 > 92%  - RUL pleural based nodule appears similar to CT chest from June 2023, will need outpatient pulmonary follow up  - CT brain unremarkable for acute pathology, mentation has improved with hemodynamics  - start levothyroxine, check TSH      Case discussed with the patient at the bedside. Diagnosis, prognosis, and management plan outlined. All questions answered and concerns addressed.    Case discussed with MICU physician, Dr. Ross.        CRITICAL CARE TIME SPENT: 47 mins  Time spent evaluating/treating patient with medical issues that pose a high risk for life threatening deterioration and/or end-organ damage, reviewing data/labs/imaging, discussing case with multidisciplinary team, discussing plan/goals of care with patient/family. Non-inclusive of procedure time.   none

## 2023-09-13 NOTE — ED PROVIDER NOTE - PROGRESS NOTE DETAILS
Daksha Reynolds, DO: BP not improved after 4L fluids, levophed started. Patient still mentating well, appears tired, but remains conversant and AAOx3.  Whitehead placed with return of dark ailyn urine. Pending CTs at this time. Daksha Reynolds, DO: BP not improved after 4L fluids, levophed started. Patient still mentating well, appears tired, but remains conversant and AAOx3.  Whitehead placed with return of dark ailyn urine.  Patient notes has been taking GOLO supplement for weight loss, which includes magnesium, zinc, chromium - possible cause for VI.     Pending CTs at this time. Evan MARTIN- Patient was found to have worsening right lung apical nodule.  Patient has unintentional weight loss of 120 pounds over 1 month.  Patient was accepted by MICU for persistent hypotension and acute renal failure.
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
## 2023-09-13 NOTE — H&P ADULT - CRITICAL CARE ATTENDING COMMENT
Pt seen w PA and agree w above.  Pt w ? somnolence at home, was not in his usual state, reportedly wandering around his apartment.  He was brought to the ed and found to be hypotensive and hypoxemic.  Pt was started on NC and administered 4L of fluid. Remained hypotensive and norepinephrine was started.  Abx administered.  CT chest shows changes in the r lung ? pneumonia  and ?pleural based nodule which will require further out patient w/u.   As per pt he was hospitalized for pneumonia 8/1/23.  Continue broad spectrum abx, taper off norepinephrine to maintain map >65. Monitor O2 sat and taper off NC to maintain O2 sat >92%.

## 2023-09-13 NOTE — ED PROVIDER NOTE - PHYSICAL EXAMINATION
Gen: well appearing, no acute distress  Head: normocephalic, atraumatic  EENT: EOMI, b/l pupils 2mm equal and reactive, dry mucous membranes  Lung: no increased work of breathing, clear to auscultation bilaterally, no wheezing,  speaking in full sentences  CV: regular rate, regular rhythm, 2+ radial pulses bilaterally  Abd: soft, non-tender, non-distended  MSK: No edema, no visible deformities, full range of motion in all 4 extremities  Neuro: Awake, alert, no focal neurologic deficits  Skin: No obvious rash, no jaundice

## 2023-09-13 NOTE — ED PROVIDER NOTE - NS ED ROS FT
Gen: No fever, no change in activity level  ENT: No congestion, no rhinorrhea  Resp: No cough, no trouble breathing  Cardiovascular: No chest pain, no palpitation  Gastrointestinal: No nausea, no vomiting, no diarrhea  MS: No joint or muscle pain  Skin: No rashes  Neuro: No headache; no abnormal movements  Remainder negative, except as per the HPI

## 2023-09-13 NOTE — ED ADULT NURSE REASSESSMENT NOTE - NS ED NURSE REASSESS COMMENT FT1
Patient with persistent hypotension despite fluid resuscitation. notified dr. combs. 1 additional liter initiated, will start pressors if no improvement. patient with normal mental status. sinus on cardiac monitor. awaiting ct scan.

## 2023-09-13 NOTE — ED ADULT TRIAGE NOTE - CHIEF COMPLAINT QUOTE
Pt BIBA from home, wife called 911 saying pt was unresponsive, pt AOx3 upon ED arrival, has defibrillator in place, c/o back pain, hypotensive upon ED arrival and O2 88% on room air

## 2023-09-13 NOTE — ED ADULT NURSE REASSESSMENT NOTE - NS ED NURSE REASSESS COMMENT FT1
Assumed patient care at 1930, patient is a&ox4, respirations are even and unlabored on 3L NC, cardiac monitoring and  in place. Pt states he was 250lbs then dropped to 135lbs and went back up to 207lbs in 4 months. Pt also states he felt fine until today, he feels weak. pt started on MD Gail patel at bedside.

## 2023-09-13 NOTE — ED ADULT NURSE NOTE - OBJECTIVE STATEMENT
patient with period of unresponsiveness at home in chair. patient arrives lethargic but arousable to voice and oriented x4. patient's blood pressure in 60s systolic, two peripheral IVs established and normal saline initiated. patient with period of unresponsiveness at home in chair. patient arrives lethargic but arousable to voice and oriented x4. patient's blood pressure in 60s systolic, two peripheral IVs established and normal saline initiated. c/o chronic lower back pain.

## 2023-09-14 LAB
ANION GAP SERPL CALC-SCNC: 10 MMOL/L — SIGNIFICANT CHANGE UP (ref 5–17)
BUN SERPL-MCNC: 23.6 MG/DL — HIGH (ref 8–20)
CALCIUM SERPL-MCNC: 8.3 MG/DL — LOW (ref 8.4–10.5)
CHLORIDE SERPL-SCNC: 105 MMOL/L — SIGNIFICANT CHANGE UP (ref 96–108)
CO2 SERPL-SCNC: 19 MMOL/L — LOW (ref 22–29)
CREAT SERPL-MCNC: 1.6 MG/DL — HIGH (ref 0.5–1.3)
CULTURE RESULTS: NO GROWTH — SIGNIFICANT CHANGE UP
EGFR: 45 ML/MIN/1.73M2 — LOW
GLUCOSE SERPL-MCNC: 120 MG/DL — HIGH (ref 70–99)
HCT VFR BLD CALC: 33.9 % — LOW (ref 39–50)
HGB BLD-MCNC: 10.5 G/DL — LOW (ref 13–17)
MAGNESIUM SERPL-MCNC: 2 MG/DL — SIGNIFICANT CHANGE UP (ref 1.6–2.6)
MCHC RBC-ENTMCNC: 27.3 PG — SIGNIFICANT CHANGE UP (ref 27–34)
MCHC RBC-ENTMCNC: 31 GM/DL — LOW (ref 32–36)
MCV RBC AUTO: 88.3 FL — SIGNIFICANT CHANGE UP (ref 80–100)
MRSA PCR RESULT.: SIGNIFICANT CHANGE UP
PHOSPHATE SERPL-MCNC: 3.5 MG/DL — SIGNIFICANT CHANGE UP (ref 2.4–4.7)
PLATELET # BLD AUTO: 194 K/UL — SIGNIFICANT CHANGE UP (ref 150–400)
POTASSIUM SERPL-MCNC: 4.6 MMOL/L — SIGNIFICANT CHANGE UP (ref 3.5–5.3)
POTASSIUM SERPL-SCNC: 4.6 MMOL/L — SIGNIFICANT CHANGE UP (ref 3.5–5.3)
PROCALCITONIN SERPL-MCNC: 0.15 NG/ML — HIGH (ref 0.02–0.1)
RBC # BLD: 3.84 M/UL — LOW (ref 4.2–5.8)
RBC # FLD: 15.9 % — HIGH (ref 10.3–14.5)
S AUREUS DNA NOSE QL NAA+PROBE: SIGNIFICANT CHANGE UP
SODIUM SERPL-SCNC: 134 MMOL/L — LOW (ref 135–145)
SPECIMEN SOURCE: SIGNIFICANT CHANGE UP
TSH SERPL-MCNC: <0.1 UIU/ML — LOW (ref 0.27–4.2)
WBC # BLD: 12.11 K/UL — HIGH (ref 3.8–10.5)
WBC # FLD AUTO: 12.11 K/UL — HIGH (ref 3.8–10.5)

## 2023-09-14 PROCEDURE — 93010 ELECTROCARDIOGRAM REPORT: CPT

## 2023-09-14 PROCEDURE — 99291 CRITICAL CARE FIRST HOUR: CPT

## 2023-09-14 PROCEDURE — 76775 US EXAM ABDO BACK WALL LIM: CPT | Mod: 26

## 2023-09-14 PROCEDURE — 78582 LUNG VENTILAT&PERFUS IMAGING: CPT | Mod: 26

## 2023-09-14 PROCEDURE — 93970 EXTREMITY STUDY: CPT | Mod: 26

## 2023-09-14 RX ORDER — OXYBUTYNIN CHLORIDE 5 MG
1 TABLET ORAL
Refills: 0 | DISCHARGE

## 2023-09-14 RX ORDER — FENTANYL CITRATE 50 UG/ML
1 INJECTION INTRAVENOUS
Refills: 0 | DISCHARGE

## 2023-09-14 RX ORDER — ALPRAZOLAM 0.25 MG
1 TABLET ORAL EVERY 12 HOURS
Refills: 0 | Status: DISCONTINUED | OUTPATIENT
Start: 2023-09-14 | End: 2023-09-19

## 2023-09-14 RX ORDER — ALPRAZOLAM 0.25 MG
1 TABLET ORAL
Refills: 0 | DISCHARGE

## 2023-09-14 RX ORDER — METHADONE HYDROCHLORIDE 40 MG/1
10 TABLET ORAL THREE TIMES A DAY
Refills: 0 | Status: DISCONTINUED | OUTPATIENT
Start: 2023-09-14 | End: 2023-09-16

## 2023-09-14 RX ORDER — HYDROCORTISONE 20 MG
50 TABLET ORAL EVERY 6 HOURS
Refills: 0 | Status: DISCONTINUED | OUTPATIENT
Start: 2023-09-14 | End: 2023-09-14

## 2023-09-14 RX ORDER — ASPIRIN/CALCIUM CARB/MAGNESIUM 324 MG
1 TABLET ORAL
Refills: 0 | DISCHARGE

## 2023-09-14 RX ADMIN — PIPERACILLIN AND TAZOBACTAM 25 GRAM(S): 4; .5 INJECTION, POWDER, LYOPHILIZED, FOR SOLUTION INTRAVENOUS at 17:33

## 2023-09-14 RX ADMIN — PIPERACILLIN AND TAZOBACTAM 25 GRAM(S): 4; .5 INJECTION, POWDER, LYOPHILIZED, FOR SOLUTION INTRAVENOUS at 09:24

## 2023-09-14 RX ADMIN — Medication 175 MICROGRAM(S): at 05:19

## 2023-09-14 RX ADMIN — METHADONE HYDROCHLORIDE 10 MILLIGRAM(S): 40 TABLET ORAL at 13:23

## 2023-09-14 RX ADMIN — Medication 81 MILLIGRAM(S): at 12:06

## 2023-09-14 RX ADMIN — Medication 1 MILLIGRAM(S): at 12:06

## 2023-09-14 RX ADMIN — CHLORHEXIDINE GLUCONATE 1 APPLICATION(S): 213 SOLUTION TOPICAL at 04:26

## 2023-09-14 RX ADMIN — PIPERACILLIN AND TAZOBACTAM 25 GRAM(S): 4; .5 INJECTION, POWDER, LYOPHILIZED, FOR SOLUTION INTRAVENOUS at 02:30

## 2023-09-14 RX ADMIN — Medication 8.72 MICROGRAM(S)/KG/MIN: at 08:22

## 2023-09-14 RX ADMIN — HEPARIN SODIUM 5000 UNIT(S): 5000 INJECTION INTRAVENOUS; SUBCUTANEOUS at 05:19

## 2023-09-14 RX ADMIN — MIDODRINE HYDROCHLORIDE 10 MILLIGRAM(S): 2.5 TABLET ORAL at 05:19

## 2023-09-14 RX ADMIN — HEPARIN SODIUM 5000 UNIT(S): 5000 INJECTION INTRAVENOUS; SUBCUTANEOUS at 13:23

## 2023-09-14 RX ADMIN — AZITHROMYCIN 255 MILLIGRAM(S): 500 TABLET, FILM COATED ORAL at 22:00

## 2023-09-14 RX ADMIN — Medication 650 MILLIGRAM(S): at 00:47

## 2023-09-14 RX ADMIN — MIDODRINE HYDROCHLORIDE 10 MILLIGRAM(S): 2.5 TABLET ORAL at 21:35

## 2023-09-14 RX ADMIN — METHADONE HYDROCHLORIDE 10 MILLIGRAM(S): 40 TABLET ORAL at 21:35

## 2023-09-14 RX ADMIN — HEPARIN SODIUM 5000 UNIT(S): 5000 INJECTION INTRAVENOUS; SUBCUTANEOUS at 21:35

## 2023-09-14 RX ADMIN — Medication 166.67 MILLIGRAM(S): at 00:21

## 2023-09-14 RX ADMIN — MIDODRINE HYDROCHLORIDE 10 MILLIGRAM(S): 2.5 TABLET ORAL at 13:23
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
## 2023-09-14 NOTE — PROGRESS NOTE ADULT - ASSESSMENT
BLAS SUAZO is a 73M with PMHx of LLE lymphedema, hypothyroidism, HFpEF, SVT, LBBB, right pleural effusion, COPD, DDD, and a recent admission in June 2023 for CAP, presents to the ED after exhibiting altered mentation. His niece reported that he was difficult to arouse and later on in the day was found to be wandering around his apartment confused. On presentation to the ED he was found to be hypoxemic and hypotensive despite 4L crystalloid and was started on IV vasopressor therapy. CT chest revealed worsening multifocal ground glass opacities, septal thickening, and a pleural-based nodule in the right lung apex.    PLAN:  Acute hypoxemic respiratory failure  Septic shock  Multifocal pneumonia  Pleural based pulmonary nodule, RUL  VI    Neuro: No active issues at this time, neuro checks q4h  Pulm: O2 saturation >97% on 3L nasal cannula, will attempt to wean to room air; VQ scan and doppler studies ordered to rule out PE; albuterol treatments ordered; pulmonary nodule on CT chest, pt. will follow up outpatient; pulmonary toileting encouraged, incentive spirometry education provided  CV: NSR with LBBB on EKG; remains on levo gtt at this time, titrated as per orders to maintain goal MAP 65-70, midodrine started; pending repeat echo  GI: DASH/TLC diet ordered, tolerating PO intake  Renal: Whitehead discontinued, pending TTV; will monitor renal function on labs  ID: Afebrile with mild leukocytosis, on azithromycin and zosyn; RVP-, other cultures pending; PRN tylenol ordered for fever  Heme: H/H stable; ASA and heparin ordered  Endo: On levothyroxine for hypothyroidism    Case discussed with MICU Attending: Dr. Darrick Romeo, NP Student  [facility] BLAS SUAZO is a 73M with PMHx of LLE lymphedema, hypothyroidism, HFpEF, SVT, LBBB, right pleural effusion, COPD, DDD, and a recent admission in June 2023 for CAP, presents to the ED after exhibiting altered mentation. His niece reported that he was difficult to arouse and later on in the day was found to be wandering around his apartment confused. On presentation to the ED he was found to be hypoxemic and hypotensive despite 4L crystalloid and was started on IV vasopressor therapy. CT chest revealed worsening multifocal ground glass opacities, septal thickening, and a pleural-based nodule in the right lung apex.    PLAN:  Acute hypoxemic respiratory failure  Septic shock  Multifocal pneumonia  Pleural based pulmonary nodule, RUL  VI  T11 compression fracture    Neuro: No active issues at this time, neuro checks q4h; T11 compression fracture seen on CT scan, pt. followed outpatient for treatment already, neurosurgery consulted and signed off  Pulm: O2 saturation >97% on 3L nasal cannula, will attempt to wean to room air; VQ scan and doppler studies ordered to rule out PE; albuterol treatments ordered; pulmonary nodule on CT chest, pt. will follow up outpatient; pulmonary toileting encouraged, incentive spirometry education provided  CV: NSR with LBBB on EKG; remains on levo gtt at this time, titrated as per orders to maintain goal MAP 65-70, midodrine started and starting hydrocortisone; pending repeat echo  GI: DASH/TLC diet ordered, tolerating PO intake  Renal: Whitehead discontinued, pending TTV; will monitor renal function on labs  ID: Afebrile with mild leukocytosis, on azithromycin and zosyn; RVP-, other cultures pending; PRN tylenol ordered for fever  Heme: H/H stable; ASA and heparin ordered  Endo: On levothyroxine for hypothyroidism    Case discussed with MICU Attending: Dr. Darrick Romeo, NP Student  [facility]

## 2023-09-14 NOTE — PROGRESS NOTE ADULT - ATTENDING COMMENTS
3M with PMHx of LLE lymphedema, hypothyroidism, HFpEF, SVT, LBBB, right pleural effusion, COPD, DDD, and a recent admission in June 2023 for CAP, Admitted to MICU for septic shock 2/2 to pneumonia needing pressor.  Patient was started on broad spectrum antibiotic.  MIdodrine also started to help weaning off pressor, and ct scan was obtained to better visualize the pneumonia. Blood culture and urine culture still pending, patient mental status is much better per family.  And pressor was weaned off this afternoon.    Given of questionable history of syncope, will do PE workup.  RLE doppler + for thrombosis at peroneal vein, below the knee level.  VQ scan obtained, very low probabily for PE.  ECHO obtained, no right heart strain.  At this time, patient has no RLE pain, will repeat ultrasound in 1-2 weeks, if persist, or develops pain, will start AC.   Appreciate NS consult for T11 fracture, outpatient follow and continue using brace.    If remains off pressor, may consider for downgrade. 3M with PMHx of LLE lymphedema, hypothyroidism, HFpEF, SVT, LBBB, right pleural effusion, COPD, DDD, and a recent admission in June 2023 for CAP, Admitted to MICU for septic shock 2/2 to pneumonia needing pressor.  Patient was started on broad spectrum antibiotic.  MIdodrine also started to help weaning off pressor, and ct scan was obtained to better visualize the pneumonia. Blood culture and urine culture still pending, patient mental status is much better per family.  And pressor was weaned off this afternoon.    Given of questionable history of syncope, will do PE workup.  RLE doppler + for thrombosis at peroneal vein, below the knee level.  VQ scan obtained, very low probabily for PE.  ECHO obtained, no right heart strain.  At this time, patient has no RLE pain, will repeat ultrasound in 1-2 weeks, if persist, or develops pain, will start AC.   Appreciate NS consult for T11 fracture, outpatient follow and continue using brace.    pulmnary nodule: will need repeat ct chest in 6 weeks, and likely outpatient to follow up.   If remains off pressor, may consider for downgrade.

## 2023-09-15 LAB
ALBUMIN SERPL ELPH-MCNC: 2.8 G/DL — LOW (ref 3.3–5.2)
ALP SERPL-CCNC: 60 U/L — SIGNIFICANT CHANGE UP (ref 40–120)
ALT FLD-CCNC: 5 U/L — SIGNIFICANT CHANGE UP
ANION GAP SERPL CALC-SCNC: 8 MMOL/L — SIGNIFICANT CHANGE UP (ref 5–17)
AST SERPL-CCNC: 7 U/L — SIGNIFICANT CHANGE UP
BASOPHILS # BLD AUTO: 0.01 K/UL — SIGNIFICANT CHANGE UP (ref 0–0.2)
BASOPHILS NFR BLD AUTO: 0.1 % — SIGNIFICANT CHANGE UP (ref 0–2)
BILIRUB SERPL-MCNC: 0.4 MG/DL — SIGNIFICANT CHANGE UP (ref 0.4–2)
BUN SERPL-MCNC: 14.6 MG/DL — SIGNIFICANT CHANGE UP (ref 8–20)
CALCIUM SERPL-MCNC: 9.3 MG/DL — SIGNIFICANT CHANGE UP (ref 8.4–10.5)
CHLORIDE SERPL-SCNC: 105 MMOL/L — SIGNIFICANT CHANGE UP (ref 96–108)
CO2 SERPL-SCNC: 23 MMOL/L — SIGNIFICANT CHANGE UP (ref 22–29)
CREAT SERPL-MCNC: 1.05 MG/DL — SIGNIFICANT CHANGE UP (ref 0.5–1.3)
EGFR: 74 ML/MIN/1.73M2 — SIGNIFICANT CHANGE UP
EOSINOPHIL # BLD AUTO: 0.17 K/UL — SIGNIFICANT CHANGE UP (ref 0–0.5)
EOSINOPHIL NFR BLD AUTO: 2.2 % — SIGNIFICANT CHANGE UP (ref 0–6)
GLUCOSE SERPL-MCNC: 97 MG/DL — SIGNIFICANT CHANGE UP (ref 70–99)
HCT VFR BLD CALC: 34.1 % — LOW (ref 39–50)
HGB BLD-MCNC: 10.7 G/DL — LOW (ref 13–17)
IMM GRANULOCYTES NFR BLD AUTO: 0.9 % — SIGNIFICANT CHANGE UP (ref 0–0.9)
LYMPHOCYTES # BLD AUTO: 0.69 K/UL — LOW (ref 1–3.3)
LYMPHOCYTES # BLD AUTO: 8.8 % — LOW (ref 13–44)
MAGNESIUM SERPL-MCNC: 2 MG/DL — SIGNIFICANT CHANGE UP (ref 1.6–2.6)
MCHC RBC-ENTMCNC: 27.4 PG — SIGNIFICANT CHANGE UP (ref 27–34)
MCHC RBC-ENTMCNC: 31.4 GM/DL — LOW (ref 32–36)
MCV RBC AUTO: 87.4 FL — SIGNIFICANT CHANGE UP (ref 80–100)
MONOCYTES # BLD AUTO: 0.54 K/UL — SIGNIFICANT CHANGE UP (ref 0–0.9)
MONOCYTES NFR BLD AUTO: 6.9 % — SIGNIFICANT CHANGE UP (ref 2–14)
NEUTROPHILS # BLD AUTO: 6.34 K/UL — SIGNIFICANT CHANGE UP (ref 1.8–7.4)
NEUTROPHILS NFR BLD AUTO: 81.1 % — HIGH (ref 43–77)
PHOSPHATE SERPL-MCNC: 1.8 MG/DL — LOW (ref 2.4–4.7)
PLATELET # BLD AUTO: 172 K/UL — SIGNIFICANT CHANGE UP (ref 150–400)
POTASSIUM SERPL-MCNC: 4.8 MMOL/L — SIGNIFICANT CHANGE UP (ref 3.5–5.3)
POTASSIUM SERPL-SCNC: 4.8 MMOL/L — SIGNIFICANT CHANGE UP (ref 3.5–5.3)
PROT SERPL-MCNC: 5.3 G/DL — LOW (ref 6.6–8.7)
RBC # BLD: 3.9 M/UL — LOW (ref 4.2–5.8)
RBC # FLD: 15.4 % — HIGH (ref 10.3–14.5)
SODIUM SERPL-SCNC: 136 MMOL/L — SIGNIFICANT CHANGE UP (ref 135–145)
WBC # BLD: 7.82 K/UL — SIGNIFICANT CHANGE UP (ref 3.8–10.5)
WBC # FLD AUTO: 7.82 K/UL — SIGNIFICANT CHANGE UP (ref 3.8–10.5)

## 2023-09-15 PROCEDURE — 99233 SBSQ HOSP IP/OBS HIGH 50: CPT | Mod: GC

## 2023-09-15 PROCEDURE — 99233 SBSQ HOSP IP/OBS HIGH 50: CPT

## 2023-09-15 RX ORDER — ACETAMINOPHEN 500 MG
650 TABLET ORAL EVERY 6 HOURS
Refills: 0 | Status: DISCONTINUED | OUTPATIENT
Start: 2023-09-15 | End: 2023-09-22

## 2023-09-15 RX ADMIN — METHADONE HYDROCHLORIDE 10 MILLIGRAM(S): 40 TABLET ORAL at 21:51

## 2023-09-15 RX ADMIN — PIPERACILLIN AND TAZOBACTAM 25 GRAM(S): 4; .5 INJECTION, POWDER, LYOPHILIZED, FOR SOLUTION INTRAVENOUS at 17:10

## 2023-09-15 RX ADMIN — AZITHROMYCIN 255 MILLIGRAM(S): 500 TABLET, FILM COATED ORAL at 21:52

## 2023-09-15 RX ADMIN — MIDODRINE HYDROCHLORIDE 10 MILLIGRAM(S): 2.5 TABLET ORAL at 05:17

## 2023-09-15 RX ADMIN — PIPERACILLIN AND TAZOBACTAM 25 GRAM(S): 4; .5 INJECTION, POWDER, LYOPHILIZED, FOR SOLUTION INTRAVENOUS at 09:15

## 2023-09-15 RX ADMIN — Medication 1 MILLIGRAM(S): at 21:51

## 2023-09-15 RX ADMIN — Medication 1 MILLIGRAM(S): at 09:26

## 2023-09-15 RX ADMIN — CHLORHEXIDINE GLUCONATE 1 APPLICATION(S): 213 SOLUTION TOPICAL at 05:16

## 2023-09-15 RX ADMIN — MIDODRINE HYDROCHLORIDE 10 MILLIGRAM(S): 2.5 TABLET ORAL at 21:51

## 2023-09-15 RX ADMIN — METHADONE HYDROCHLORIDE 10 MILLIGRAM(S): 40 TABLET ORAL at 13:00

## 2023-09-15 RX ADMIN — HEPARIN SODIUM 5000 UNIT(S): 5000 INJECTION INTRAVENOUS; SUBCUTANEOUS at 05:14

## 2023-09-15 RX ADMIN — HEPARIN SODIUM 5000 UNIT(S): 5000 INJECTION INTRAVENOUS; SUBCUTANEOUS at 21:52

## 2023-09-15 RX ADMIN — HEPARIN SODIUM 5000 UNIT(S): 5000 INJECTION INTRAVENOUS; SUBCUTANEOUS at 13:00

## 2023-09-15 RX ADMIN — Medication 650 MILLIGRAM(S): at 09:25

## 2023-09-15 RX ADMIN — Medication 81 MILLIGRAM(S): at 12:56

## 2023-09-15 RX ADMIN — Medication 175 MICROGRAM(S): at 05:16

## 2023-09-15 RX ADMIN — Medication 650 MILLIGRAM(S): at 10:25

## 2023-09-15 RX ADMIN — PIPERACILLIN AND TAZOBACTAM 25 GRAM(S): 4; .5 INJECTION, POWDER, LYOPHILIZED, FOR SOLUTION INTRAVENOUS at 00:45

## 2023-09-15 RX ADMIN — METHADONE HYDROCHLORIDE 10 MILLIGRAM(S): 40 TABLET ORAL at 06:32

## 2023-09-15 RX ADMIN — MIDODRINE HYDROCHLORIDE 10 MILLIGRAM(S): 2.5 TABLET ORAL at 13:00

## 2023-09-15 RX ADMIN — Medication 85 MILLIMOLE(S): at 12:56

## 2023-09-15 NOTE — PROGRESS NOTE ADULT - SUBJECTIVE AND OBJECTIVE BOX
BLAS SUAZO  ----------------------------------------  The patient was seen at bedside. Patient with pneumonia. Offered no complaints. Denied chest pain or dyspnea. Sitting in the chair.    72 yo male with PMHx of LLE lymphedema, hypothyroidism, HFpEF, SVT, LBBB, right pleural effusion, COPD, DDD, recent admission in June 2023 for CAP, presented with AMS and found to be hypoxemic and hypotensive despite 4L crystalloid and was started on iv vasopressor therapy. CT chest revealed worsening multifocal ground glass opacities, septal thickening, and pleural-based nodule in the right lung apex. Pleural nodule require further outpatient pulmonary workup. CT brain without any acute pathology. CXR noted stable partly loculated right pleural effusion. US renal without hydronephrosis. V/Q scan reveals very low probability of pulmonary embolus and B/L LE US found acute noncompressible thrombus involving the right peroneal vein without proximal propagation. TTE without RV strain. Whitehead placed on 9/13 and removed 9/14. Urine cx negative and Bcx without growth on prelim. Continue with broad spectrum abx Zosyn (day 3/7) and azithromycin. Patient is now off pressors and stable for downgrade to medicine.     Vital Signs Last 24 Hrs  T(C): 36.7 (15 Sep 2023 07:20), Max: 37 (14 Sep 2023 23:45)  T(F): 98 (15 Sep 2023 07:20), Max: 98.6 (14 Sep 2023 23:45)  HR: 86 (15 Sep 2023 12:00) (79 - 118)  BP: 122/51 (15 Sep 2023 12:00) (90/59 - 141/120)  BP(mean): 68 (15 Sep 2023 12:00) (65 - 128)  RR: 22 (15 Sep 2023 12:00) (12 - 31)  SpO2: 95% (15 Sep 2023 12:00) (73% - 99%)    Parameters below as of 15 Sep 2023 07:00  Patient On (Oxygen Delivery Method): room air    PHYSICAL EXAMINATION:  ----------------------------------------  General appearance: No acute distress, Awake, Alert  HEENT: Normocephalic, Atraumatic, Conjunctiva clear, EOMI  Neck: Supple, No JVD, No tenderness  Lungs: Breath sound equal bilaterally, No wheezes, Mild basilar rales  Cardiovascular: S1S2, Regular rhythm  Abdomen: Soft, Nontender, Nondistended, No guarding/rebound, Positive bowel sounds  Extremities: No clubbing, No cyanosis, No calf tenderness, left lower extremity edema  Neuro: Strength equal bilaterally, No tremors  Psychiatric: Appropriate mood, Normal affect    LABORATORY STUDIES:  ----------------------------------------             10.7   7.82  )-----------( 172      ( 15 Sep 2023 04:10 )             34.1     09-15    136  |  105  |  14.6  ----------------------------<  97  4.8   |  23.0  |  1.05    Ca    9.3      15 Sep 2023 04:10  Phos  1.8     09-15  Mg     2.0     09-15    TPro  5.3<L>  /  Alb  2.8<L>  /  TBili  0.4  /  DBili  x   /  AST  7   /  ALT  5   /  AlkPhos  60  09-15    LIVER FUNCTIONS - ( 15 Sep 2023 04:10 )  Alb: 2.8 g/dL / Pro: 5.3 g/dL / ALK PHOS: 60 U/L / ALT: 5 U/L / AST: 7 U/L / GGT: x           PT/INR - ( 13 Sep 2023 16:10 )   PT: 11.7 sec;   INR: 1.06 ratio    PTT - ( 13 Sep 2023 16:10 )  PTT:29.1 sec    CARDIAC MARKERS ( 13 Sep 2023 16:10 )  x     / 0.02 ng/mL / x     / x     / x        09-14-23 @ 07:01  -  09-15-23 @ 07:00  --------------------------------------------------------  IN: 775.4 mL / OUT: 1850 mL / NET: -1074.6 mL    09-15-23 @ 07:01  -  09-15-23 @ 12:53  --------------------------------------------------------  IN: 240 mL / OUT: 300 mL / NET: -60 mL    Urinalysis Basic - ( 15 Sep 2023 04:10 )  Color: x / Appearance: x / SG: x / pH: x  Gluc: 97 mg/dL / Ketone: x  / Bili: x / Urobili: x   Blood: x / Protein: x / Nitrite: x   Leuk Esterase: x / RBC: x / WBC x   Sq Epi: x / Non Sq Epi: x / Bacteria: x    Culture - Urine (collected 13 Sep 2023 20:03)  Source: Clean Catch Clean Catch (Midstream)  Final Report (14 Sep 2023 23:17):    No growth    Culture - Blood (collected 13 Sep 2023 16:12)  Source: .Blood Blood-Peripheral  Preliminary Report (14 Sep 2023 22:02):    No growth at 24 hours    Culture - Blood (collected 13 Sep 2023 16:10)  Source: .Blood Blood-Peripheral  Preliminary Report (14 Sep 2023 22:02):    No growth at 24 hours    MEDICATIONS  (STANDING):  aspirin  chewable 81 milliGRAM(s) Oral daily  azithromycin  IVPB      azithromycin  IVPB 500 milliGRAM(s) IV Intermittent every 24 hours  chlorhexidine 2% Cloths 1 Application(s) Topical <User Schedule>  heparin   Injectable 5000 Unit(s) SubCutaneous every 8 hours  levothyroxine 175 MICROGram(s) Oral daily  methadone    Tablet 10 milliGRAM(s) Oral three times a day  midodrine 10 milliGRAM(s) Oral every 8 hours  piperacillin/tazobactam IVPB.. 3.375 Gram(s) IV Intermittent every 8 hours  sodium phosphate 30 milliMole(s)/500 mL IVPB 30 milliMole(s) IV Intermittent once    MEDICATIONS  (PRN):  acetaminophen     Tablet .. 650 milliGRAM(s) Oral every 6 hours PRN Temp greater or equal to 38C (100.4F)  acetaminophen     Tablet .. 650 milliGRAM(s) Oral every 6 hours PRN Mild Pain (1 - 3), Moderate Pain (4 - 6)  albuterol    90 MICROgram(s) HFA Inhaler 2 Puff(s) Inhalation every 6 hours PRN Shortness of Breath and/or Wheezing  ALPRAZolam 1 milliGRAM(s) Oral every 12 hours PRN anxiety      ASSESSMENT / PLAN:  ----------------------------------------  BLAS SUAZO is a 73M with PMHx of LLE lymphedema, hypothyroidism, HFpEF, SVT, LBBB, right pleural effusion, COPD, DDD, and a recent admission in June 2023 for CAP, presents to the ED after exhibiting altered mentation. His niece reported that he was difficult to arouse and later on in the day was found to be wandering around his apartment confused. On presentation to the ED he was found to be hypoxemic and hypotensive despite 4L crystalloid and was started on IV vasopressor therapy. CT chest revealed worsening multifocal ground glass opacities, septal thickening, and a pleural-based nodule in the right lung apex.    Metabolic encephalopathy  - Secondary to hypoxic respiratory failure and pneumonia  - CT of the head was without acute intracranial pathology    Acute hypoxic respiratory failure  - Hypoxia improved, supplemental oxygen tapered off  - On intravenous antibiotics for pneumonia  - V/Q scan with very low probability for pulmonary embolus    Sepsis / Pneumonia  - On piperacillin tazobactam  - Vasopressor support was weaned, on idodrine  - Urine and blood cultures were without growth  - Afebrile, resolved leukocytosis    Lung nodule  - CT of the head noted a 3 x 1.5 x 0.7 cm pleural-based nodule in the right lung apex  - Enlarged in comparison to findings from 2017  - Discussed with the patient, for further follow up with Pulmonary when acute issues have resolved  - For repeat imaging to follow    Acute kidney injury  - Renal function improved on repeat studies    T11 compression fracture  - The patient reported an episode of fall last month  - Neurosurgery consultation noted  - No surgical intervention recommended, family to bring in the back brace  - Analgesic medications as needed    Hypothyroidism  - On levothyroxine    Per    Suspected infiltrate in the right lung apex.  Suspected 3 x 1.5 x 0.7 cm pleural-based nodule in the right lung apex   best seen on images 7-54 and 6-38.   BLAS SUAZO  ----------------------------------------  The patient was seen at bedside. Patient with pneumonia. Offered no complaints. Denied chest pain or dyspnea. Sitting in the chair.    72 yo male with PMHx of LLE lymphedema, hypothyroidism, HFpEF, SVT, LBBB, right pleural effusion, COPD, DDD, recent admission in June 2023 for CAP, presented with AMS and found to be hypoxemic and hypotensive despite 4L crystalloid and was started on iv vasopressor therapy. CT chest revealed worsening multifocal ground glass opacities, septal thickening, and pleural-based nodule in the right lung apex. Pleural nodule require further outpatient pulmonary workup. CT brain without any acute pathology. CXR noted stable partly loculated right pleural effusion. US renal without hydronephrosis. V/Q scan reveals very low probability of pulmonary embolus and B/L LE US found acute noncompressible thrombus involving the right peroneal vein without proximal propagation. TTE without RV strain. Whitehead placed on 9/13 and removed 9/14. Urine cx negative and Bcx without growth on prelim. Continue with broad spectrum abx Zosyn (day 3/7) and azithromycin. Patient is now off pressors and stable for downgrade to medicine.     Vital Signs Last 24 Hrs  T(C): 36.7 (15 Sep 2023 07:20), Max: 37 (14 Sep 2023 23:45)  T(F): 98 (15 Sep 2023 07:20), Max: 98.6 (14 Sep 2023 23:45)  HR: 86 (15 Sep 2023 12:00) (79 - 118)  BP: 122/51 (15 Sep 2023 12:00) (90/59 - 141/120)  BP(mean): 68 (15 Sep 2023 12:00) (65 - 128)  RR: 22 (15 Sep 2023 12:00) (12 - 31)  SpO2: 95% (15 Sep 2023 12:00) (73% - 99%)    Parameters below as of 15 Sep 2023 07:00  Patient On (Oxygen Delivery Method): room air    PHYSICAL EXAMINATION:  ----------------------------------------  General appearance: No acute distress, Awake, Alert  HEENT: Normocephalic, Atraumatic, Conjunctiva clear, EOMI  Neck: Supple, No JVD, No tenderness  Lungs: Breath sound equal bilaterally, No wheezes, Mild basilar rales  Cardiovascular: S1S2, Regular rhythm  Abdomen: Soft, Nontender, Nondistended, No guarding/rebound, Positive bowel sounds  Extremities: No clubbing, No cyanosis, No calf tenderness, left lower extremity edema  Neuro: Strength equal bilaterally, No tremors  Psychiatric: Appropriate mood, Normal affect    LABORATORY STUDIES:  ----------------------------------------             10.7   7.82  )-----------( 172      ( 15 Sep 2023 04:10 )             34.1     09-15    136  |  105  |  14.6  ----------------------------<  97  4.8   |  23.0  |  1.05    Ca    9.3      15 Sep 2023 04:10  Phos  1.8     09-15  Mg     2.0     09-15    TPro  5.3<L>  /  Alb  2.8<L>  /  TBili  0.4  /  DBili  x   /  AST  7   /  ALT  5   /  AlkPhos  60  09-15    LIVER FUNCTIONS - ( 15 Sep 2023 04:10 )  Alb: 2.8 g/dL / Pro: 5.3 g/dL / ALK PHOS: 60 U/L / ALT: 5 U/L / AST: 7 U/L / GGT: x           PT/INR - ( 13 Sep 2023 16:10 )   PT: 11.7 sec;   INR: 1.06 ratio    PTT - ( 13 Sep 2023 16:10 )  PTT:29.1 sec    CARDIAC MARKERS ( 13 Sep 2023 16:10 )  x     / 0.02 ng/mL / x     / x     / x        09-14-23 @ 07:01  -  09-15-23 @ 07:00  --------------------------------------------------------  IN: 775.4 mL / OUT: 1850 mL / NET: -1074.6 mL    09-15-23 @ 07:01  -  09-15-23 @ 12:53  --------------------------------------------------------  IN: 240 mL / OUT: 300 mL / NET: -60 mL    Urinalysis Basic - ( 15 Sep 2023 04:10 )  Color: x / Appearance: x / SG: x / pH: x  Gluc: 97 mg/dL / Ketone: x  / Bili: x / Urobili: x   Blood: x / Protein: x / Nitrite: x   Leuk Esterase: x / RBC: x / WBC x   Sq Epi: x / Non Sq Epi: x / Bacteria: x    Culture - Urine (collected 13 Sep 2023 20:03)  Source: Clean Catch Clean Catch (Midstream)  Final Report (14 Sep 2023 23:17):    No growth    Culture - Blood (collected 13 Sep 2023 16:12)  Source: .Blood Blood-Peripheral  Preliminary Report (14 Sep 2023 22:02):    No growth at 24 hours    Culture - Blood (collected 13 Sep 2023 16:10)  Source: .Blood Blood-Peripheral  Preliminary Report (14 Sep 2023 22:02):    No growth at 24 hours    MEDICATIONS  (STANDING):  aspirin  chewable 81 milliGRAM(s) Oral daily  azithromycin  IVPB      azithromycin  IVPB 500 milliGRAM(s) IV Intermittent every 24 hours  chlorhexidine 2% Cloths 1 Application(s) Topical <User Schedule>  heparin   Injectable 5000 Unit(s) SubCutaneous every 8 hours  levothyroxine 175 MICROGram(s) Oral daily  methadone    Tablet 10 milliGRAM(s) Oral three times a day  midodrine 10 milliGRAM(s) Oral every 8 hours  piperacillin/tazobactam IVPB.. 3.375 Gram(s) IV Intermittent every 8 hours  sodium phosphate 30 milliMole(s)/500 mL IVPB 30 milliMole(s) IV Intermittent once    MEDICATIONS  (PRN):  acetaminophen     Tablet .. 650 milliGRAM(s) Oral every 6 hours PRN Temp greater or equal to 38C (100.4F)  acetaminophen     Tablet .. 650 milliGRAM(s) Oral every 6 hours PRN Mild Pain (1 - 3), Moderate Pain (4 - 6)  albuterol    90 MICROgram(s) HFA Inhaler 2 Puff(s) Inhalation every 6 hours PRN Shortness of Breath and/or Wheezing  ALPRAZolam 1 milliGRAM(s) Oral every 12 hours PRN anxiety      ASSESSMENT / PLAN:  ----------------------------------------  BLAS SUAZO is a 73M with PMHx of LLE lymphedema, hypothyroidism, HFpEF, SVT, LBBB, right pleural effusion, COPD, DDD, and a recent admission in June 2023 for CAP, presents to the ED after exhibiting altered mentation. His niece reported that he was difficult to arouse and later on in the day was found to be wandering around his apartment confused. On presentation to the ED he was found to be hypoxemic and hypotensive despite 4L crystalloid and was started on IV vasopressor therapy. CT chest revealed worsening multifocal ground glass opacities, septal thickening, and a pleural-based nodule in the right lung apex.    Metabolic encephalopathy  - Secondary to hypoxic respiratory failure and pneumonia  - CT of the head was without acute intracranial pathology    Acute hypoxic respiratory failure  - Hypoxia improved, supplemental oxygen tapered off  - On intravenous antibiotics for pneumonia  - V/Q scan with very low probability for pulmonary embolus    Sepsis / Pneumonia  - On piperacillin tazobactam  - Vasopressor support was weaned, on idodrine  - Urine and blood cultures were without growth  - Afebrile, resolved leukocytosis    Lung nodule  - CT of the head noted a 3 x 1.5 x 0.7 cm pleural-based nodule in the right lung apex  - Enlarged in comparison to findings from 2017  - Discussed with the patient, for further follow up with Pulmonary when acute issues have resolved  - For repeat imaging to follow    Acute kidney injury  - Renal function improved on repeat studies    T11 compression fracture  - The patient reported an episode of fall last month  - Neurosurgery consultation noted  - No surgical intervention recommended, family to bring in the back brace  - Analgesic medications as needed    Hypothyroidism  - On levothyroxine    Peroneal vein thrombosis  - Below knee thrombus without proximal propagation  - Not started on anticoagulation

## 2023-09-15 NOTE — CHART NOTE - NSCHARTNOTEFT_GEN_A_CORE
74 yo male with PMHx of LLE lymphedema, hypothyroidism, HFpEF, SVT, LBBB, right pleural effusion, COPD, DDD, recent admission in June 2023 for CAP, presented with AMS and found to be hypoxemic and hypotensive despite 4L crystalloid and was started on iv vasopressor therapy. CT chest revealed worsening multifocal ground glass opacities, septal thickening, and pleural-based nodule in the right lung apex. Pleural nodule require further outpatient pulmonary workup. CT brain without any acute pathology. CXR noted stable partly loculated right pleural effusion. US renal without hydronephrosis. V/Q scan reveals very low probability of pulmonary embolus and B/L LE US found acute noncompressible thrombus involving the right peroneal vein without proximal propagation. TTE without RV strain. Whitehead placed on 9/13 and removed 9/14. Urine cx negative and Bcx without growth on prelim. Continue with broad spectrum abx Zosyn (day 3/7) and azithromycin. Patient is now off pressors and stable for downgrade to medicine.     Plan d/w with MICU attending Dr. Hollingsworth and the accepting physician Dr. Bonner.       Vital Signs Last 24 Hrs  T(C): 36.7 (15 Sep 2023 07:20), Max: 37 (14 Sep 2023 23:45)  T(F): 98 (15 Sep 2023 07:20), Max: 98.6 (14 Sep 2023 23:45)  HR: 86 (15 Sep 2023 12:00) (79 - 118)  BP: 122/51 (15 Sep 2023 12:00) (90/59 - 141/120)  BP(mean): 68 (15 Sep 2023 12:00) (65 - 128)  RR: 22 (15 Sep 2023 12:00) (12 - 31)  SpO2: 95% (15 Sep 2023 12:00) (73% - 99%)    Parameters below as of 15 Sep 2023 07:00  Patient On (Oxygen Delivery Method): room air    Physical Examination:  General: Well-groomed, no acute distress.  NEURO: A&Ox3, nonfocal exam  HEENT: Atraumatic, normocephalic; +PERRLA, EOMI; nares patent; mucous membranes pink, moist, and intact; neck supple, trachea midline  PULM: Fine crackles noted on the right, no significant sputum production  CVS: NSR on the cardiac monitor; regular rate and rhythm; no murmurs, rubs, or gallops  ABD: Soft, nondistended, nontender, normoactive bowel sounds, no masses  EXT: No edema, cyanosis, or clubbing noted   SKIN: Warm and well perfused, no rashes noted. 74 yo male with PMHx of LLE lymphedema, hypothyroidism, HFpEF, SVT, LBBB, right pleural effusion, COPD, DDD, recent admission in June 2023 for CAP, presented with AMS and found to be hypoxemic and hypotensive despite 4L crystalloid and was started on iv vasopressor therapy. CT chest revealed worsening multifocal ground glass opacities, septal thickening, and pleural-based nodule in the right lung apex. Pleural nodule require further outpatient pulmonary workup. CT brain without any acute pathology. CXR noted stable partly loculated right pleural effusion. US renal without hydronephrosis. V/Q scan reveals very low probability of pulmonary embolus and B/L LE US found acute noncompressible thrombus involving the right peroneal vein without proximal propagation. TTE without RV strain. Whitehead placed on 9/13 and removed 9/14. Urine cx negative and Bcx without growth on prelim. Continue with broad spectrum abx Zosyn (day 3/7) and azithromycin. Patient is now off pressors and stable for downgrade to medicine.     Plan d/w with MICU attending Dr. Hollingsworth and the accepting physician Dr. Bonner.       Vital Signs Last 24 Hrs  T(C): 36.7 (15 Sep 2023 07:20), Max: 37 (14 Sep 2023 23:45)  T(F): 98 (15 Sep 2023 07:20), Max: 98.6 (14 Sep 2023 23:45)  HR: 86 (15 Sep 2023 12:00) (79 - 118)  BP: 122/51 (15 Sep 2023 12:00) (90/59 - 141/120)  BP(mean): 68 (15 Sep 2023 12:00) (65 - 128)  RR: 22 (15 Sep 2023 12:00) (12 - 31)  SpO2: 95% (15 Sep 2023 12:00) (73% - 99%)    Parameters below as of 15 Sep 2023 07:00  Patient On (Oxygen Delivery Method): room air    Physical Examination:  General: Well-groomed, no acute distress.  NEURO: A&Ox3, nonfocal exam  HEENT: Atraumatic, normocephalic; +PERRLA, EOMI; nares patent; mucous membranes pink, moist, and intact; neck supple, trachea midline  PULM: Fine crackles noted on the right, no significant sputum production  CVS: NSR on the cardiac monitor; regular rate and rhythm; no murmurs, rubs, or gallops  ABD: Soft, nondistended, nontender, normoactive bowel sounds, no masses  EXT: No edema, cyanosis, or clubbing noted   SKIN: Warm and well perfused, no rashes noted.      **attending attestation**    73M with PMHx of LLE lymphedema, hypothyroidism, HFpEF, SVT, LBBB, right pleural effusion, COPD, DDD, and a recent admission in June 2023 for CAP, Admitted to MICU for septic shock 2/2 to pneumonia needing pressor.  Patient was started on broad spectrum antibiotic.  MIdodrine also started to help weaning off pressor, and ct scan was obtained to better visualize the pneumonia. Blood culture and urine culture unrevealing, patient mental status is improved.  And pressor was weaned off since 9/14.    Given of questionable history of syncope, PE workup performed.  RLE doppler + for thrombosis at peroneal vein, below the knee level.  VQ scan obtained, very low probabily for PE.  ECHO obtained, no right heart strain.  At this time, patient has no RLE pain, will repeat ultrasound in 1-2 weeks, if persist, or develops pain, will start AC.   Appreciate NS consult for T11 fracture, outpatient follow and continue using brace.    pulmnary nodule: will need repeat ct chest in 6 weeks, and likely outpatient to follow up.   Patient stable to be downgraded to the medical floor    A total of 65 minutes were spent on the entire encounter. Greater than 50% of the time was spent reviewing labs, notes, orders, radiographic studies, as well as counseling and coordinating care with the relevant multidisciplinary team, including with the ICU resident, PA, medicine team.

## 2023-09-16 LAB
ANION GAP SERPL CALC-SCNC: 12 MMOL/L — SIGNIFICANT CHANGE UP (ref 5–17)
BUN SERPL-MCNC: 9 MG/DL — SIGNIFICANT CHANGE UP (ref 8–20)
CALCIUM SERPL-MCNC: 9.2 MG/DL — SIGNIFICANT CHANGE UP (ref 8.4–10.5)
CHLORIDE SERPL-SCNC: 105 MMOL/L — SIGNIFICANT CHANGE UP (ref 96–108)
CO2 SERPL-SCNC: 21 MMOL/L — LOW (ref 22–29)
CREAT SERPL-MCNC: 0.85 MG/DL — SIGNIFICANT CHANGE UP (ref 0.5–1.3)
EGFR: 91 ML/MIN/1.73M2 — SIGNIFICANT CHANGE UP
GLUCOSE SERPL-MCNC: 90 MG/DL — SIGNIFICANT CHANGE UP (ref 70–99)
HCT VFR BLD CALC: 33.2 % — LOW (ref 39–50)
HGB BLD-MCNC: 10.4 G/DL — LOW (ref 13–17)
LEGIONELLA AG UR QL: NEGATIVE — SIGNIFICANT CHANGE UP
MAGNESIUM SERPL-MCNC: 1.9 MG/DL — SIGNIFICANT CHANGE UP (ref 1.6–2.6)
MCHC RBC-ENTMCNC: 26.9 PG — LOW (ref 27–34)
MCHC RBC-ENTMCNC: 31.3 GM/DL — LOW (ref 32–36)
MCV RBC AUTO: 85.8 FL — SIGNIFICANT CHANGE UP (ref 80–100)
PHOSPHATE SERPL-MCNC: 1.8 MG/DL — LOW (ref 2.4–4.7)
PLATELET # BLD AUTO: 184 K/UL — SIGNIFICANT CHANGE UP (ref 150–400)
POTASSIUM SERPL-MCNC: 3.9 MMOL/L — SIGNIFICANT CHANGE UP (ref 3.5–5.3)
POTASSIUM SERPL-SCNC: 3.9 MMOL/L — SIGNIFICANT CHANGE UP (ref 3.5–5.3)
RBC # BLD: 3.87 M/UL — LOW (ref 4.2–5.8)
RBC # FLD: 14.9 % — HIGH (ref 10.3–14.5)
S PNEUM AG UR QL: NEGATIVE — SIGNIFICANT CHANGE UP
SODIUM SERPL-SCNC: 138 MMOL/L — SIGNIFICANT CHANGE UP (ref 135–145)
WBC # BLD: 6.96 K/UL — SIGNIFICANT CHANGE UP (ref 3.8–10.5)
WBC # FLD AUTO: 6.96 K/UL — SIGNIFICANT CHANGE UP (ref 3.8–10.5)

## 2023-09-16 PROCEDURE — 99233 SBSQ HOSP IP/OBS HIGH 50: CPT

## 2023-09-16 RX ORDER — METHADONE HYDROCHLORIDE 40 MG/1
10 TABLET ORAL
Refills: 0 | Status: DISCONTINUED | OUTPATIENT
Start: 2023-09-16 | End: 2023-09-18

## 2023-09-16 RX ORDER — SODIUM,POTASSIUM PHOSPHATES 278-250MG
1 POWDER IN PACKET (EA) ORAL ONCE
Refills: 0 | Status: COMPLETED | OUTPATIENT
Start: 2023-09-16 | End: 2023-09-16

## 2023-09-16 RX ADMIN — AZITHROMYCIN 255 MILLIGRAM(S): 500 TABLET, FILM COATED ORAL at 22:33

## 2023-09-16 RX ADMIN — PIPERACILLIN AND TAZOBACTAM 25 GRAM(S): 4; .5 INJECTION, POWDER, LYOPHILIZED, FOR SOLUTION INTRAVENOUS at 17:00

## 2023-09-16 RX ADMIN — HEPARIN SODIUM 5000 UNIT(S): 5000 INJECTION INTRAVENOUS; SUBCUTANEOUS at 13:57

## 2023-09-16 RX ADMIN — METHADONE HYDROCHLORIDE 10 MILLIGRAM(S): 40 TABLET ORAL at 22:32

## 2023-09-16 RX ADMIN — MIDODRINE HYDROCHLORIDE 10 MILLIGRAM(S): 2.5 TABLET ORAL at 06:12

## 2023-09-16 RX ADMIN — METHADONE HYDROCHLORIDE 10 MILLIGRAM(S): 40 TABLET ORAL at 13:57

## 2023-09-16 RX ADMIN — PIPERACILLIN AND TAZOBACTAM 25 GRAM(S): 4; .5 INJECTION, POWDER, LYOPHILIZED, FOR SOLUTION INTRAVENOUS at 09:12

## 2023-09-16 RX ADMIN — Medication 650 MILLIGRAM(S): at 15:23

## 2023-09-16 RX ADMIN — CHLORHEXIDINE GLUCONATE 1 APPLICATION(S): 213 SOLUTION TOPICAL at 06:13

## 2023-09-16 RX ADMIN — Medication 81 MILLIGRAM(S): at 09:13

## 2023-09-16 RX ADMIN — Medication 1 MILLIGRAM(S): at 17:29

## 2023-09-16 RX ADMIN — Medication 650 MILLIGRAM(S): at 13:56

## 2023-09-16 RX ADMIN — METHADONE HYDROCHLORIDE 10 MILLIGRAM(S): 40 TABLET ORAL at 06:13

## 2023-09-16 RX ADMIN — Medication 1 PACKET(S): at 17:02

## 2023-09-16 RX ADMIN — PIPERACILLIN AND TAZOBACTAM 25 GRAM(S): 4; .5 INJECTION, POWDER, LYOPHILIZED, FOR SOLUTION INTRAVENOUS at 01:01

## 2023-09-16 RX ADMIN — Medication 175 MICROGRAM(S): at 06:12

## 2023-09-16 RX ADMIN — HEPARIN SODIUM 5000 UNIT(S): 5000 INJECTION INTRAVENOUS; SUBCUTANEOUS at 22:33

## 2023-09-16 RX ADMIN — HEPARIN SODIUM 5000 UNIT(S): 5000 INJECTION INTRAVENOUS; SUBCUTANEOUS at 06:17

## 2023-09-16 NOTE — PROGRESS NOTE ADULT - ASSESSMENT
73M with PMHx of LLE lymphedema, hypothyroidism, HFpEF, SVT, LBBB, right pleural effusion, COPD, DDD, and a recent admission in June 2023 for CAP, presents to the ED after exhibiting altered mentation. His niece reported that he was difficult to arouse and later on in the day was found to be wandering around his apartment confused. On presentation to the ED he was found to be hypoxemic and hypotensive despite 4L crystalloid and was started on IV vasopressor therapy. CT chest revealed worsening multifocal ground glass opacities, septal thickening, and a pleural-based nodule in the right lung apex.    Metabolic encephalopathy  - Secondary to hypoxic respiratory failure and pneumonia  - CT of the head was without acute intracranial pathology    Acute hypoxic respiratory failure  - Hypoxia improved, supplemental oxygen tapered off  - On intravenous antibiotics for pneumonia  - V/Q scan with very low probability for pulmonary embolus    Sepsis / Pneumonia  - On piperacillin tazobactam and azithro   - Vasopressor support was weaned, on idodrine  - Urine and blood cultures were without growth    Lung nodule  - CT of the head noted a 3 x 1.5 x 0.7 cm pleural-based nodule in the right lung apex  - Enlarged in comparison to findings from 2017  - Discussed with the patient, for further follow up with Pulmonary when acute issues have resolved  - For repeat imaging to follow    Acute kidney injury  - Renal function improved on repeat studies    T11 compression fracture  - The patient reported an episode of fall last month  - Neurosurgery consultation noted  - No surgical intervention recommended, family to bring in the back brace  - Analgesic medications as needed  - taper methadone, unclear why started in icu    Hypothyroidism  - On levothyroxine    Peroneal vein thrombosis  - Below knee thrombus without proximal propagation  - Not started on anticoagulation

## 2023-09-16 NOTE — PROGRESS NOTE ADULT - SUBJECTIVE AND OBJECTIVE BOX
BLAS SUAZO    [id number]    73y      Male    INTERVAL HPI/OVERNIGHT EVENTS:  patient being seen for sepsis and ams. Patient seen at bedside and is tired appearing but is awake and alert    REVIEW OF SYSTEMS:    CONSTITUTIONAL: No fever, weight loss, or fatigue  RESPIRATORY: No cough, wheezing, hemoptysis; No shortness of breath  CARDIOVASCULAR: No chest pain, palpitations  GASTROINTESTINAL: No abdominal or epigastric pain. No nausea, vomiting  NEUROLOGICAL: No headaches, memory loss, loss of strength.  MISCELLANEOUS:      Vital Signs Last 24 Hrs  T(C): 36.9 (16 Sep 2023 17:24), Max: 36.9 (16 Sep 2023 17:24)  T(F): 98.4 (16 Sep 2023 17:24), Max: 98.4 (16 Sep 2023 17:24)  HR: 80 (16 Sep 2023 17:24) (77 - 82)  BP: 137/67 (16 Sep 2023 17:24) (137/67 - 158/79)  BP(mean): --  RR: 18 (16 Sep 2023 17:24) (18 - 19)  SpO2: 93% (16 Sep 2023 17:24) (91% - 95%)    Parameters below as of 16 Sep 2023 12:29  Patient On (Oxygen Delivery Method): room air        PHYSICAL EXAM:  General appearance: No acute distress, Awake, Alert  HEENT: Normocephalic, Atraumatic, Conjunctiva clear, EOMI  Neck: Supple, No JVD, No tenderness  Lungs: Breath sound equal bilaterally, No wheezes, Mild basilar rales  Cardiovascular: S1S2, Regular rhythm  Abdomen: Soft, Nontender, Nondistended, No guarding/rebound, Positive bowel sounds  Extremities: No clubbing, No cyanosis, No calf tenderness, left lower extremity edema  Neuro: Strength equal bilaterally, No tremors  Psychiatric: Appropriate mood, Normal affect        LABS:                        10.4   6.96  )-----------( 184      ( 16 Sep 2023 06:07 )             33.2     09-16    138  |  105  |  9.0  ----------------------------<  90  3.9   |  21.0<L>  |  0.85    Ca    9.2      16 Sep 2023 06:07  Phos  1.8     09-16  Mg     1.9     09-16    TPro  5.3<L>  /  Alb  2.8<L>  /  TBili  0.4  /  DBili  x   /  AST  7   /  ALT  5   /  AlkPhos  60  09-15      Urinalysis Basic - ( 16 Sep 2023 06:07 )    Color: x / Appearance: x / SG: x / pH: x  Gluc: 90 mg/dL / Ketone: x  / Bili: x / Urobili: x   Blood: x / Protein: x / Nitrite: x   Leuk Esterase: x / RBC: x / WBC x   Sq Epi: x / Non Sq Epi: x / Bacteria: x          MEDICATIONS  (STANDING):  aspirin  chewable 81 milliGRAM(s) Oral daily  azithromycin  IVPB 500 milliGRAM(s) IV Intermittent every 24 hours  azithromycin  IVPB      chlorhexidine 2% Cloths 1 Application(s) Topical <User Schedule>  heparin   Injectable 5000 Unit(s) SubCutaneous every 8 hours  levothyroxine 175 MICROGram(s) Oral daily  methadone    Tablet 10 milliGRAM(s) Oral three times a day  midodrine 10 milliGRAM(s) Oral every 8 hours  piperacillin/tazobactam IVPB.. 3.375 Gram(s) IV Intermittent every 8 hours    MEDICATIONS  (PRN):  acetaminophen     Tablet .. 650 milliGRAM(s) Oral every 6 hours PRN Temp greater or equal to 38C (100.4F)  acetaminophen     Tablet .. 650 milliGRAM(s) Oral every 6 hours PRN Mild Pain (1 - 3), Moderate Pain (4 - 6)  albuterol    90 MICROgram(s) HFA Inhaler 2 Puff(s) Inhalation every 6 hours PRN Shortness of Breath and/or Wheezing  ALPRAZolam 1 milliGRAM(s) Oral every 12 hours PRN anxiety  guaiFENesin Oral Liquid (Sugar-Free) 100 milliGRAM(s) Oral every 6 hours PRN Cough      RADIOLOGY & ADDITIONAL TESTS:

## 2023-09-17 LAB
ALBUMIN SERPL ELPH-MCNC: 2.8 G/DL — LOW (ref 3.3–5.2)
ALP SERPL-CCNC: 61 U/L — SIGNIFICANT CHANGE UP (ref 40–120)
ALT FLD-CCNC: 5 U/L — SIGNIFICANT CHANGE UP
AMMONIA BLD-MCNC: <10 UMOL/L — LOW (ref 11–55)
ANION GAP SERPL CALC-SCNC: 12 MMOL/L — SIGNIFICANT CHANGE UP (ref 5–17)
AST SERPL-CCNC: 7 U/L — SIGNIFICANT CHANGE UP
BASOPHILS # BLD AUTO: 0.03 K/UL — SIGNIFICANT CHANGE UP (ref 0–0.2)
BASOPHILS NFR BLD AUTO: 0.5 % — SIGNIFICANT CHANGE UP (ref 0–2)
BILIRUB SERPL-MCNC: 0.4 MG/DL — SIGNIFICANT CHANGE UP (ref 0.4–2)
BUN SERPL-MCNC: 7.1 MG/DL — LOW (ref 8–20)
CALCIUM SERPL-MCNC: 9.1 MG/DL — SIGNIFICANT CHANGE UP (ref 8.4–10.5)
CHLORIDE SERPL-SCNC: 103 MMOL/L — SIGNIFICANT CHANGE UP (ref 96–108)
CO2 SERPL-SCNC: 23 MMOL/L — SIGNIFICANT CHANGE UP (ref 22–29)
CREAT SERPL-MCNC: 0.82 MG/DL — SIGNIFICANT CHANGE UP (ref 0.5–1.3)
EGFR: 92 ML/MIN/1.73M2 — SIGNIFICANT CHANGE UP
EOSINOPHIL # BLD AUTO: 0.15 K/UL — SIGNIFICANT CHANGE UP (ref 0–0.5)
EOSINOPHIL NFR BLD AUTO: 2.3 % — SIGNIFICANT CHANGE UP (ref 0–6)
GLUCOSE SERPL-MCNC: 85 MG/DL — SIGNIFICANT CHANGE UP (ref 70–99)
HCT VFR BLD CALC: 35.3 % — LOW (ref 39–50)
HGB BLD-MCNC: 11.3 G/DL — LOW (ref 13–17)
IMM GRANULOCYTES NFR BLD AUTO: 0.9 % — SIGNIFICANT CHANGE UP (ref 0–0.9)
LYMPHOCYTES # BLD AUTO: 0.7 K/UL — LOW (ref 1–3.3)
LYMPHOCYTES # BLD AUTO: 10.6 % — LOW (ref 13–44)
MAGNESIUM SERPL-MCNC: 1.8 MG/DL — SIGNIFICANT CHANGE UP (ref 1.6–2.6)
MCHC RBC-ENTMCNC: 27.4 PG — SIGNIFICANT CHANGE UP (ref 27–34)
MCHC RBC-ENTMCNC: 32 GM/DL — SIGNIFICANT CHANGE UP (ref 32–36)
MCV RBC AUTO: 85.5 FL — SIGNIFICANT CHANGE UP (ref 80–100)
MONOCYTES # BLD AUTO: 0.52 K/UL — SIGNIFICANT CHANGE UP (ref 0–0.9)
MONOCYTES NFR BLD AUTO: 7.9 % — SIGNIFICANT CHANGE UP (ref 2–14)
NEUTROPHILS # BLD AUTO: 5.14 K/UL — SIGNIFICANT CHANGE UP (ref 1.8–7.4)
NEUTROPHILS NFR BLD AUTO: 77.8 % — HIGH (ref 43–77)
PLATELET # BLD AUTO: 213 K/UL — SIGNIFICANT CHANGE UP (ref 150–400)
POTASSIUM SERPL-MCNC: 4.2 MMOL/L — SIGNIFICANT CHANGE UP (ref 3.5–5.3)
POTASSIUM SERPL-SCNC: 4.2 MMOL/L — SIGNIFICANT CHANGE UP (ref 3.5–5.3)
PROT SERPL-MCNC: 5.6 G/DL — LOW (ref 6.6–8.7)
RBC # BLD: 4.13 M/UL — LOW (ref 4.2–5.8)
RBC # FLD: 14.9 % — HIGH (ref 10.3–14.5)
SODIUM SERPL-SCNC: 138 MMOL/L — SIGNIFICANT CHANGE UP (ref 135–145)
TSH SERPL-MCNC: <0.1 UIU/ML — LOW (ref 0.27–4.2)
VIT B12 SERPL-MCNC: 198 PG/ML — LOW (ref 232–1245)
WBC # BLD: 6.6 K/UL — SIGNIFICANT CHANGE UP (ref 3.8–10.5)
WBC # FLD AUTO: 6.6 K/UL — SIGNIFICANT CHANGE UP (ref 3.8–10.5)

## 2023-09-17 PROCEDURE — 99232 SBSQ HOSP IP/OBS MODERATE 35: CPT

## 2023-09-17 RX ORDER — PREGABALIN 225 MG/1
1000 CAPSULE ORAL DAILY
Refills: 0 | Status: COMPLETED | OUTPATIENT
Start: 2023-09-17 | End: 2023-09-21

## 2023-09-17 RX ORDER — MIDODRINE HYDROCHLORIDE 2.5 MG/1
2.5 TABLET ORAL EVERY 8 HOURS
Refills: 0 | Status: DISCONTINUED | OUTPATIENT
Start: 2023-09-17 | End: 2023-09-17

## 2023-09-17 RX ORDER — MIDODRINE HYDROCHLORIDE 2.5 MG/1
2.5 TABLET ORAL THREE TIMES A DAY
Refills: 0 | Status: DISCONTINUED | OUTPATIENT
Start: 2023-09-17 | End: 2023-09-18

## 2023-09-17 RX ADMIN — PIPERACILLIN AND TAZOBACTAM 25 GRAM(S): 4; .5 INJECTION, POWDER, LYOPHILIZED, FOR SOLUTION INTRAVENOUS at 01:14

## 2023-09-17 RX ADMIN — PIPERACILLIN AND TAZOBACTAM 25 GRAM(S): 4; .5 INJECTION, POWDER, LYOPHILIZED, FOR SOLUTION INTRAVENOUS at 09:01

## 2023-09-17 RX ADMIN — HEPARIN SODIUM 5000 UNIT(S): 5000 INJECTION INTRAVENOUS; SUBCUTANEOUS at 06:17

## 2023-09-17 RX ADMIN — PIPERACILLIN AND TAZOBACTAM 25 GRAM(S): 4; .5 INJECTION, POWDER, LYOPHILIZED, FOR SOLUTION INTRAVENOUS at 17:05

## 2023-09-17 RX ADMIN — Medication 81 MILLIGRAM(S): at 12:19

## 2023-09-17 RX ADMIN — HEPARIN SODIUM 5000 UNIT(S): 5000 INJECTION INTRAVENOUS; SUBCUTANEOUS at 13:53

## 2023-09-17 RX ADMIN — METHADONE HYDROCHLORIDE 10 MILLIGRAM(S): 40 TABLET ORAL at 09:00

## 2023-09-17 RX ADMIN — CHLORHEXIDINE GLUCONATE 1 APPLICATION(S): 213 SOLUTION TOPICAL at 06:17

## 2023-09-17 RX ADMIN — METHADONE HYDROCHLORIDE 10 MILLIGRAM(S): 40 TABLET ORAL at 22:31

## 2023-09-17 RX ADMIN — AZITHROMYCIN 255 MILLIGRAM(S): 500 TABLET, FILM COATED ORAL at 21:09

## 2023-09-17 RX ADMIN — HEPARIN SODIUM 5000 UNIT(S): 5000 INJECTION INTRAVENOUS; SUBCUTANEOUS at 22:32

## 2023-09-17 RX ADMIN — PREGABALIN 1000 MICROGRAM(S): 225 CAPSULE ORAL at 12:19

## 2023-09-17 RX ADMIN — Medication 175 MICROGRAM(S): at 06:18

## 2023-09-17 NOTE — PROGRESS NOTE ADULT - ASSESSMENT
73M with PMHx of LLE lymphedema, hypothyroidism, HFpEF, SVT, LBBB, right pleural effusion, COPD, DDD, and a recent admission in June 2023 for CAP, presents to the ED after exhibiting altered mentation. His niece reported that he was difficult to arouse and later on in the day was found to be wandering around his apartment confused. On presentation to the ED he was found to be hypoxemic and hypotensive despite 4L crystalloid and was started on IV vasopressor therapy. CT chest revealed worsening multifocal ground glass opacities, septal thickening, and a pleural-based nodule in the right lung apex.    Metabolic encephalopathy  - Secondary to hypoxic respiratory failure and pneumonia  - CT of the head was without acute intracranial pathology  - at baseline    Acute hypoxic respiratory failure  - Hypoxia improved, supplemental oxygen tapered off  - On intravenous antibiotics for pneumonia  - V/Q scan with very low probability for pulmonary embolus    Sepsis / Pneumonia  - On piperacillin tazobactam and azithro   - Vasopressor support was weaned, on midodrine decrease  - Urine and blood cultures were without growth    Lung nodule  - CT of the head noted a 3 x 1.5 x 0.7 cm pleural-based nodule in the right lung apex    Acute kidney injury  - Renal function improved on repeat studies    T11 compression fracture  - The patient reported an episode of fall last month  - Neurosurgery consultation noted  - No surgical intervention recommended, family to bring in the back brace  - Analgesic medications as needed  - taper methadone, unclear why started in icu    Hypothyroidism  - On levothyroxine  - send thyroid panel   - tsh suppressed    Peroneal vein thrombosis  - Below knee thrombus without proximal propagation  - Not started on anticoagulation    vit b12 def - start cyanocobalamin

## 2023-09-17 NOTE — PROGRESS NOTE ADULT - SUBJECTIVE AND OBJECTIVE BOX
BLAS SUAZO    [id number]    73y      Male    INTERVAL HPI/OVERNIGHT EVENTS:  patient being seen for sepsis and ams. patient seen at bedside and states feeling better      REVIEW OF SYSTEMS:    CONSTITUTIONAL: No fever, weight loss, or fatigue  RESPIRATORY: No cough, wheezing, hemoptysis; No shortness of breath  CARDIOVASCULAR: No chest pain, palpitations  GASTROINTESTINAL: No abdominal or epigastric pain. No nausea, vomiting  NEUROLOGICAL: No headaches, memory loss, loss of strength.  MISCELLANEOUS:      Vital Signs Last 24 Hrs  T(C): 36.4 (17 Sep 2023 11:55), Max: 36.9 (16 Sep 2023 17:24)  T(F): 97.6 (17 Sep 2023 11:55), Max: 98.4 (16 Sep 2023 17:24)  HR: 95 (17 Sep 2023 11:55) (80 - 95)  BP: 138/77 (17 Sep 2023 11:55) (137/67 - 156/78)  BP(mean): --  RR: 18 (17 Sep 2023 11:55) (18 - 18)  SpO2: 98% (17 Sep 2023 11:55) (92% - 98%)    Parameters below as of 17 Sep 2023 11:55  Patient On (Oxygen Delivery Method): room air        PHYSICAL EXAM:    General appearance: No acute distress, Awake, Alert  HEENT: Normocephalic, Atraumatic, Conjunctiva clear, EOMI  Neck: Supple, No JVD, No tenderness  Lungs: Breath sound equal bilaterally, No wheezes, Mild basilar rales  Cardiovascular: S1S2, Regular rhythm  Abdomen: Soft, Nontender, Nondistended, No guarding/rebound, Positive bowel sounds  Extremities: No clubbing, No cyanosis, No calf tenderness, left lower extremity edema  Neuro: Strength equal bilaterally, No tremors  Psychiatric: Appropriate mood, Normal affect      LABS:                        11.3   6.60  )-----------( 213      ( 17 Sep 2023 04:37 )             35.3     09-17    138  |  103  |  7.1<L>  ----------------------------<  85  4.2   |  23.0  |  0.82    Ca    9.1      17 Sep 2023 04:37  Phos  1.8     09-16  Mg     1.8     09-17    TPro  5.6<L>  /  Alb  2.8<L>  /  TBili  0.4  /  DBili  x   /  AST  7   /  ALT  5   /  AlkPhos  61  09-17      Urinalysis Basic - ( 17 Sep 2023 04:37 )    Color: x / Appearance: x / SG: x / pH: x  Gluc: 85 mg/dL / Ketone: x  / Bili: x / Urobili: x   Blood: x / Protein: x / Nitrite: x   Leuk Esterase: x / RBC: x / WBC x   Sq Epi: x / Non Sq Epi: x / Bacteria: x          MEDICATIONS  (STANDING):  aspirin  chewable 81 milliGRAM(s) Oral daily  azithromycin  IVPB      azithromycin  IVPB 500 milliGRAM(s) IV Intermittent every 24 hours  chlorhexidine 2% Cloths 1 Application(s) Topical <User Schedule>  cyanocobalamin Injectable 1000 MICROGram(s) IntraMuscular daily  heparin   Injectable 5000 Unit(s) SubCutaneous every 8 hours  levothyroxine 175 MICROGram(s) Oral daily  methadone    Tablet 10 milliGRAM(s) Oral two times a day  midodrine. 2.5 milliGRAM(s) Oral three times a day  piperacillin/tazobactam IVPB.. 3.375 Gram(s) IV Intermittent every 8 hours    MEDICATIONS  (PRN):  acetaminophen     Tablet .. 650 milliGRAM(s) Oral every 6 hours PRN Mild Pain (1 - 3), Moderate Pain (4 - 6)  acetaminophen     Tablet .. 650 milliGRAM(s) Oral every 6 hours PRN Temp greater or equal to 38C (100.4F)  albuterol    90 MICROgram(s) HFA Inhaler 2 Puff(s) Inhalation every 6 hours PRN Shortness of Breath and/or Wheezing  ALPRAZolam 1 milliGRAM(s) Oral every 12 hours PRN anxiety  guaiFENesin Oral Liquid (Sugar-Free) 100 milliGRAM(s) Oral every 6 hours PRN Cough      RADIOLOGY & ADDITIONAL TESTS:

## 2023-09-18 ENCOUNTER — TRANSCRIPTION ENCOUNTER (OUTPATIENT)
Age: 73
End: 2023-09-18

## 2023-09-18 LAB
ALBUMIN SERPL ELPH-MCNC: 2.6 G/DL — LOW (ref 3.3–5.2)
ALP SERPL-CCNC: 66 U/L — SIGNIFICANT CHANGE UP (ref 40–120)
ALT FLD-CCNC: <5 U/L — SIGNIFICANT CHANGE UP
ANION GAP SERPL CALC-SCNC: 15 MMOL/L — SIGNIFICANT CHANGE UP (ref 5–17)
ANISOCYTOSIS BLD QL: SLIGHT — SIGNIFICANT CHANGE UP
AST SERPL-CCNC: 10 U/L — SIGNIFICANT CHANGE UP
BASOPHILS # BLD AUTO: 0 K/UL — SIGNIFICANT CHANGE UP (ref 0–0.2)
BASOPHILS NFR BLD AUTO: 0 % — SIGNIFICANT CHANGE UP (ref 0–2)
BILIRUB SERPL-MCNC: 0.4 MG/DL — SIGNIFICANT CHANGE UP (ref 0.4–2)
BUN SERPL-MCNC: 6.6 MG/DL — LOW (ref 8–20)
BURR CELLS BLD QL SMEAR: PRESENT — SIGNIFICANT CHANGE UP
CALCIUM SERPL-MCNC: 9.2 MG/DL — SIGNIFICANT CHANGE UP (ref 8.4–10.5)
CHLORIDE SERPL-SCNC: 100 MMOL/L — SIGNIFICANT CHANGE UP (ref 96–108)
CO2 SERPL-SCNC: 20 MMOL/L — LOW (ref 22–29)
CREAT SERPL-MCNC: 0.74 MG/DL — SIGNIFICANT CHANGE UP (ref 0.5–1.3)
CULTURE RESULTS: SIGNIFICANT CHANGE UP
CULTURE RESULTS: SIGNIFICANT CHANGE UP
EGFR: 94 ML/MIN/1.73M2 — SIGNIFICANT CHANGE UP
ELLIPTOCYTES BLD QL SMEAR: SLIGHT — SIGNIFICANT CHANGE UP
EOSINOPHIL # BLD AUTO: 0.14 K/UL — SIGNIFICANT CHANGE UP (ref 0–0.5)
EOSINOPHIL NFR BLD AUTO: 1.7 % — SIGNIFICANT CHANGE UP (ref 0–6)
GLUCOSE SERPL-MCNC: 89 MG/DL — SIGNIFICANT CHANGE UP (ref 70–99)
HCT VFR BLD CALC: 38.8 % — LOW (ref 39–50)
HGB BLD-MCNC: 12.3 G/DL — LOW (ref 13–17)
LYMPHOCYTES # BLD AUTO: 0.36 K/UL — LOW (ref 1–3.3)
LYMPHOCYTES # BLD AUTO: 4.3 % — LOW (ref 13–44)
MAGNESIUM SERPL-MCNC: 1.9 MG/DL — SIGNIFICANT CHANGE UP (ref 1.6–2.6)
MANUAL SMEAR VERIFICATION: SIGNIFICANT CHANGE UP
MCHC RBC-ENTMCNC: 26.8 PG — LOW (ref 27–34)
MCHC RBC-ENTMCNC: 31.7 GM/DL — LOW (ref 32–36)
MCV RBC AUTO: 84.5 FL — SIGNIFICANT CHANGE UP (ref 80–100)
MONOCYTES # BLD AUTO: 0.44 K/UL — SIGNIFICANT CHANGE UP (ref 0–0.9)
MONOCYTES NFR BLD AUTO: 5.2 % — SIGNIFICANT CHANGE UP (ref 2–14)
MYELOCYTES NFR BLD: 0.9 % — HIGH (ref 0–0)
NEUTROPHILS # BLD AUTO: 7.38 K/UL — SIGNIFICANT CHANGE UP (ref 1.8–7.4)
NEUTROPHILS NFR BLD AUTO: 87 % — HIGH (ref 43–77)
PLAT MORPH BLD: NORMAL — SIGNIFICANT CHANGE UP
PLATELET # BLD AUTO: 238 K/UL — SIGNIFICANT CHANGE UP (ref 150–400)
POIKILOCYTOSIS BLD QL AUTO: SIGNIFICANT CHANGE UP
POLYCHROMASIA BLD QL SMEAR: SIGNIFICANT CHANGE UP
POTASSIUM SERPL-MCNC: 4 MMOL/L — SIGNIFICANT CHANGE UP (ref 3.5–5.3)
POTASSIUM SERPL-SCNC: 4 MMOL/L — SIGNIFICANT CHANGE UP (ref 3.5–5.3)
PROT SERPL-MCNC: 6.1 G/DL — LOW (ref 6.6–8.7)
RBC # BLD: 4.59 M/UL — SIGNIFICANT CHANGE UP (ref 4.2–5.8)
RBC # FLD: 15 % — HIGH (ref 10.3–14.5)
RBC BLD AUTO: ABNORMAL
SODIUM SERPL-SCNC: 135 MMOL/L — SIGNIFICANT CHANGE UP (ref 135–145)
SPECIMEN SOURCE: SIGNIFICANT CHANGE UP
SPECIMEN SOURCE: SIGNIFICANT CHANGE UP
T3 SERPL-MCNC: 78 NG/DL — LOW (ref 80–200)
T4 AB SER-ACNC: 8.4 UG/DL — SIGNIFICANT CHANGE UP (ref 4.5–12)
VARIANT LYMPHS # BLD: 0.9 % — SIGNIFICANT CHANGE UP (ref 0–6)
WBC # BLD: 8.48 K/UL — SIGNIFICANT CHANGE UP (ref 3.8–10.5)
WBC # FLD AUTO: 8.48 K/UL — SIGNIFICANT CHANGE UP (ref 3.8–10.5)

## 2023-09-18 PROCEDURE — 99232 SBSQ HOSP IP/OBS MODERATE 35: CPT

## 2023-09-18 RX ORDER — THIAMINE MONONITRATE (VIT B1) 100 MG
500 TABLET ORAL EVERY 8 HOURS
Refills: 0 | Status: DISCONTINUED | OUTPATIENT
Start: 2023-09-18 | End: 2023-09-22

## 2023-09-18 RX ADMIN — Medication 105 MILLIGRAM(S): at 16:43

## 2023-09-18 RX ADMIN — Medication 175 MICROGRAM(S): at 06:13

## 2023-09-18 RX ADMIN — PREGABALIN 1000 MICROGRAM(S): 225 CAPSULE ORAL at 11:48

## 2023-09-18 RX ADMIN — PIPERACILLIN AND TAZOBACTAM 25 GRAM(S): 4; .5 INJECTION, POWDER, LYOPHILIZED, FOR SOLUTION INTRAVENOUS at 16:43

## 2023-09-18 RX ADMIN — HEPARIN SODIUM 5000 UNIT(S): 5000 INJECTION INTRAVENOUS; SUBCUTANEOUS at 22:15

## 2023-09-18 RX ADMIN — Medication 105 MILLIGRAM(S): at 22:15

## 2023-09-18 RX ADMIN — Medication 81 MILLIGRAM(S): at 11:48

## 2023-09-18 RX ADMIN — PIPERACILLIN AND TAZOBACTAM 25 GRAM(S): 4; .5 INJECTION, POWDER, LYOPHILIZED, FOR SOLUTION INTRAVENOUS at 01:03

## 2023-09-18 RX ADMIN — HEPARIN SODIUM 5000 UNIT(S): 5000 INJECTION INTRAVENOUS; SUBCUTANEOUS at 13:44

## 2023-09-18 RX ADMIN — HEPARIN SODIUM 5000 UNIT(S): 5000 INJECTION INTRAVENOUS; SUBCUTANEOUS at 06:13

## 2023-09-18 RX ADMIN — CHLORHEXIDINE GLUCONATE 1 APPLICATION(S): 213 SOLUTION TOPICAL at 06:14

## 2023-09-18 RX ADMIN — PIPERACILLIN AND TAZOBACTAM 25 GRAM(S): 4; .5 INJECTION, POWDER, LYOPHILIZED, FOR SOLUTION INTRAVENOUS at 08:58

## 2023-09-18 NOTE — DISCHARGE NOTE PROVIDER - NSDCCPCAREPLAN_GEN_ALL_CORE_FT
PRINCIPAL DISCHARGE DIAGNOSIS  Diagnosis: Sepsis with acute renal failure  Assessment and Plan of Treatment: Due to pneumonia   Complete course of antibiotics      SECONDARY DISCHARGE DIAGNOSES  Diagnosis: Hypotension  Assessment and Plan of Treatment: Resolved s/p IVF    Diagnosis: Acute respiratory failure with hypoxia  Assessment and Plan of Treatment: Improved    Diagnosis: Metabolic encephalopathy  Assessment and Plan of Treatment: Resolved    Diagnosis: Lung nodule  Assessment and Plan of Treatment: CT of the head noted a 3 x 1.5 x 0.7 cm pleural-based nodule in the right lung apex  Outpatient follow up with PMD on discharge for further management    Diagnosis: VI (acute kidney injury)  Assessment and Plan of Treatment: Resolved

## 2023-09-18 NOTE — DISCHARGE NOTE PROVIDER - NSDCMRMEDTOKEN_GEN_ALL_CORE_FT
ALPRAZolam 1 mg oral tablet: 1 tab(s) orally 2 times a day last filled 9/12/23 x 30 days  aspirin 81 mg oral tablet: 1 tab(s) orally once a day  enalapril 2.5 mg oral tablet: 1 tab(s) orally once a day  gabapentin 600 mg oral tablet: 1 tab(s) orally 4 times a day  levothyroxine 175 mcg (0.175 mg) oral capsule: 1 cap(s) orally once a day  methadone 10 mg oral tablet: 1 tab(s) orally every 8 hours last filled 9/12/23 x 30 days  oxyBUTYnin 10 mg/24 hr oral tablet, extended release: 1 tab(s) orally 2 times a day  rosuvastatin 10 mg oral tablet: 1 tab(s) orally once a day (at bedtime)   ALPRAZolam 1 mg oral tablet: 1 tab(s) orally 2 times a day last filled 9/12/23 x 30 days  aspirin 81 mg oral tablet: 1 tab(s) orally once a day  atorvastatin 40 mg oral tablet: 1 tab(s) orally once a day (at bedtime)  cyanocobalamin 1000 mcg oral tablet: 1 tab(s) orally once a day  enalapril 2.5 mg oral tablet: 1 tab(s) orally once a day  levothyroxine 150 mcg (0.15 mg) oral tablet: 1 tab(s) orally once a day  methadone 10 mg oral tablet: 1 tab(s) orally 2 times a day last filled 9/12/23 x 30 days  ocular lubricant ophthalmic solution: 1 drop(s) to each affected eye every 6 hours  oxyBUTYnin 10 mg/24 hr oral tablet, extended release: 1 tab(s) orally 2 times a day  thiamine 100 mg oral tablet: 1 tab(s) orally once a day

## 2023-09-18 NOTE — DISCHARGE NOTE PROVIDER - ATTENDING DISCHARGE PHYSICAL EXAMINATION:
GENERAL: Elderly male looking confused  HEENT: right conjunctival injection   NECK: soft, Supple, No JVD  CHEST/LUNG: CTA bilaterally; No wheezing  HEART: S1S2+, Regular rate and rhythm; No murmurs  ABDOMEN: Soft, Nontender, Nondistended; Bowel sounds present  EXTREMITIES:  1+ Peripheral Pulses, No edema  SKIN: No rashes or lesions  NEURO: Confused, not participating in exam

## 2023-09-18 NOTE — PHYSICAL THERAPY INITIAL EVALUATION ADULT - MANUAL MUSCLE TESTING RESULTS, REHAB EVAL
cognition. pt. with difficulty following instructions for formal testing. At least 3/5 throguhout based on observation/grossly assessed due to

## 2023-09-18 NOTE — PHYSICAL THERAPY INITIAL EVALUATION ADULT - PERTINENT HX OF CURRENT PROBLEM, REHAB EVAL
73M with PMHx of LLE lymphedema, hypothyroidism, HFpEF, SVT, LBBB, right pleural effusion, COPD, DDD, and a recent admission in June 2023 for CAP, presents to the ED after exhibiting altered mentation. His niece reported that he was difficult to arouse and later on in the day was found to be wandering around his apartment confused. On presentation to the ED he was found to be hypoxemic and hypotensive despite 4L crystalloid and was started on IV vasopressor therapy. CT chest revealed worsening multifocal ground glass opacities, septal thickening, and a pleural-based nodule in the right lung apex.

## 2023-09-18 NOTE — DISCHARGE NOTE PROVIDER - NSDCFUSCHEDAPPT_GEN_ALL_CORE_FT
[facility]  [address]  Scheduled Appointment: 09/21/2023    Giuseppe Carter  [facility]  [address]  Scheduled Appointment: 09/21/2023    [facility]  [address]  Scheduled Appointment: 09/28/2023    Giuseppe Carter  [facility]  [address]  Scheduled Appointment: 09/28/2023     [facility]  Scheduled Appointment: 09/28/2023    Giuseppe Carter  [place] [id number] [address]  Scheduled Appointment: 09/28/2023

## 2023-09-18 NOTE — PROGRESS NOTE ADULT - ASSESSMENT
73M with PMHx of LLE lymphedema, hypothyroidism, HFpEF, SVT, LBBB, right pleural effusion, COPD, DDD, and a recent admission in June 2023 for CAP, presents to the ED after exhibiting altered mentation. His niece reported that he was difficult to arouse and later on in the day was found to be wandering around his apartment confused. On presentation to the ED he was found to be hypoxemic and hypotensive despite 4L crystalloid and was started on IV vasopressor therapy. CT chest revealed worsening multifocal ground glass opacities, septal thickening, and a pleural-based nodule in the right lung apex.    Metabolic encephalopathy  - Secondary to hypoxic respiratory failure and pneumonia  - CT of the head was without acute intracranial pathology  - at baseline    Acute hypoxic respiratory failure  - Hypoxia improved,   - On intravenous antibiotics for pneumonia  - V/Q scan with very low probability for pulmonary embolus    Sepsis / Pneumonia  - On piperacillin tazobactam  - Vasopressor support was weaned,  - Urine and blood cultures were without growth    Lung nodule  - CT of the head noted a 3 x 1.5 x 0.7 cm pleural-based nodule in the right lung apex    Acute kidney injury  - Renal function improved on repeat studies    T11 compression fracture  - The patient reported an episode of fall last month  - Neurosurgery consultation noted  - No surgical intervention recommended, family to bring in the back brace  - Analgesic medications as needed  - taper methadone, unclear why started in icu    Hypothyroidism  - On levothyroxine  - send thyroid panel   - tsh suppressed    Peroneal vein thrombosis  - Below knee thrombus without proximal propagation  - Not started on anticoagulation    thiamine for suspected def- thiamine q8    vit b12 def -  cyanocobalamin      dispo - pt consult

## 2023-09-18 NOTE — PHYSICAL THERAPY INITIAL EVALUATION ADULT - ADDITIONAL COMMENTS
Pt. was unable to provide further information re: social/functional status due to confusion. As per previous PT ecval 6/2023 Pt. lives alone and was independent PTA. uses on occasion a SAC and RW, 5 steps 1 rail to enter home, no stairs within home

## 2023-09-18 NOTE — DISCHARGE NOTE PROVIDER - HOSPITAL COURSE
73M with PMHx of LLE lymphedema, hypothyroidism, HFpEF, SVT, LBBB, right pleural effusion, COPD, DDD, and a recent admission in June 2023 for CAP, presents to the ED after exhibiting altered mentation. His niece reported that he was difficult to arouse and later on in the day was found to be wandering around his apartment confused. On presentation to the ED he was found to be hypoxemic and hypotensive despite 4L crystalloid and was started on IV vasopressor therapy. CT chest revealed worsening multifocal ground glass opacities, septal thickening, and a pleural-based nodule in the right lung apex. Pt was admitted with metabolic encephalopathy 2/2 to hypoxic respiratory failure and pneumonia. CT of the head was negative. Pt improved s/p IV abx and supplemental O2. V/Q scan with very low probability for pulmonary embolus. IV vasopressor thearpy was weaned to PO midodrine. Incidentally found with a lunch nodule.. CT of the head noted a 3 x 1.5 x 0.7 cm pleural-based nodule in the right lung apex and peroneal vein thrombosis not started on AC. Will need further f/u outpatient with PMD.    73M with PMHx of LLE lymphedema, hypothyroidism, HFpEF, SVT, LBBB, right pleural effusion, COPD, DDD, and a recent admission in June 2023 for CAP, presents to the ED after exhibiting altered mentation. His niece reported that he was difficult to arouse and later on in the day was found to be wandering around his apartment confused. On presentation to the ED he was found to be hypoxemic and hypotensive despite 4L crystalloid and was started on IV vasopressor therapy. CT chest revealed worsening multifocal ground glass opacities, septal thickening, and a pleural-based nodule in the right lung apex.    Plan:     Metabolic encephalopathy:   - Secondary to hypoxic respiratory failure and pneumonia  - CT of the head was without acute intracranial pathology     mri head showed mild ischemia   neuro cleared patient  - dc with thiamine    Acute hypoxic respiratory failure:   - Hypoxia improved   - On intravenous antibiotics for pneumonia  - V/Q scan with very low probability for pulmonary embolus    Sepsis / Pneumonia:   - On piperacillin tazobactam completed  - Urine and blood cultures were without growth    Lung nodule:  - CT of the head noted a 3 x 1.5 x 0.7 cm pleural-based nodule in the right lung apex  has outpatient onc appt    Acute kidney injury:   - Renal function improved on repeat studies    T11 compression fracture  - The patient reported an episode of fall last month  - Neurosurgery consultation noted  - No surgical intervention recommended,   - Analgesic medications as needed    Hypothyroidism  - On levothyroxine  - tsh suppressed, dose of levothyroxine was decrease from 175 to 150 mcgs    Peroneal vein thrombosis:   - Below knee thrombus without proximal propagation  - Not started on anticoagulation  -repeat US done showed stable thrombus, would have patient follow with vascular out patient to get US repeated in 1 month to see for any worsening.     vit b12 def - dc with po b12    Right red eye: Will give antihistamine drops, will wash eye as well.

## 2023-09-18 NOTE — PROGRESS NOTE ADULT - SUBJECTIVE AND OBJECTIVE BOX
BLAS SUAZO    [id number]    73y      Male    INTERVAL HPI/OVERNIGHT EVENTS:  patient being seen for ams. patient seen at bedside and denies any complaints,, patient is mildy confused.       REVIEW OF SYSTEMS:    CONSTITUTIONAL: No fever, weight loss, or fatigue  RESPIRATORY: No cough, wheezing, hemoptysis; No shortness of breath  CARDIOVASCULAR: No chest pain, palpitations  GASTROINTESTINAL: No abdominal or epigastric pain. No nausea, vomiting  NEUROLOGICAL: No headaches, memory loss, loss of strength.  MISCELLANEOUS:      Vital Signs Last 24 Hrs  T(C): 36.6 (18 Sep 2023 11:07), Max: 36.7 (18 Sep 2023 06:04)  T(F): 97.9 (18 Sep 2023 11:07), Max: 98 (18 Sep 2023 06:04)  HR: 90 (18 Sep 2023 11:07) (90 - 93)  BP: 155/92 (18 Sep 2023 11:07) (136/80 - 158/78)  BP(mean): --  RR: 19 (18 Sep 2023 11:07) (18 - 19)  SpO2: 95% (18 Sep 2023 11:07) (93% - 95%)    Parameters below as of 18 Sep 2023 11:07  Patient On (Oxygen Delivery Method): room air        PHYSICAL EXAM:  General appearance: No acute distress, Awake, Alert  HEENT: Normocephalic, Atraumatic, Conjunctiva clear, EOMI  Neck: Supple, No JVD, No tenderness  Lungs: Breath sound equal bilaterally, No wheezes, Mild basilar rales  Cardiovascular: S1S2, Regular rhythm  Abdomen: Soft, Nontender, Nondistended, No guarding/rebound, Positive bowel sounds  Extremities: No clubbing, No cyanosis, No calf tenderness, left lower extremity edema  Neuro: Strength equal bilaterally, No tremors  Psychiatric: Appropriate mood, Normal affect        LABS:                        12.3   8.48  )-----------( 238      ( 18 Sep 2023 04:33 )             38.8     09-18    135  |  100  |  6.6<L>  ----------------------------<  89  4.0   |  20.0<L>  |  0.74    Ca    9.2      18 Sep 2023 04:33  Mg     1.9     09-18    TPro  6.1<L>  /  Alb  2.6<L>  /  TBili  0.4  /  DBili  x   /  AST  10  /  ALT  <5  /  AlkPhos  66  09-18      Urinalysis Basic - ( 18 Sep 2023 04:33 )    Color: x / Appearance: x / SG: x / pH: x  Gluc: 89 mg/dL / Ketone: x  / Bili: x / Urobili: x   Blood: x / Protein: x / Nitrite: x   Leuk Esterase: x / RBC: x / WBC x   Sq Epi: x / Non Sq Epi: x / Bacteria: x        \\  MEDICATIONS  (STANDING):  aspirin  chewable 81 milliGRAM(s) Oral daily  chlorhexidine 2% Cloths 1 Application(s) Topical <User Schedule>  cyanocobalamin Injectable 1000 MICROGram(s) IntraMuscular daily  heparin   Injectable 5000 Unit(s) SubCutaneous every 8 hours  levothyroxine 175 MICROGram(s) Oral daily  piperacillin/tazobactam IVPB.. 3.375 Gram(s) IV Intermittent every 8 hours  thiamine IVPB 500 milliGRAM(s) IV Intermittent every 8 hours    MEDICATIONS  (PRN):  acetaminophen     Tablet .. 650 milliGRAM(s) Oral every 6 hours PRN Temp greater or equal to 38C (100.4F)  acetaminophen     Tablet .. 650 milliGRAM(s) Oral every 6 hours PRN Mild Pain (1 - 3), Moderate Pain (4 - 6)  albuterol    90 MICROgram(s) HFA Inhaler 2 Puff(s) Inhalation every 6 hours PRN Shortness of Breath and/or Wheezing  ALPRAZolam 1 milliGRAM(s) Oral every 12 hours PRN anxiety  guaiFENesin Oral Liquid (Sugar-Free) 100 milliGRAM(s) Oral every 6 hours PRN Cough      RADIOLOGY & ADDITIONAL TESTS:

## 2023-09-19 PROCEDURE — 99232 SBSQ HOSP IP/OBS MODERATE 35: CPT

## 2023-09-19 RX ORDER — LEVOTHYROXINE SODIUM 125 MCG
150 TABLET ORAL DAILY
Refills: 0 | Status: DISCONTINUED | OUTPATIENT
Start: 2023-09-19 | End: 2023-09-22

## 2023-09-19 RX ORDER — ALPRAZOLAM 0.25 MG
0.5 TABLET ORAL
Refills: 0 | Status: DISCONTINUED | OUTPATIENT
Start: 2023-09-19 | End: 2023-09-19

## 2023-09-19 RX ORDER — ALPRAZOLAM 0.25 MG
0.25 TABLET ORAL
Refills: 0 | Status: DISCONTINUED | OUTPATIENT
Start: 2023-09-19 | End: 2023-09-22

## 2023-09-19 RX ADMIN — PIPERACILLIN AND TAZOBACTAM 25 GRAM(S): 4; .5 INJECTION, POWDER, LYOPHILIZED, FOR SOLUTION INTRAVENOUS at 17:08

## 2023-09-19 RX ADMIN — PIPERACILLIN AND TAZOBACTAM 25 GRAM(S): 4; .5 INJECTION, POWDER, LYOPHILIZED, FOR SOLUTION INTRAVENOUS at 09:00

## 2023-09-19 RX ADMIN — HEPARIN SODIUM 5000 UNIT(S): 5000 INJECTION INTRAVENOUS; SUBCUTANEOUS at 14:59

## 2023-09-19 RX ADMIN — Medication 150 MICROGRAM(S): at 17:40

## 2023-09-19 RX ADMIN — PREGABALIN 1000 MICROGRAM(S): 225 CAPSULE ORAL at 13:02

## 2023-09-19 RX ADMIN — Medication 0.25 MILLIGRAM(S): at 17:08

## 2023-09-19 RX ADMIN — PIPERACILLIN AND TAZOBACTAM 25 GRAM(S): 4; .5 INJECTION, POWDER, LYOPHILIZED, FOR SOLUTION INTRAVENOUS at 00:48

## 2023-09-19 RX ADMIN — Medication 175 MICROGRAM(S): at 05:38

## 2023-09-19 RX ADMIN — HEPARIN SODIUM 5000 UNIT(S): 5000 INJECTION INTRAVENOUS; SUBCUTANEOUS at 05:39

## 2023-09-19 RX ADMIN — Medication 105 MILLIGRAM(S): at 14:59

## 2023-09-19 RX ADMIN — HEPARIN SODIUM 5000 UNIT(S): 5000 INJECTION INTRAVENOUS; SUBCUTANEOUS at 21:39

## 2023-09-19 RX ADMIN — Medication 81 MILLIGRAM(S): at 12:04

## 2023-09-19 RX ADMIN — Medication 105 MILLIGRAM(S): at 05:38

## 2023-09-19 RX ADMIN — Medication 105 MILLIGRAM(S): at 21:39

## 2023-09-19 RX ADMIN — CHLORHEXIDINE GLUCONATE 1 APPLICATION(S): 213 SOLUTION TOPICAL at 05:40

## 2023-09-19 NOTE — PROGRESS NOTE ADULT - SUBJECTIVE AND OBJECTIVE BOX
BLAS SUAZO    [id number]    73y      Male    INTERVAL HPI/OVERNIGHT EVENTS:   patient being seen fro ams. patietn seen at bedside and is mildly confused but follows commands       REVIEW OF SYSTEMS:    CONSTITUTIONAL: No fever, weight loss, or fatigue  RESPIRATORY: No cough, wheezing, hemoptysis; No shortness of breath  CARDIOVASCULAR: No chest pain, palpitations  GASTROINTESTINAL: No abdominal or epigastric pain. No nausea, vomiting  NEUROLOGICAL: No headaches, memory loss, loss of strength.  MISCELLANEOUS:      Vital Signs Last 24 Hrs  T(C): 36.4 (19 Sep 2023 10:38), Max: 36.6 (18 Sep 2023 16:50)  T(F): 97.5 (19 Sep 2023 10:38), Max: 97.9 (19 Sep 2023 05:15)  HR: 99 (19 Sep 2023 10:38) (92 - 103)  BP: 141/92 (19 Sep 2023 10:38) (134/79 - 154/90)  BP(mean): --  RR: 18 (19 Sep 2023 10:38) (18 - 18)  SpO2: 94% (19 Sep 2023 10:38) (92% - 96%)    Parameters below as of 19 Sep 2023 10:38  Patient On (Oxygen Delivery Method): room air        PHYSICAL EXAM:  General appearance: No acute distress, Awake,  HEENT: Normocephalic, Atraumatic, Conjunctiva clear, EOMI  Neck: Supple, No JVD, No tenderness  Lungs: Breath sound equal bilaterally, No wheezes, Mild basilar rales  Cardiovascular: S1S2, Regular rhythm  Abdomen: Soft, Nontender, Nondistended, No guarding/rebound, Positive bowel sounds  Extremities: No clubbing, No cyanosis, No calf tenderness, left lower extremity edema  Neuro: Strength equal bilaterally, No tremors, aao x2  Psychiatric: Appropriate mood, Normal affect        LABS:                        12.3   8.48  )-----------( 238      ( 18 Sep 2023 04:33 )             38.8     09-18    135  |  100  |  6.6<L>  ----------------------------<  89  4.0   |  20.0<L>  |  0.74    Ca    9.2      18 Sep 2023 04:33  Mg     1.9     09-18    TPro  6.1<L>  /  Alb  2.6<L>  /  TBili  0.4  /  DBili  x   /  AST  10  /  ALT  <5  /  AlkPhos  66  09-18      Urinalysis Basic - ( 18 Sep 2023 04:33 )    Color: x / Appearance: x / SG: x / pH: x  Gluc: 89 mg/dL / Ketone: x  / Bili: x / Urobili: x   Blood: x / Protein: x / Nitrite: x   Leuk Esterase: x / RBC: x / WBC x   Sq Epi: x / Non Sq Epi: x / Bacteria: x          MEDICATIONS  (STANDING):  aspirin  chewable 81 milliGRAM(s) Oral daily  chlorhexidine 2% Cloths 1 Application(s) Topical <User Schedule>  cyanocobalamin Injectable 1000 MICROGram(s) IntraMuscular daily  heparin   Injectable 5000 Unit(s) SubCutaneous every 8 hours  levothyroxine 150 MICROGram(s) Oral daily  piperacillin/tazobactam IVPB.. 3.375 Gram(s) IV Intermittent every 8 hours  thiamine IVPB 500 milliGRAM(s) IV Intermittent every 8 hours    MEDICATIONS  (PRN):  acetaminophen     Tablet .. 650 milliGRAM(s) Oral every 6 hours PRN Mild Pain (1 - 3), Moderate Pain (4 - 6)  acetaminophen     Tablet .. 650 milliGRAM(s) Oral every 6 hours PRN Temp greater or equal to 38C (100.4F)  albuterol    90 MICROgram(s) HFA Inhaler 2 Puff(s) Inhalation every 6 hours PRN Shortness of Breath and/or Wheezing  ALPRAZolam 1 milliGRAM(s) Oral every 12 hours PRN anxiety  guaiFENesin Oral Liquid (Sugar-Free) 100 milliGRAM(s) Oral every 6 hours PRN Cough      RADIOLOGY & ADDITIONAL TESTS:

## 2023-09-19 NOTE — PROGRESS NOTE ADULT - ASSESSMENT
73M with PMHx of LLE lymphedema, hypothyroidism, HFpEF, SVT, LBBB, right pleural effusion, COPD, DDD, and a recent admission in June 2023 for CAP, presents to the ED after exhibiting altered mentation. His niece reported that he was difficult to arouse and later on in the day was found to be wandering around his apartment confused. On presentation to the ED he was found to be hypoxemic and hypotensive despite 4L crystalloid and was started on IV vasopressor therapy. CT chest revealed worsening multifocal ground glass opacities, septal thickening, and a pleural-based nodule in the right lung apex.    Metabolic encephalopathy  - Secondary to hypoxic respiratory failure and pneumonia  - CT of the head was without acute intracranial pathology  - at baseline  - c.w iv thiamine    Acute hypoxic respiratory failure  - Hypoxia improved,   - On intravenous antibiotics for pneumonia  - V/Q scan with very low probability for pulmonary embolus    Sepsis / Pneumonia  - On piperacillin tazobactam  - Vasopressor support was weaned,  - Urine and blood cultures were without growth    Lung nodule  - CT of the head noted a 3 x 1.5 x 0.7 cm pleural-based nodule in the right lung apex    Acute kidney injury  - Renal function improved on repeat studies    T11 compression fracture  - The patient reported an episode of fall last month  - Neurosurgery consultation noted  - No surgical intervention recommended,   - Analgesic medications as needed  - taper methadone, unclear why started in icu    Hypothyroidism  - On levothyroxine  - tsh suppressed    Peroneal vein thrombosis  - Below knee thrombus without proximal propagation  - Not started on anticoagulation    vit b12 def -  cyanocobalamin      dispo - pt consult   needs pavithra

## 2023-09-20 LAB
ALBUMIN SERPL ELPH-MCNC: 2.7 G/DL — LOW (ref 3.3–5.2)
ALP SERPL-CCNC: 63 U/L — SIGNIFICANT CHANGE UP (ref 40–120)
ALT FLD-CCNC: <5 U/L — SIGNIFICANT CHANGE UP
AMPHET UR-MCNC: NEGATIVE — SIGNIFICANT CHANGE UP
ANION GAP SERPL CALC-SCNC: 14 MMOL/L — SIGNIFICANT CHANGE UP (ref 5–17)
AST SERPL-CCNC: 7 U/L — SIGNIFICANT CHANGE UP
BARBITURATES UR SCN-MCNC: NEGATIVE — SIGNIFICANT CHANGE UP
BENZODIAZ UR-MCNC: POSITIVE
BILIRUB SERPL-MCNC: 0.3 MG/DL — LOW (ref 0.4–2)
BUN SERPL-MCNC: 8 MG/DL — SIGNIFICANT CHANGE UP (ref 8–20)
CALCIUM SERPL-MCNC: 9.2 MG/DL — SIGNIFICANT CHANGE UP (ref 8.4–10.5)
CHLORIDE SERPL-SCNC: 102 MMOL/L — SIGNIFICANT CHANGE UP (ref 96–108)
CO2 SERPL-SCNC: 20 MMOL/L — LOW (ref 22–29)
COCAINE METAB.OTHER UR-MCNC: NEGATIVE — SIGNIFICANT CHANGE UP
CREAT SERPL-MCNC: 0.74 MG/DL — SIGNIFICANT CHANGE UP (ref 0.5–1.3)
EGFR: 94 ML/MIN/1.73M2 — SIGNIFICANT CHANGE UP
GLUCOSE SERPL-MCNC: 99 MG/DL — SIGNIFICANT CHANGE UP (ref 70–99)
MAGNESIUM SERPL-MCNC: 1.8 MG/DL — SIGNIFICANT CHANGE UP (ref 1.6–2.6)
METHADONE UR-MCNC: POSITIVE
OPIATES UR-MCNC: NEGATIVE — SIGNIFICANT CHANGE UP
PCP SPEC-MCNC: SIGNIFICANT CHANGE UP
PCP UR-MCNC: NEGATIVE — SIGNIFICANT CHANGE UP
POTASSIUM SERPL-MCNC: 3.7 MMOL/L — SIGNIFICANT CHANGE UP (ref 3.5–5.3)
POTASSIUM SERPL-SCNC: 3.7 MMOL/L — SIGNIFICANT CHANGE UP (ref 3.5–5.3)
PROT SERPL-MCNC: 5.9 G/DL — LOW (ref 6.6–8.7)
SODIUM SERPL-SCNC: 136 MMOL/L — SIGNIFICANT CHANGE UP (ref 135–145)
THC UR QL: NEGATIVE — SIGNIFICANT CHANGE UP

## 2023-09-20 PROCEDURE — 99232 SBSQ HOSP IP/OBS MODERATE 35: CPT

## 2023-09-20 RX ADMIN — Medication 0.25 MILLIGRAM(S): at 05:15

## 2023-09-20 RX ADMIN — Medication 105 MILLIGRAM(S): at 22:03

## 2023-09-20 RX ADMIN — Medication 81 MILLIGRAM(S): at 11:23

## 2023-09-20 RX ADMIN — HEPARIN SODIUM 5000 UNIT(S): 5000 INJECTION INTRAVENOUS; SUBCUTANEOUS at 05:15

## 2023-09-20 RX ADMIN — Medication 105 MILLIGRAM(S): at 13:30

## 2023-09-20 RX ADMIN — HEPARIN SODIUM 5000 UNIT(S): 5000 INJECTION INTRAVENOUS; SUBCUTANEOUS at 22:03

## 2023-09-20 RX ADMIN — Medication 0.25 MILLIGRAM(S): at 17:03

## 2023-09-20 RX ADMIN — PIPERACILLIN AND TAZOBACTAM 25 GRAM(S): 4; .5 INJECTION, POWDER, LYOPHILIZED, FOR SOLUTION INTRAVENOUS at 01:24

## 2023-09-20 RX ADMIN — PIPERACILLIN AND TAZOBACTAM 25 GRAM(S): 4; .5 INJECTION, POWDER, LYOPHILIZED, FOR SOLUTION INTRAVENOUS at 17:05

## 2023-09-20 RX ADMIN — Medication 150 MICROGRAM(S): at 05:15

## 2023-09-20 RX ADMIN — Medication 105 MILLIGRAM(S): at 05:16

## 2023-09-20 RX ADMIN — PREGABALIN 1000 MICROGRAM(S): 225 CAPSULE ORAL at 11:24

## 2023-09-20 RX ADMIN — CHLORHEXIDINE GLUCONATE 1 APPLICATION(S): 213 SOLUTION TOPICAL at 05:16

## 2023-09-20 RX ADMIN — PIPERACILLIN AND TAZOBACTAM 25 GRAM(S): 4; .5 INJECTION, POWDER, LYOPHILIZED, FOR SOLUTION INTRAVENOUS at 09:16

## 2023-09-20 RX ADMIN — HEPARIN SODIUM 5000 UNIT(S): 5000 INJECTION INTRAVENOUS; SUBCUTANEOUS at 13:30

## 2023-09-20 NOTE — DIETITIAN INITIAL EVALUATION ADULT - PERTINENT MEDS FT
MEDICATIONS  (STANDING):  ALPRAZolam 0.25 milliGRAM(s) Oral two times a day  aspirin  chewable 81 milliGRAM(s) Oral daily  chlorhexidine 2% Cloths 1 Application(s) Topical <User Schedule>  cyanocobalamin Injectable 1000 MICROGram(s) IntraMuscular daily  heparin   Injectable 5000 Unit(s) SubCutaneous every 8 hours  levothyroxine 150 MICROGram(s) Oral daily  piperacillin/tazobactam IVPB.. 3.375 Gram(s) IV Intermittent every 8 hours  thiamine IVPB 500 milliGRAM(s) IV Intermittent every 8 hours    MEDICATIONS  (PRN):  acetaminophen     Tablet .. 650 milliGRAM(s) Oral every 6 hours PRN Mild Pain (1 - 3), Moderate Pain (4 - 6)  acetaminophen     Tablet .. 650 milliGRAM(s) Oral every 6 hours PRN Temp greater or equal to 38C (100.4F)  albuterol    90 MICROgram(s) HFA Inhaler 2 Puff(s) Inhalation every 6 hours PRN Shortness of Breath and/or Wheezing  guaiFENesin Oral Liquid (Sugar-Free) 100 milliGRAM(s) Oral every 6 hours PRN Cough

## 2023-09-20 NOTE — DIETITIAN INITIAL EVALUATION ADULT - OTHER INFO
72 y/o M with a h/o LLE lymphedema, hypothyroidism, HFpEF, SVT, LBBB, right pleural effusion, COPD, DDD, recent admission in June 2023 for CAP, presents to the ED after exhibiting altered mentation earlier today in his apartment. His niece reported that he was difficult to arouse in the afternoon and later on in the day was found to be wandering around his apartment confused. He was found to be hypoxemic and hypotensive despite 4L crystalloid and was started on IV vasopressor therapy. CT chest reveals worsening multifocal ground glass opacities, septal thickening, and pleural-based nodule in the right lung apex. The patient does not report any current symptoms at this time and does not state that he felt out of the ordinary today, however he is a poor historian.

## 2023-09-20 NOTE — DIETITIAN INITIAL EVALUATION ADULT - PERTINENT LABORATORY DATA
09-20    136  |  102  |  8.0  ----------------------------<  99  3.7   |  20.0<L>  |  0.74    Ca    9.2      20 Sep 2023 07:26  Mg     1.8     09-20    TPro  5.9<L>  /  Alb  2.7<L>  /  TBili  0.3<L>  /  DBili  x   /  AST  7   /  ALT  <5  /  AlkPhos  63  09-20

## 2023-09-20 NOTE — PROGRESS NOTE ADULT - ASSESSMENT
73M with PMHx of LLE lymphedema, hypothyroidism, HFpEF, SVT, LBBB, right pleural effusion, COPD, DDD, and a recent admission in June 2023 for CAP, presents to the ED after exhibiting altered mentation. His niece reported that he was difficult to arouse and later on in the day was found to be wandering around his apartment confused. On presentation to the ED he was found to be hypoxemic and hypotensive despite 4L crystalloid and was started on IV vasopressor therapy. CT chest revealed worsening multifocal ground glass opacities, septal thickening, and a pleural-based nodule in the right lung apex.    Metabolic encephalopathy  - Secondary to hypoxic respiratory failure and pneumonia  - CT of the head was without acute intracranial pathology  mri head pending     Acute hypoxic respiratory failure  - Hypoxia improved,   - On intravenous antibiotics for pneumonia  - V/Q scan with very low probability for pulmonary embolus    Sepsis / Pneumonia  - On piperacillin tazobactam last day   - Vasopressor support was weaned,  - Urine and blood cultures were without growth    Lung nodule  - CT of the head noted a 3 x 1.5 x 0.7 cm pleural-based nodule in the right lung apex    Acute kidney injury  - Renal function improved on repeat studies    T11 compression fracture  - The patient reported an episode of fall last month  - Neurosurgery consultation noted  - No surgical intervention recommended,   - Analgesic medications as needed  -stop methadone    Hypothyroidism  - On levothyroxine  - tsh suppressed    Peroneal vein thrombosis  - Below knee thrombus without proximal propagation  - Not started on anticoagulation    vit b12 def -  cyanocobalamin    spoke to family friend yesterday

## 2023-09-20 NOTE — DIETITIAN INITIAL EVALUATION ADULT - ORAL INTAKE PTA/DIET HISTORY
Pt awakens to voice, rolls over and goes back to sleep, refusing nutrition interview, aware reports of confusion. Per documentation and observed plate waste Pt noted with decreased PO intake, completing ~50% of meals. Weights per HIE fluctuating noted, will continue to monitor.

## 2023-09-20 NOTE — DIETITIAN INITIAL EVALUATION ADULT - ADD RECOMMEND
Add Ensure HP/Enlive BID; Rx MVI daily; continue thiamine and vit B12 supplementation; Encourage HBV protein sources

## 2023-09-20 NOTE — PROGRESS NOTE ADULT - SUBJECTIVE AND OBJECTIVE BOX
BLAS SUAZO    [id number]    73y      Male    INTERVAL HPI/OVERNIGHT EVENTS: patient being seen for ams. patient seen at bedside and is pleasant , slow to answer.     patient is for mri today     REVIEW OF SYSTEMS:    CONSTITUTIONAL: No fever, weight loss, or fatigue  RESPIRATORY: No cough, wheezing, hemoptysis; No shortness of breath  CARDIOVASCULAR: No chest pain, palpitations  GASTROINTESTINAL: No abdominal or epigastric pain. No nausea, vomiting  NEUROLOGICAL: No headaches, memory loss, loss of strength.  MISCELLANEOUS:      Vital Signs Last 24 Hrs  T(C): 36.9 (20 Sep 2023 10:24), Max: 36.9 (20 Sep 2023 10:24)  T(F): 98.5 (20 Sep 2023 10:24), Max: 98.5 (20 Sep 2023 10:24)  HR: 65 (20 Sep 2023 10:24) (65 - 101)  BP: 146/84 (20 Sep 2023 10:24) (123/82 - 149/88)  BP(mean): --  RR: 18 (20 Sep 2023 10:24) (17 - 18)  SpO2: 93% (20 Sep 2023 10:24) (93% - 98%)    Parameters below as of 20 Sep 2023 04:58  Patient On (Oxygen Delivery Method): room air        PHYSICAL EXAM:  General appearance: No acute distress, Awake,  HEENT: Normocephalic, Atraumatic, Conjunctiva clear, EOMI  Neck: Supple, No JVD, No tenderness  Lungs: Breath sound equal bilaterally, No wheezes, Mild basilar rales  Cardiovascular: S1S2, Regular rhythm  Abdomen: Soft, Nontender, Nondistended, No guarding/rebound, Positive bowel sounds  Extremities: No clubbing, No cyanosis, No calf tenderness, left lower extremity edema  Neuro: Strength equal bilaterally, No tremors, aao x2  Psychiatric: Appropriate mood, Normal affect      LABS:    09-20    136  |  102  |  8.0  ----------------------------<  99  3.7   |  20.0<L>  |  0.74    Ca    9.2      20 Sep 2023 07:26  Mg     1.8     09-20    TPro  5.9<L>  /  Alb  2.7<L>  /  TBili  0.3<L>  /  DBili  x   /  AST  7   /  ALT  <5  /  AlkPhos  63  09-20      Urinalysis Basic - ( 20 Sep 2023 07:26 )    Color: x / Appearance: x / SG: x / pH: x  Gluc: 99 mg/dL / Ketone: x  / Bili: x / Urobili: x   Blood: x / Protein: x / Nitrite: x   Leuk Esterase: x / RBC: x / WBC x   Sq Epi: x / Non Sq Epi: x / Bacteria: x          MEDICATIONS  (STANDING):  ALPRAZolam 0.25 milliGRAM(s) Oral two times a day  aspirin  chewable 81 milliGRAM(s) Oral daily  chlorhexidine 2% Cloths 1 Application(s) Topical <User Schedule>  cyanocobalamin Injectable 1000 MICROGram(s) IntraMuscular daily  heparin   Injectable 5000 Unit(s) SubCutaneous every 8 hours  levothyroxine 150 MICROGram(s) Oral daily  piperacillin/tazobactam IVPB.. 3.375 Gram(s) IV Intermittent every 8 hours  thiamine IVPB 500 milliGRAM(s) IV Intermittent every 8 hours    MEDICATIONS  (PRN):  acetaminophen     Tablet .. 650 milliGRAM(s) Oral every 6 hours PRN Mild Pain (1 - 3), Moderate Pain (4 - 6)  acetaminophen     Tablet .. 650 milliGRAM(s) Oral every 6 hours PRN Temp greater or equal to 38C (100.4F)  albuterol    90 MICROgram(s) HFA Inhaler 2 Puff(s) Inhalation every 6 hours PRN Shortness of Breath and/or Wheezing  guaiFENesin Oral Liquid (Sugar-Free) 100 milliGRAM(s) Oral every 6 hours PRN Cough      RADIOLOGY & ADDITIONAL TESTS:

## 2023-09-21 ENCOUNTER — APPOINTMENT (OUTPATIENT)
Dept: PULMONOLOGY | Facility: CLINIC | Age: 73
End: 2023-09-21

## 2023-09-21 LAB
ALBUMIN SERPL ELPH-MCNC: 2.9 G/DL — LOW (ref 3.3–5.2)
ALP SERPL-CCNC: 65 U/L — SIGNIFICANT CHANGE UP (ref 40–120)
ALT FLD-CCNC: <5 U/L — SIGNIFICANT CHANGE UP
ANION GAP SERPL CALC-SCNC: 14 MMOL/L — SIGNIFICANT CHANGE UP (ref 5–17)
AST SERPL-CCNC: 8 U/L — SIGNIFICANT CHANGE UP
BASOPHILS # BLD AUTO: 0.03 K/UL — SIGNIFICANT CHANGE UP (ref 0–0.2)
BASOPHILS NFR BLD AUTO: 0.3 % — SIGNIFICANT CHANGE UP (ref 0–2)
BILIRUB SERPL-MCNC: 0.4 MG/DL — SIGNIFICANT CHANGE UP (ref 0.4–2)
BUN SERPL-MCNC: 7.4 MG/DL — LOW (ref 8–20)
CALCIUM SERPL-MCNC: 9.3 MG/DL — SIGNIFICANT CHANGE UP (ref 8.4–10.5)
CHLORIDE SERPL-SCNC: 101 MMOL/L — SIGNIFICANT CHANGE UP (ref 96–108)
CO2 SERPL-SCNC: 20 MMOL/L — LOW (ref 22–29)
CREAT SERPL-MCNC: 0.78 MG/DL — SIGNIFICANT CHANGE UP (ref 0.5–1.3)
EGFR: 93 ML/MIN/1.73M2 — SIGNIFICANT CHANGE UP
EOSINOPHIL # BLD AUTO: 0.11 K/UL — SIGNIFICANT CHANGE UP (ref 0–0.5)
EOSINOPHIL NFR BLD AUTO: 1.2 % — SIGNIFICANT CHANGE UP (ref 0–6)
GLUCOSE SERPL-MCNC: 85 MG/DL — SIGNIFICANT CHANGE UP (ref 70–99)
HCT VFR BLD CALC: 40 % — SIGNIFICANT CHANGE UP (ref 39–50)
HGB BLD-MCNC: 13.2 G/DL — SIGNIFICANT CHANGE UP (ref 13–17)
IMM GRANULOCYTES NFR BLD AUTO: 1.6 % — HIGH (ref 0–0.9)
LYMPHOCYTES # BLD AUTO: 0.87 K/UL — LOW (ref 1–3.3)
LYMPHOCYTES # BLD AUTO: 9.7 % — LOW (ref 13–44)
MAGNESIUM SERPL-MCNC: 1.9 MG/DL — SIGNIFICANT CHANGE UP (ref 1.6–2.6)
MCHC RBC-ENTMCNC: 27.7 PG — SIGNIFICANT CHANGE UP (ref 27–34)
MCHC RBC-ENTMCNC: 33 GM/DL — SIGNIFICANT CHANGE UP (ref 32–36)
MCV RBC AUTO: 83.9 FL — SIGNIFICANT CHANGE UP (ref 80–100)
MONOCYTES # BLD AUTO: 0.69 K/UL — SIGNIFICANT CHANGE UP (ref 0–0.9)
MONOCYTES NFR BLD AUTO: 7.7 % — SIGNIFICANT CHANGE UP (ref 2–14)
NEUTROPHILS # BLD AUTO: 7.09 K/UL — SIGNIFICANT CHANGE UP (ref 1.8–7.4)
NEUTROPHILS NFR BLD AUTO: 79.5 % — HIGH (ref 43–77)
PLATELET # BLD AUTO: 222 K/UL — SIGNIFICANT CHANGE UP (ref 150–400)
POTASSIUM SERPL-MCNC: 4.2 MMOL/L — SIGNIFICANT CHANGE UP (ref 3.5–5.3)
POTASSIUM SERPL-SCNC: 4.2 MMOL/L — SIGNIFICANT CHANGE UP (ref 3.5–5.3)
PROT SERPL-MCNC: 6.1 G/DL — LOW (ref 6.6–8.7)
RBC # BLD: 4.77 M/UL — SIGNIFICANT CHANGE UP (ref 4.2–5.8)
RBC # FLD: 15.1 % — HIGH (ref 10.3–14.5)
SODIUM SERPL-SCNC: 135 MMOL/L — SIGNIFICANT CHANGE UP (ref 135–145)
WBC # BLD: 8.93 K/UL — SIGNIFICANT CHANGE UP (ref 3.8–10.5)
WBC # FLD AUTO: 8.93 K/UL — SIGNIFICANT CHANGE UP (ref 3.8–10.5)

## 2023-09-21 PROCEDURE — 93971 EXTREMITY STUDY: CPT | Mod: 26,RT

## 2023-09-21 PROCEDURE — 99223 1ST HOSP IP/OBS HIGH 75: CPT

## 2023-09-21 PROCEDURE — 70551 MRI BRAIN STEM W/O DYE: CPT | Mod: 26

## 2023-09-21 PROCEDURE — 99232 SBSQ HOSP IP/OBS MODERATE 35: CPT

## 2023-09-21 RX ORDER — KETOTIFEN FUMARATE 0.34 MG/ML
1 SOLUTION OPHTHALMIC THREE TIMES A DAY
Refills: 0 | Status: DISCONTINUED | OUTPATIENT
Start: 2023-09-21 | End: 2023-09-22

## 2023-09-21 RX ORDER — SODIUM,POTASSIUM PHOSPHATES 278-250MG
1 POWDER IN PACKET (EA) ORAL
Refills: 0 | Status: DISCONTINUED | OUTPATIENT
Start: 2023-09-21 | End: 2023-09-22

## 2023-09-21 RX ADMIN — CHLORHEXIDINE GLUCONATE 1 APPLICATION(S): 213 SOLUTION TOPICAL at 05:21

## 2023-09-21 RX ADMIN — PREGABALIN 1000 MICROGRAM(S): 225 CAPSULE ORAL at 11:36

## 2023-09-21 RX ADMIN — Medication 0.25 MILLIGRAM(S): at 18:20

## 2023-09-21 RX ADMIN — Medication 105 MILLIGRAM(S): at 05:20

## 2023-09-21 RX ADMIN — Medication 1 TABLET(S): at 11:36

## 2023-09-21 RX ADMIN — Medication 1 DROP(S): at 18:21

## 2023-09-21 RX ADMIN — Medication 1 TABLET(S): at 23:54

## 2023-09-21 RX ADMIN — Medication 150 MICROGRAM(S): at 05:21

## 2023-09-21 RX ADMIN — Medication 81 MILLIGRAM(S): at 11:36

## 2023-09-21 RX ADMIN — HEPARIN SODIUM 5000 UNIT(S): 5000 INJECTION INTRAVENOUS; SUBCUTANEOUS at 05:21

## 2023-09-21 RX ADMIN — Medication 1 DROP(S): at 23:54

## 2023-09-21 RX ADMIN — HEPARIN SODIUM 5000 UNIT(S): 5000 INJECTION INTRAVENOUS; SUBCUTANEOUS at 21:48

## 2023-09-21 RX ADMIN — Medication 105 MILLIGRAM(S): at 21:49

## 2023-09-21 RX ADMIN — Medication 1 TABLET(S): at 18:21

## 2023-09-21 RX ADMIN — HEPARIN SODIUM 5000 UNIT(S): 5000 INJECTION INTRAVENOUS; SUBCUTANEOUS at 18:21

## 2023-09-21 RX ADMIN — KETOTIFEN FUMARATE 1 DROP(S): 0.34 SOLUTION OPHTHALMIC at 21:48

## 2023-09-21 RX ADMIN — Medication 0.25 MILLIGRAM(S): at 05:21

## 2023-09-21 RX ADMIN — Medication 105 MILLIGRAM(S): at 18:20

## 2023-09-21 NOTE — CONSULT NOTE ADULT - SUBJECTIVE AND OBJECTIVE BOX
HOSPITAL COURSE: 72 y/o M with a h/o LLE lymphedema, hypothyroidism, HFpEF, SVT, LBBB, right pleural effusion, COPD, DDD, recent admission in June 2023 for CAP, presents to the ED after exhibiting altered mentation earlier today in his apartment. His niece reported that he was difficult to arouse in the afternoon and later on in the day was found to be wandering around his apartment confused. He was found to be hypoxemic and hypotensive despite 4L crystalloid and was started on IV vasopressor therapy. CT chest reveals worsening multifocal ground glass opacities, septal thickening, and pleural-based nodule in the right lung apex. The patient does not report any current symptoms at this time and does not state that he felt out of the ordinary today, however he is a poor historian.    Allergies    penicillin (Swelling)  Darvon (Swelling)    Intolerances        REVIEW OF SYSTEMS: [ ] Unable to Assess due to neurologic exam   [ x ] All ROS addressed below are non-contributory, except:  Neuro: [ ] Headache [ ] Back pain [ ] Numbness [ ] Weakness [ ] Ataxia [ ] Dizziness [ ] Aphasia [ ] Dysarthria [ ] Visual disturbance  Resp: [ ] Shortness of breath/dyspnea, [ ] Orthopnea [ ] Cough  CV: [ ] Chest pain [ ] Palpitation [ ] Lightheadedness [ ] Syncope  Renal: [ ] Thirst [ ] Edema  GI: [ ] Nausea [ ] Emesis [ ] Abdominal pain [ ] Constipation [ ] Diarrhea  Hem: [ ] Hematemesis [ ] bright red blood per rectum  ID: [ ] Fever [ ] Chills [ ] Dysuria  ENT: [ ] Rhinorrhea    VITALS:   Vital Signs Last 24 Hrs  T(C): 37.5 (14 Sep 2023 12:00), Max: 38.4 (13 Sep 2023 23:03)  T(F): 99.5 (14 Sep 2023 12:00), Max: 101.1 (13 Sep 2023 23:03)  HR: 100 (14 Sep 2023 12:15) (72 - 106)  BP: 117/60 (14 Sep 2023 11:45) (63/41 - 143/132)  BP(mean): 76 (14 Sep 2023 11:45) (48 - 138)  RR: 15 (14 Sep 2023 12:15) (11 - 27)  SpO2: 97% (14 Sep 2023 11:15) (71% - 100%)    Parameters below as of 14 Sep 2023 02:00  Patient On (Oxygen Delivery Method): nasal cannula  O2 Flow (L/min): 3    CAPILLARY BLOOD GLUCOSE        I&O's Summary    13 Sep 2023 07:01  -  14 Sep 2023 07:00  --------------------------------------------------------  IN: 84 mL / OUT: 850 mL / NET: -766 mL      LABS:                        10.5   12.11 )-----------( 194      ( 14 Sep 2023 05:15 )             33.9     09-14    134<L>  |  105  |  23.6<H>  ----------------------------<  120<H>  4.6   |  19.0<L>  |  1.60<H>      MEDICATION LEVELS:     IVF FLUIDS/MEDICATIONS:   MEDICATIONS  (STANDING):  aspirin  chewable 81 milliGRAM(s) Oral daily  azithromycin  IVPB 500 milliGRAM(s) IV Intermittent every 24 hours  azithromycin  IVPB      chlorhexidine 2% Cloths 1 Application(s) Topical <User Schedule>  heparin   Injectable 5000 Unit(s) SubCutaneous every 8 hours  levothyroxine 175 MICROGram(s) Oral daily  methadone    Tablet 10 milliGRAM(s) Oral three times a day  midodrine 10 milliGRAM(s) Oral every 8 hours  norepinephrine Infusion 0.05 MICROgram(s)/kG/Min (8.72 mL/Hr) IV Continuous <Continuous>  piperacillin/tazobactam IVPB.. 3.375 Gram(s) IV Intermittent every 8 hours    MEDICATIONS  (PRN):  acetaminophen     Tablet .. 650 milliGRAM(s) Oral every 6 hours PRN Temp greater or equal to 38C (100.4F)  albuterol    90 MICROgram(s) HFA Inhaler 2 Puff(s) Inhalation every 6 hours PRN Shortness of Breath and/or Wheezing  ALPRAZolam 1 milliGRAM(s) Oral every 12 hours PRN anxiety        IMAGING: reviewed      EXAMINATION:  PHYSICAL EXAM:    Constitutional: No Acute Distress     Neurological: Awake, alert oriented to person, place and time, Following Commands, PERRL, EOMI, No Gaze Preference, Face Symmetrical, Speech Fluent, No dysmetria, No ataxia, No nystagmus     Motor exam: [facility] 5/5                                                  Pulmonary: Clear to Auscultation, No rales, No rhonchi, No wheezes     Cardiovascular: S1, S2, Regular rate and rhythm     Gastrointestinal: Soft, Non-tender, Non-distended     Extremities: No calf tenderness 
                                 [facility]                                        [facility]                                  Zenobia Lugo, & Josr                                      [address] [facility] [address] [address] [facility]                                           [place], [place], [address]                                                [phone]        CC: altered mental status     HISTORY:  The patient is a 73y Male admitted 9/13/23 after presenting with altered mental status. He was reportedly difficult to awaken and later, once awake, was found wandering and confused.   On arrival he was noted to be hypoxemic and hypotensive. He was felt to have septic shock secondary to pneumonia.   He was treated with antibiotics.  Urine and blood cultures were negative.  He was found to have incidental T11 superior endplate compression fracture with mild loss of height for which he was seen by neurosurgery service and no intervention was recommended.   He has remained confused/disoriented and neurology called 9/21/23 to evaluate.     PAST MEDICAL & SURGICAL HISTORY:  DDD (degenerative disc disease), lumbar  Lymphedema of left lower extremity  Tobacco dependence due to cigarettes  Hypothyroidism, unspecified type  Pleural effusion on right  Heart failure  COPD, mild  FHx: SVT (supraventricular tachycardia)  H/O Spinal surgery  S/P rotator cuff repair  S/P inguinal hernia repair  S/P right knee surgery    MEDICATIONS  (STANDING):  ALPRAZolam 0.25 milliGRAM(s) Oral two times a day  artificial tears (preservative free) Ophthalmic Solution 1 Drop(s) Both EYES every 6 hours  aspirin  chewable 81 milliGRAM(s) Oral daily  chlorhexidine 2% Cloths 1 Application(s) Topical <User Schedule>  heparin   Injectable 5000 Unit(s) SubCutaneous every 8 hours  ketotifen 0.025% Ophthalmic Solution 1 Drop(s) Right EYE three times a day  levothyroxine 150 MICROGram(s) Oral daily  potassium phosphate / sodium phosphate Tablet (K-PHOS No. 2) 1 Tablet(s) Oral four times a day  thiamine IVPB 500 milliGRAM(s) IV Intermittent every 8 hours    MEDICATIONS  (PRN):  acetaminophen     Tablet .. 650 milliGRAM(s) Oral every 6 hours PRN Mild Pain (1 - 3), Moderate Pain (4 - 6)  acetaminophen     Tablet .. 650 milliGRAM(s) Oral every 6 hours PRN Temp greater or equal to 38C (100.4F)  albuterol    90 MICROgram(s) HFA Inhaler 2 Puff(s) Inhalation every 6 hours PRN Shortness of Breath and/or Wheezing  guaiFENesin Oral Liquid (Sugar-Free) 100 milliGRAM(s) Oral every 6 hours PRN Cough    Allergies  penicillin (Swelling)  Darvon (Swelling)    SOCIAL HISTORY:  Non smoker.     FAMILY HISTORY:  Family history of early CAD (Father, Mother)  Family history of CVA (Father)    ROS:  Constitutional: The patient denies fevers or weight changes.  Neuro: As per HPI.  Eyes: Denies blurry vision.  Ears/nose/throat: Denies Tinnitus.   Cardiac: Denies chest pain. Denies palpitations.  Respiratory: Denies shortness of breath.  GI: Denies abdominal pain, nausea, or vomiting.  : Denies change in urinary pattern.  Integumentary: Denies rash.  Psych: Denies recent mood changes.  Heme: denies easy bleeding/bruising.    Exam:  Vital Signs Last 24 Hrs  T(C): 36.6 (21 Sep 2023 11:07), Max: 36.9 (20 Sep 2023 17:00)  T(F): 97.9 (21 Sep 2023 11:07), Max: 98.5 (20 Sep 2023 17:00)  HR: 101 (21 Sep 2023 11:07) (91 - 101)  BP: 129/89 (21 Sep 2023 11:07) (119/77 - 141/76)  RR: 18 (21 Sep 2023 11:07) (18 - 18)  SpO2: 95% (21 Sep 2023 11:07) (95% - 96%)    General: NAD.   Carotid bruits absent.     Mental status: The patient is awake, alert, and oriented to self only. Cannot give month, day, or address. There is no aphasia. Memory poor.    Cranial nerves: There is no papilledema. Pupils react symmetrically to light. There is no visual field deficit to confrontation. Extraocular motion is full with no nystagmus.  Facial sensation is intact. Facial musculature is symmetric. Palate elevates symmetrically. Tongue is midline.    Motor: There is normal bulk and tone.  Strength is 5/5 in the right arm and leg.   Strength is 5/5 in the left arm and leg.    Sensation: Intact to light touch and pin. There is no extinction to double simultaneous stimulation.    Reflexes: 1+ throughout and plantar responses are flexor.    Cerebellar: There is no dysmetria on finger to nose testing.    LABS:                         13.2   8.93  )-----------( 222      ( 21 Sep 2023 04:30 )             40.0       09-21    135  |  101  |  7.4<L>  ----------------------------<  85  4.2   |  20.0<L>  |  0.78    Ca    9.3      21 Sep 2023 04:30  Mg     1.9     09-21    TPro  6.1<L>  /  Alb  2.9<L>  /  TBili  0.4  /  DBili  x   /  AST  8   /  ALT  <5  /  AlkPhos  65  09-21    B12: 198  Ammonia: < 10  RADIOLOGY   CT head images reviewed (and concur with report): There is no acute pathology.

## 2023-09-21 NOTE — PROGRESS NOTE ADULT - SUBJECTIVE AND OBJECTIVE BOX
BLAS SUAZO    [id number]    73y      Male    Patient is a 73y old  Male who presents with a chief complaint of Septic shock (20 Sep 2023 11:22)      INTERVAL HPI/OVERNIGHT EVENTS:    patient is completely confused, unable to get much info, patient has right eye redness with itching.       Vital Signs Last 24 Hrs  T(C): 36.6 (21 Sep 2023 11:07), Max: 36.9 (20 Sep 2023 17:00)  T(F): 97.9 (21 Sep 2023 11:07), Max: 98.5 (20 Sep 2023 17:00)  HR: 101 (21 Sep 2023 11:07) (91 - 101)  BP: 129/89 (21 Sep 2023 11:07) (119/77 - 141/76)  RR: 18 (21 Sep 2023 11:07) (18 - 18)  SpO2: 95% (21 Sep 2023 11:07) (95% - 96%)        PHYSICAL EXAM:    GENERAL: Elderly male looking confused  HEENT: right conjunctival injection   NECK: soft, Supple, No JVD  CHEST/LUNG: CTA bilaterally; No wheezing  HEART: S1S2+, Regular rate and rhythm; No murmurs  ABDOMEN: Soft, Nontender, Nondistended; Bowel sounds present  EXTREMITIES:  1+ Peripheral Pulses, No edema  SKIN: No rashes or lesions  NEURO: Confused, not participating in exam       LABS:                        13.2   8.93  )-----------( 222      ( 21 Sep 2023 04:30 )             40.0     09-21    135  |  101  |  7.4<L>  ----------------------------<  85  4.2   |  20.0<L>  |  0.78    Ca    9.3      21 Sep 2023 04:30  Mg     1.9     09-21    TPro  6.1<L>  /  Alb  2.9<L>  /  TBili  0.4  /  DBili  x   /  AST  8   /  ALT  <5  /  AlkPhos  65  09-21      Urinalysis Basic - ( 21 Sep 2023 04:30 )    Color: x / Appearance: x / SG: x / pH: x  Gluc: 85 mg/dL / Ketone: x  / Bili: x / Urobili: x   Blood: x / Protein: x / Nitrite: x   Leuk Esterase: x / RBC: x / WBC x   Sq Epi: x / Non Sq Epi: x / Bacteria: x          I&O's Summary    20 Sep 2023 07:01  -  21 Sep 2023 07:00  --------------------------------------------------------  IN: 290 mL / OUT: 450 mL / NET: -160 mL        MEDICATIONS  (STANDING):  ALPRAZolam 0.25 milliGRAM(s) Oral two times a day  artificial tears (preservative free) Ophthalmic Solution 1 Drop(s) Both EYES every 6 hours  aspirin  chewable 81 milliGRAM(s) Oral daily  chlorhexidine 2% Cloths 1 Application(s) Topical <User Schedule>  heparin   Injectable 5000 Unit(s) SubCutaneous every 8 hours  ketotifen 0.025% Ophthalmic Solution 1 Drop(s) Right EYE three times a day  levothyroxine 150 MICROGram(s) Oral daily  potassium phosphate / sodium phosphate Tablet (K-PHOS No. 2) 1 Tablet(s) Oral four times a day  thiamine IVPB 500 milliGRAM(s) IV Intermittent every 8 hours    MEDICATIONS  (PRN):  acetaminophen     Tablet .. 650 milliGRAM(s) Oral every 6 hours PRN Mild Pain (1 - 3), Moderate Pain (4 - 6)  acetaminophen     Tablet .. 650 milliGRAM(s) Oral every 6 hours PRN Temp greater or equal to 38C (100.4F)  albuterol    90 MICROgram(s) HFA Inhaler 2 Puff(s) Inhalation every 6 hours PRN Shortness of Breath and/or Wheezing  guaiFENesin Oral Liquid (Sugar-Free) 100 milliGRAM(s) Oral every 6 hours PRN Cough

## 2023-09-21 NOTE — PROGRESS NOTE ADULT - ASSESSMENT
73M with PMHx of LLE lymphedema, hypothyroidism, HFpEF, SVT, LBBB, right pleural effusion, COPD, DDD, and a recent admission in June 2023 for CAP, presents to the ED after exhibiting altered mentation. His niece reported that he was difficult to arouse and later on in the day was found to be wandering around his apartment confused. On presentation to the ED he was found to be hypoxemic and hypotensive despite 4L crystalloid and was started on IV vasopressor therapy. CT chest revealed worsening multifocal ground glass opacities, septal thickening, and a pleural-based nodule in the right lung apex.    Plan:     Metabolic encephalopathy:   - Secondary to hypoxic respiratory failure and pneumonia  - CT of the head was without acute intracranial pathology     mri head pending     Will get Neurology consult      Acute hypoxic respiratory failure:   - Hypoxia improved   - On intravenous antibiotics for pneumonia  - V/Q scan with very low probability for pulmonary embolus    Sepsis / Pneumonia  - On piperacillin tazobactam completed  - Vasopressor support was weaned  - Urine and blood cultures were without growth    Lung nodule:  - CT of the head noted a 3 x 1.5 x 0.7 cm pleural-based nodule in the right lung apex    Acute kidney injury:   - Renal function improved on repeat studies    T11 compression fracture  - The patient reported an episode of fall last month  - Neurosurgery consultation noted  - No surgical intervention recommended,   - Analgesic medications as needed  -stop methadone    Hypothyroidism  - On levothyroxine  - tsh suppressed, dose of levothyroxine was decrease from 175 to 150 mcgs  -    Peroneal vein thrombosis:   - Below knee thrombus without proximal propagation  - Not started on anticoagulation  -repeat US done showed stable thrombus, would have patient follow with vascular out patient to get US repeated in 1 month to see for any worsening.     vit b12 def - getting IM cyanocobalamin.        73M with PMHx of LLE lymphedema, hypothyroidism, HFpEF, SVT, LBBB, right pleural effusion, COPD, DDD, and a recent admission in June 2023 for CAP, presents to the ED after exhibiting altered mentation. His niece reported that he was difficult to arouse and later on in the day was found to be wandering around his apartment confused. On presentation to the ED he was found to be hypoxemic and hypotensive despite 4L crystalloid and was started on IV vasopressor therapy. CT chest revealed worsening multifocal ground glass opacities, septal thickening, and a pleural-based nodule in the right lung apex.    Plan:     Metabolic encephalopathy:   - Secondary to hypoxic respiratory failure and pneumonia  - CT of the head was without acute intracranial pathology     mri head pending     Will get Neurology consult, (called)    will do Neuro checks  Q6      Acute hypoxic respiratory failure:   - Hypoxia improved   - On intravenous antibiotics for pneumonia  - V/Q scan with very low probability for pulmonary embolus    Sepsis / Pneumonia:   - On piperacillin tazobactam completed  - Vasopressor support was weaned  - Urine and blood cultures were without growth    Lung nodule:  - CT of the head noted a 3 x 1.5 x 0.7 cm pleural-based nodule in the right lung apex    Acute kidney injury:   - Renal function improved on repeat studies    T11 compression fracture  - The patient reported an episode of fall last month  - Neurosurgery consultation noted  - No surgical intervention recommended,   - Analgesic medications as needed  -stop methadone    Hypothyroidism  - On levothyroxine  - tsh suppressed, dose of levothyroxine was decrease from 175 to 150 mcgs  -    Peroneal vein thrombosis:   - Below knee thrombus without proximal propagation  - Not started on anticoagulation  -repeat US done showed stable thrombus, would have patient follow with vascular out patient to get US repeated in 1 month to see for any worsening.     vit b12 def - getting IM cyanocobalamin.     Right red eye: Will give antihistamine drops, will wash eye as well.

## 2023-09-21 NOTE — CONSULT NOTE ADULT - ASSESSMENT
The patient is a 73y Male with encephalopathy in the setting of pneumonia and sepsis.     Encephalopathy.   Likely toxic metabolic encephalopathy.   Possible mild baseline dementia although unclear.  Had been found to have low B12 and TSH.  No focal deficits on exam.   Agree with plan for brain MRI if patient can tolerate.     Pneumonia/sepsis.   Completed antibiotics.     B 12 deficiency.   Being supplemented.     Hypothyroid  On Levothyroxine.     Case discussed with Dr Garay.     
HPI: 72 y/o M with a h/o LLE lymphedema, hypothyroidism, HFpEF, SVT, LBBB, right pleural effusion, COPD, DDD with incidental finding of T11 compression fracture.    ASSESSMENT/RECs:  - NO acute NSGY Intervention  - Patient already seen outpatient for fracture, outpatient plan was rest, ice and brace when oob walking  - Continue rest, ice as tolerated  - Advised patient to have family bring brace for when OOB  - NSGY is signing off  Will discuss plan with Dr. Last

## 2023-09-21 NOTE — CHART NOTE - NSCHARTNOTEFT_GEN_A_CORE
Pt c/o pain and irritation around right eye  eye sclera not indurated, no erythema, no discharge, no obvious foreign body visualized  skin around eye dry flaky erythematous, no edema not warm to touch  Pt on Zosyn  most likely eczema  continue to monitor  Please have pt re evaluated if pt continues to complain Pt c/o pain and irritation around right eye  eye sclera not indurated, no erythema, no discharge, no obvious foreign body visualized, PERRL  skin around eye dry flaky erythematous, no edema not warm to touch  Pt on Zosyn  most likely eczema  continue to monitor  Please have pt re evaluated if pt continues to complain

## 2023-09-22 ENCOUNTER — TRANSCRIPTION ENCOUNTER (OUTPATIENT)
Age: 73
End: 2023-09-22

## 2023-09-22 VITALS
OXYGEN SATURATION: 95 % | TEMPERATURE: 98 F | DIASTOLIC BLOOD PRESSURE: 78 MMHG | RESPIRATION RATE: 18 BRPM | HEART RATE: 122 BPM | SYSTOLIC BLOOD PRESSURE: 122 MMHG

## 2023-09-22 LAB
ALBUMIN SERPL ELPH-MCNC: 3 G/DL — LOW (ref 3.3–5.2)
ALP SERPL-CCNC: 65 U/L — SIGNIFICANT CHANGE UP (ref 40–120)
ALT FLD-CCNC: <5 U/L — SIGNIFICANT CHANGE UP
ANION GAP SERPL CALC-SCNC: 17 MMOL/L — SIGNIFICANT CHANGE UP (ref 5–17)
AST SERPL-CCNC: 7 U/L — SIGNIFICANT CHANGE UP
BILIRUB SERPL-MCNC: 0.3 MG/DL — LOW (ref 0.4–2)
BUN SERPL-MCNC: 8.5 MG/DL — SIGNIFICANT CHANGE UP (ref 8–20)
CALCIUM SERPL-MCNC: 9.5 MG/DL — SIGNIFICANT CHANGE UP (ref 8.4–10.5)
CHLORIDE SERPL-SCNC: 101 MMOL/L — SIGNIFICANT CHANGE UP (ref 96–108)
CO2 SERPL-SCNC: 19 MMOL/L — LOW (ref 22–29)
CREAT SERPL-MCNC: 0.73 MG/DL — SIGNIFICANT CHANGE UP (ref 0.5–1.3)
EGFR: 95 ML/MIN/1.73M2 — SIGNIFICANT CHANGE UP
GLUCOSE SERPL-MCNC: 84 MG/DL — SIGNIFICANT CHANGE UP (ref 70–99)
HCT VFR BLD CALC: 41.5 % — SIGNIFICANT CHANGE UP (ref 39–50)
HGB BLD-MCNC: 13 G/DL — SIGNIFICANT CHANGE UP (ref 13–17)
MCHC RBC-ENTMCNC: 26.6 PG — LOW (ref 27–34)
MCHC RBC-ENTMCNC: 31.3 GM/DL — LOW (ref 32–36)
MCV RBC AUTO: 85 FL — SIGNIFICANT CHANGE UP (ref 80–100)
PLATELET # BLD AUTO: 243 K/UL — SIGNIFICANT CHANGE UP (ref 150–400)
POTASSIUM SERPL-MCNC: 3.8 MMOL/L — SIGNIFICANT CHANGE UP (ref 3.5–5.3)
POTASSIUM SERPL-SCNC: 3.8 MMOL/L — SIGNIFICANT CHANGE UP (ref 3.5–5.3)
PROT SERPL-MCNC: 6.3 G/DL — LOW (ref 6.6–8.7)
RBC # BLD: 4.88 M/UL — SIGNIFICANT CHANGE UP (ref 4.2–5.8)
RBC # FLD: 15 % — HIGH (ref 10.3–14.5)
SODIUM SERPL-SCNC: 137 MMOL/L — SIGNIFICANT CHANGE UP (ref 135–145)
WBC # BLD: 8.35 K/UL — SIGNIFICANT CHANGE UP (ref 3.8–10.5)
WBC # FLD AUTO: 8.35 K/UL — SIGNIFICANT CHANGE UP (ref 3.8–10.5)

## 2023-09-22 PROCEDURE — C8929: CPT

## 2023-09-22 PROCEDURE — 82435 ASSAY OF BLOOD CHLORIDE: CPT

## 2023-09-22 PROCEDURE — 0225U NFCT DS DNA&RNA 21 SARSCOV2: CPT

## 2023-09-22 PROCEDURE — 99291 CRITICAL CARE FIRST HOUR: CPT

## 2023-09-22 PROCEDURE — 84443 ASSAY THYROID STIM HORMONE: CPT

## 2023-09-22 PROCEDURE — 82947 ASSAY GLUCOSE BLOOD QUANT: CPT

## 2023-09-22 PROCEDURE — 84145 PROCALCITONIN (PCT): CPT

## 2023-09-22 PROCEDURE — 71250 CT THORAX DX C-: CPT | Mod: MA

## 2023-09-22 PROCEDURE — 96374 THER/PROPH/DIAG INJ IV PUSH: CPT

## 2023-09-22 PROCEDURE — 85027 COMPLETE CBC AUTOMATED: CPT

## 2023-09-22 PROCEDURE — 83605 ASSAY OF LACTIC ACID: CPT

## 2023-09-22 PROCEDURE — 36415 COLL VENOUS BLD VENIPUNCTURE: CPT

## 2023-09-22 PROCEDURE — 87641 MR-STAPH DNA AMP PROBE: CPT

## 2023-09-22 PROCEDURE — 82330 ASSAY OF CALCIUM: CPT

## 2023-09-22 PROCEDURE — 94760 N-INVAS EAR/PLS OXIMETRY 1: CPT

## 2023-09-22 PROCEDURE — 84436 ASSAY OF TOTAL THYROXINE: CPT

## 2023-09-22 PROCEDURE — 83735 ASSAY OF MAGNESIUM: CPT

## 2023-09-22 PROCEDURE — 87449 NOS EACH ORGANISM AG IA: CPT

## 2023-09-22 PROCEDURE — 96376 TX/PRO/DX INJ SAME DRUG ADON: CPT

## 2023-09-22 PROCEDURE — 84295 ASSAY OF SERUM SODIUM: CPT

## 2023-09-22 PROCEDURE — 84132 ASSAY OF SERUM POTASSIUM: CPT

## 2023-09-22 PROCEDURE — 85014 HEMATOCRIT: CPT

## 2023-09-22 PROCEDURE — 84100 ASSAY OF PHOSPHORUS: CPT

## 2023-09-22 PROCEDURE — 80307 DRUG TEST PRSMV CHEM ANLYZR: CPT

## 2023-09-22 PROCEDURE — A9540: CPT

## 2023-09-22 PROCEDURE — 82140 ASSAY OF AMMONIA: CPT

## 2023-09-22 PROCEDURE — A9567: CPT

## 2023-09-22 PROCEDURE — 76775 US EXAM ABDO BACK WALL LIM: CPT

## 2023-09-22 PROCEDURE — 82803 BLOOD GASES ANY COMBINATION: CPT

## 2023-09-22 PROCEDURE — 87899 AGENT NOS ASSAY W/OPTIC: CPT

## 2023-09-22 PROCEDURE — 85018 HEMOGLOBIN: CPT

## 2023-09-22 PROCEDURE — 72125 CT NECK SPINE W/O DYE: CPT | Mod: MA

## 2023-09-22 PROCEDURE — 85730 THROMBOPLASTIN TIME PARTIAL: CPT

## 2023-09-22 PROCEDURE — 80053 COMPREHEN METABOLIC PANEL: CPT

## 2023-09-22 PROCEDURE — 87640 STAPH A DNA AMP PROBE: CPT

## 2023-09-22 PROCEDURE — 85025 COMPLETE CBC W/AUTO DIFF WBC: CPT

## 2023-09-22 PROCEDURE — 85610 PROTHROMBIN TIME: CPT

## 2023-09-22 PROCEDURE — 93971 EXTREMITY STUDY: CPT

## 2023-09-22 PROCEDURE — 70551 MRI BRAIN STEM W/O DYE: CPT

## 2023-09-22 PROCEDURE — 87086 URINE CULTURE/COLONY COUNT: CPT

## 2023-09-22 PROCEDURE — 84480 ASSAY TRIIODOTHYRONINE (T3): CPT

## 2023-09-22 PROCEDURE — 87040 BLOOD CULTURE FOR BACTERIA: CPT

## 2023-09-22 PROCEDURE — 81001 URINALYSIS AUTO W/SCOPE: CPT

## 2023-09-22 PROCEDURE — 99239 HOSP IP/OBS DSCHRG MGMT >30: CPT

## 2023-09-22 PROCEDURE — 96375 TX/PRO/DX INJ NEW DRUG ADDON: CPT

## 2023-09-22 PROCEDURE — 74176 CT ABD & PELVIS W/O CONTRAST: CPT | Mod: MA

## 2023-09-22 PROCEDURE — 84484 ASSAY OF TROPONIN QUANT: CPT

## 2023-09-22 PROCEDURE — 78582 LUNG VENTILAT&PERFUS IMAGING: CPT

## 2023-09-22 PROCEDURE — 99233 SBSQ HOSP IP/OBS HIGH 50: CPT

## 2023-09-22 PROCEDURE — 70450 CT HEAD/BRAIN W/O DYE: CPT | Mod: MA

## 2023-09-22 PROCEDURE — 93005 ELECTROCARDIOGRAM TRACING: CPT

## 2023-09-22 PROCEDURE — 93970 EXTREMITY STUDY: CPT

## 2023-09-22 PROCEDURE — 71045 X-RAY EXAM CHEST 1 VIEW: CPT

## 2023-09-22 PROCEDURE — 82607 VITAMIN B-12: CPT

## 2023-09-22 PROCEDURE — 80048 BASIC METABOLIC PNL TOTAL CA: CPT

## 2023-09-22 RX ORDER — ROSUVASTATIN CALCIUM 5 MG/1
1 TABLET ORAL
Refills: 0 | DISCHARGE

## 2023-09-22 RX ORDER — ATORVASTATIN CALCIUM 80 MG/1
1 TABLET, FILM COATED ORAL
Qty: 0 | Refills: 0 | DISCHARGE
Start: 2023-09-22

## 2023-09-22 RX ORDER — METHADONE HYDROCHLORIDE 40 MG/1
1 TABLET ORAL
Qty: 0 | Refills: 0 | DISCHARGE

## 2023-09-22 RX ORDER — LEVOTHYROXINE SODIUM 125 MCG
1 TABLET ORAL
Qty: 0 | Refills: 0 | DISCHARGE
Start: 2023-09-22

## 2023-09-22 RX ORDER — LEVOTHYROXINE SODIUM 125 MCG
1 TABLET ORAL
Refills: 0 | DISCHARGE

## 2023-09-22 RX ORDER — GABAPENTIN 400 MG/1
1 CAPSULE ORAL
Refills: 0 | DISCHARGE

## 2023-09-22 RX ORDER — THIAMINE MONONITRATE (VIT B1) 100 MG
1 TABLET ORAL
Qty: 30 | Refills: 0
Start: 2023-09-22 | End: 2023-10-21

## 2023-09-22 RX ORDER — ATORVASTATIN CALCIUM 80 MG/1
40 TABLET, FILM COATED ORAL AT BEDTIME
Refills: 0 | Status: DISCONTINUED | OUTPATIENT
Start: 2023-09-22 | End: 2023-09-22

## 2023-09-22 RX ORDER — PREGABALIN 225 MG/1
1 CAPSULE ORAL
Qty: 30 | Refills: 0
Start: 2023-09-22 | End: 2023-10-21

## 2023-09-22 RX ADMIN — Medication 150 MICROGRAM(S): at 05:13

## 2023-09-22 RX ADMIN — KETOTIFEN FUMARATE 1 DROP(S): 0.34 SOLUTION OPHTHALMIC at 05:13

## 2023-09-22 RX ADMIN — Medication 105 MILLIGRAM(S): at 05:13

## 2023-09-22 RX ADMIN — Medication 1 DROP(S): at 05:14

## 2023-09-22 RX ADMIN — CHLORHEXIDINE GLUCONATE 1 APPLICATION(S): 213 SOLUTION TOPICAL at 05:12

## 2023-09-22 RX ADMIN — HEPARIN SODIUM 5000 UNIT(S): 5000 INJECTION INTRAVENOUS; SUBCUTANEOUS at 13:19

## 2023-09-22 RX ADMIN — Medication 1 DROP(S): at 12:13

## 2023-09-22 RX ADMIN — Medication 0.25 MILLIGRAM(S): at 05:13

## 2023-09-22 RX ADMIN — HEPARIN SODIUM 5000 UNIT(S): 5000 INJECTION INTRAVENOUS; SUBCUTANEOUS at 05:12

## 2023-09-22 RX ADMIN — Medication 1 TABLET(S): at 05:13

## 2023-09-22 RX ADMIN — Medication 1 TABLET(S): at 12:01

## 2023-09-22 RX ADMIN — Medication 81 MILLIGRAM(S): at 12:01

## 2023-09-22 RX ADMIN — KETOTIFEN FUMARATE 1 DROP(S): 0.34 SOLUTION OPHTHALMIC at 13:20

## 2023-09-22 NOTE — DISCHARGE NOTE NURSING/CASE MANAGEMENT/SOCIAL WORK - NSPROMEDSBROUGHTTOHOSP_GEN_A_NUR
Rate-controlled on metoprolol 50mg BID  · Continue BB BID  · Elevated OJDJU-1-Agrm score of 9 (10.8% risk of stroke)  · Cards eval for anticoagulation  · Continue DAPT  · ASA increased to 325mg by Cards following ischemic event in 2/2017   no

## 2023-09-22 NOTE — PROGRESS NOTE ADULT - PROVIDER SPECIALTY LIST ADULT
Hospitalist
Internal Medicine
Internal Medicine
Hospitalist
Internal Medicine
Neurology
Internal Medicine
Internal Medicine
MICU

## 2023-09-22 NOTE — DISCHARGE NOTE NURSING/CASE MANAGEMENT/SOCIAL WORK - NSDCPEFALRISK_GEN_ALL_CORE
For information on Fall & Injury Prevention, visit: [email or web address] OR  [email or web address] OR  https://www.cdc.gov/steadi/patient.html

## 2023-09-22 NOTE — DISCHARGE NOTE NURSING/CASE MANAGEMENT/SOCIAL WORK - PATIENT PORTAL LINK FT
You can access the FollowMyHealth Patient Portal offered by [facility] by registering at the following website: [email or web address]. By joining [facility]’s FollowMyHealth portal, you will also be able to view your health information using other applications (apps) compatible with our system.

## 2023-09-22 NOTE — PROGRESS NOTE ADULT - ASSESSMENT
73y Male with encephalopathy in the setting of pneumonia and sepsis.     Encephalopathy.   Likely toxic metabolic encephalopathy.   Possible mild baseline dementia although unclear.  Had been found to have low B12 and TSH.  No focal deficits on exam.   Agree with plan for brain MRI if patient can tolerate.     Pneumonia/sepsis.   Completed antibiotics.     B 12 deficiency.   Being supplemented.     Hypothyroid  On Levothyroxine.     Discharge planning.   PT recommending subacute rehabilitation.  No further specific neurologic recommendations. Will be available as needed.     Case discussed with Dr RAMON Ward.

## 2023-09-22 NOTE — DISCHARGE NOTE NURSING/CASE MANAGEMENT/SOCIAL WORK - NSDCFUADDAPPT_GEN_ALL_CORE_FT
Patient has been accepted to attend [facility] for subacute rehab today at 12pm. CLEMENTINE spoke with admissions coordinator, Ga, from [facility] ([phone]) whom confirmed they can accept the patient at 12pm. CLEMENTINE placed call to patient's friend, Adenike Quiles ([phone]), whom is in agreement for patient to attend [facility] for subacute rehab today at 12pm.

## 2023-09-22 NOTE — PROGRESS NOTE ADULT - SUBJECTIVE AND OBJECTIVE BOX
[facility]                                        Neurology at [place]                                 Zenobia Lugo, & Josr                                  [address]                                        [place], [place], [address]                                             [phone]        CC: altered mental status     HPI:   The patient is a 73y Male admitted 9/13/23 after presenting with altered mental status. He was reportedly difficult to awaken and later, once awake, was found wandering and confused.   On arrival he was noted to be hypoxemic and hypotensive. He was felt to have septic shock secondary to pneumonia.   He was treated with antibiotics.  Urine and blood cultures were negative.  He was found to have incidental T11 superior endplate compression fracture with mild loss of height for which he was seen by neurosurgery service and no intervention was recommended.   He has remained confused/disoriented and neurology called 9/21/23 to evaluate.     Interim history:  Remains on 6 [place].   Now improved and able to give more information.     ROS:   Denies headache or dizziness.  Denies chest pain.  Denies shortness of breath.    MEDICATIONS  (STANDING):  artificial tears (preservative free) Ophthalmic Solution 1 Drop(s) Both EYES every 6 hours  aspirin  chewable 81 milliGRAM(s) Oral daily  atorvastatin 40 milliGRAM(s) Oral at bedtime  chlorhexidine 2% Cloths 1 Application(s) Topical <User Schedule>  heparin   Injectable 5000 Unit(s) SubCutaneous every 8 hours  ketotifen 0.025% Ophthalmic Solution 1 Drop(s) Right EYE three times a day  levothyroxine 150 MICROGram(s) Oral daily  potassium phosphate / sodium phosphate Tablet (K-PHOS No. 2) 1 Tablet(s) Oral four times a day  thiamine IVPB 500 milliGRAM(s) IV Intermittent every 8 hours    Vital Signs Last 24 Hrs  T(C): 37 (22 Sep 2023 04:00), Max: 37 (22 Sep 2023 04:00)  T(F): 98.6 (22 Sep 2023 04:00), Max: 98.6 (22 Sep 2023 04:00)  HR: 100 (22 Sep 2023 04:00) (100 - 107)  BP: 142/88 (22 Sep 2023 04:00) (129/89 - 147/87)  RR: 18 (22 Sep 2023 04:00) (18 - 18)  SpO2: 98% (22 Sep 2023 04:00) (95% - 99%)    Parameters below as of 22 Sep 2023 04:00  Patient On (Oxygen Delivery Method): room air    Detailed Neurologic Exam:    Mental status: The patient is awake and alert. Gives name and able to state where he lives and that he is in the hospital. Follows instructions readily. There is no dysarthria.     Cranial nerves: Pupils equal and react symmetrically to light. There is no visual field deficit to threat. Extraocular motion is full with no nystagmus. Facial sensation is intact. Facial musculature is symmetric. Palate elevates symmetrically. Tongue is midline.    Motor: There is normal bulk and tone.  There is no tremor.  Strength grossly 5/5 bilaterally.    Sensation: Grossly intact to light touch and pin.    Reflexes: 1+ throughout and plantar responses are flexor.    Cerebellar: No dysmetria on finger nose testing.    Labs:     09-22    137  |  101  |  8.5  ----------------------------<  84  3.8   |  19.0<L>  |  0.73    Ca    9.5      22 Sep 2023 04:45  Mg     1.9     09-21    TPro  6.3<L>  /  Alb  3.0<L>  /  TBili  0.3<L>  /  DBili  x   /  AST  7   /  ALT  <5  /  AlkPhos  65  09-22                            13.0   8.35  )-----------( 243      ( 22 Sep 2023 04:45 )             41.5       Rad:   MRI brain images reviewed (and concur with report): There is no acute pathology. There is some ischemic white matter change.

## 2023-09-22 NOTE — PROGRESS NOTE ADULT - REASON FOR ADMISSION
Septic shock

## 2023-09-28 ENCOUNTER — APPOINTMENT (OUTPATIENT)
Dept: PULMONOLOGY | Facility: CLINIC | Age: 73
End: 2023-09-28

## 2024-02-12 NOTE — PATIENT PROFILE ADULT - TYPE OF EQUIPMENT CURRENTLY USED AT HOME
[facility]  [address] [id number]    Date: 02/13/24  Laila Marie  [address] [address]    Dear Laila:                                                                                                                              Thank you for choosing [facility] emergency department for care.  Your primary care provider wants to make sure that your ongoing medical care is being addressed. If you require follow up care as a result of your emergency department visit, there are a few things the practice would like you to know.                As part of the network's continuing commitment to caring for our patients, we have added more same day appointments and have extended office hours to meet your medical needs. After hours, on-call physicians are available via your primary care provider's main office line.               We encourage you to contact our office prior to seeking treatment to discuss your symptoms with the medical staff.  Together, we can determine the correct course of action.  A majority of non-emergent conditions such as: common cold, flu-like symptoms, fevers, strains/sprains, dislocations, minor burns, cuts and animal bites can be treated at [facility] facilities. Diagnostic testing is available at some sites.               Of course, if you are experiencing a life threatening medical emergency call 911 or proceed directly to the nearest emergency room.    Your nearest [facility] facility is conveniently located at:    [address] [address]  [phone]  SKIP THE WAIT  Conveniently offered at most [facility] locations  [place] your spot online at [email or web address] or on the [facility]    Sincerely,    [facility]  Dept: [phone]   
cane, straight

## 2024-02-27 NOTE — ED ADULT NURSE NOTE - IS THE PATIENT ABLE TO BE SCREENED?
Caller: AICHAWING CARREROIE    Relationship: Emergency Contact    Best call back number: [phone]       What was the call regarding:       MS HOLCOMB SAID THAT THE PATIENT SAID HE IS NOT SURE IF HE GAVE THE CORRECT PHARMACY. SHE SAID IT SHOULD BE HURST      
RX re-sent to [facility].  
No

## 2024-04-29 NOTE — PHYSICAL THERAPY INITIAL EVALUATION ADULT - ACTIVE RANGE OF MOTION EXAMINATION, REHAB EVAL
HR=66 bpm, PVUH=908/71 mmhg, SpO2=94.0 %, Resp=10 B/min, EtCO2=30 mmHg, Apnea=1 Seconds, Pain=0, Ara=3 no Active ROM deficits were identified

## 2024-05-23 NOTE — ED ADULT NURSE NOTE - PRO INTERPRETER NEED 2
Quincy Rodriguez M.D.  Plastic & Reconstructive Surgery  [facility]  O:  [phone]  F:  [phone]      Previous H&P (Consult Progress Note) reviewed.    No change to patient health or assessment and plan in the last 30 days    Quincy Rodriguez MD      
English

## 2024-10-02 NOTE — ED ADULT NURSE NOTE - NS ED NURSE LEVEL OF CONSCIOUSNESS ORIENTATION
Detail Level: Generalized
Moisturizer Recommendations: Cetaphil, CeraVe, Eucerin, Aveeno
Cleanser Recommendations: Dove unscented, Cetaphil, CeraVe
Detail Level: Zone
Detail Level: Detailed
Oriented - self; Oriented - place; Oriented - time

## 2024-10-17 NOTE — ED PROVIDER NOTE - OBJECTIVE STATEMENT
Pt arrived with complaints of left arm pan and hypertension. Pt stated that he hasn't felt right over the last 2 days. Pt stated that he usually drinks a 12 pack of beer daily. Last drink was Tuesday    A 68 year old male pt with a hx of prostate procedure 3 months ago presents to the ED c/o abdominal pain, urinary issues. The pt states that since having the procedure done on his prostate, he has had episodes of urinary incontinence. He has had multiple CT's of his bladder post-procedure which he states were all negative. Today, pt reports a sudden onset of constant, aching lower abdominal pain and nausea w/o relief. denies fever. denies HA or neck pain. no chest pain or sob. no vomiting or diarrhea. no urinary f/u/d. no back pain. no motor or sensory deficits. denies illicit drug use. no recent travel. no rash. no other acute issues symptoms or concerns

## 2024-11-19 NOTE — ED PROVIDER NOTE - DATE/TIME 1
Detail Level: Detailed Detail Level: Generalized Sunscreen Recommendations: Sun protective clothing, spf sunscreen of 30 or higher. Topical Retinoids Recommendations: Helps with cell turnover and fine line and wrinkles Sunscreen Recommendations: Continual photo protection with sun screen and photo protective clothing. Skin Checks Recommendations: Check skin on a monthly basis for changes and to become familiar with moles. Minoxidil 5% Topical Foam Recommendations: Apply once to twice daily for 9 -12 months to determine effectiveness. Detail Level: Zone 13-Sep-2023 19:50

## 2025-06-23 NOTE — ED CDU PROVIDER SUBSEQUENT DAY NOTE - TRANSFER CONSULTATION #1
PROGRESS NOTE    Subjective     Blanche is a 76 year old here for Follow-up, Office Visit, and Lesion (On scalp)     The patient is here for a follow-up visit.    Scalp Lesion  - He has an acute crush of scalp on the lesion.  - It is irritating him and occasionally gets stuck on it when combing his hair.  - There has been no increase in size, no drainage, or bleeding from the lesion.  - He would like it removed.    Diabetes  - He feels his diabetes is well controlled.  - He has the composition of microalbuminuria.  - His most recent A1c was 7.1, which is down from 7.53 five months ago, showing good improvement.  - BMP was reviewed; electrolytes and renal function were normal.  - He continues to have microalbuminuria, but the number has improved from the last two checks.  - His current medications are Invokana 100 mg 1 tablet daily and repaglinide (Prandin) 1 mg 1 tablet three times a day with meals.  - His blood sugar average is less than 130, with no hypoglycemic events.  - He is working hard on diet and exercise.  - He reports no blurry vision, increased fatigue, polydipsia, polyphagia, polyuria, poor wound healing, or pruritus.  - He does endorse some skin dryness.  - He has not experienced any weight loss, and his weight has been stable.    Cholesterol  - His cholesterol is well controlled.  - He has been taking his medications regularly.  - He is on pravastatin 20 mg 1 tablet three times a week.  - He is exercising.  - He does report some fried foods in his diet.  - His triglycerides are still elevated at 341, but improved from 379 five months ago.  - His LDL is at goal of less than 70 at 32.  - He recalled CAC score was zero in 2020 but given prior elevated triglycerides and diabetes, he is on statin three times a week.    Blood Pressure  - His blood pressures are well controlled and at goal, with a goal blood pressure of less than 140/90.  - He reports no side effects and takes his medications  regularly.  - He reports no hypertensive symptoms.  - He does not experience headache, blurred vision, chest pain, chest pressure, chest heaviness, chest tightness, shortness of breath, or dyspnea on exertion.    Atopic Dermatitis  - He has a history of atopic dermatitis, which worsens with poor diabetes control but usually improves with betamethasone.  - He takes breaks from application.    BPH and ED  - He has a history of BPH and ED and is under the care of a urologist.  - His most recent visit was in 03/2025, and his BPH has been well managed with tamsulosin 0.4 mg daily and Cialis 5 mg as needed.  - He is tolerating the medication regimen well.    Nephrolithiasis  - He has a history of nephrolithiasis, status post left ESWL in 2012, with no stones since then.  - He had a microhematuria workup with negative CT uro and cystoscopy in 2018.  - No gross hematuria is present currently.    Left Adrenal Nodule  - He has a history of a left adrenal nodule, with workup unremarkable and indicative of a nonfunctional adenoma.  - He is following up with Endo and has an upcoming appointment in 08/2025 for repeat blood tests.    Anemia  - CBC this time showed mild anemia of 12.4, normocytic anemia.  - Last hemoglobin five months ago was 14.6.  - Iron studies were completed and were normal.  - B12 and folate checked as well and were normal.  - B12 was 310, which is over goal of 300.    Social History  - Diet: Reports some fried foods in his diet    PAST SURGICAL HISTORY  - Status post left ESWL in 2012    Review of Systems  All other ROS reviewed negative except as mentioned in HPI       SDOH Never Smoker          Objective   Vitals:    06/23/25 1506   BP: 132/69   Pulse: 74   Temp: 97.9 °F (36.6 °C)   SpO2: 96%   Weight: 71.8 kg (158 lb 3.2 oz)     Physical Exam    CONSTITUTIONAL: alert, in no acute distress and current vital signs reviewed.  Vital signs: Within normal limits  HEENT: 1 x 2 cm lesion consistent with seborrheic  keratosis on the anterior forehead  Respiratory: breath sounds clear to auscultation bilaterally, but no respiratory distress and normal respiratory rate and effort.  Cardiovascular: normal rate, regular rhythm, normal S1, normal S2 and edema was not present in the lower extremities.  Skin: Warm and dry, no rash, normal skin color and pigmentation.  Psychiatric: alert and awake, interactive and mood/affect were appropriate.      Labs   - A1c: 7.1   - BMP: Electrolytes and renal function were normal   - Microalbuminuria: Number has improved from the last two checks   - Triglycerides: 341, improved from 379 five months ago   - LDL: 32, at goal of less than 70   - CBC: Hemoglobin 12.4, normocytic anemia   - Iron studies: Normal   - B12: 310, over goal of 300    PROCEDURE    Procedure: Curettage of seborrheic keratosis on the anterior forehead    All questions were answered and agreement to proceed was given after the following Pre-Procedure details were reviewed:  - Risks and Benefits: Minimal bleeding, potential for infection, and cosmetic improvement.  - Alternative Options: Observation, cryotherapy, laser treatment.  - Side effects: Mild pain, temporary redness, swelling.  - Consent: Verbal consent obtained.    Intra-Procedure:  - Time-Out: Confirmed patient identity, procedure, and site.  - Site Preparation: Cleansed with antiseptic solution.    Written informed consent obtained after discussing risks, benefits and alternatives.    Suspected diagnosis: see clinic note  Indication: prevent symptoms and/or bleeding  Location: scalp  The area was prepped with alcohol and infiltrated with 1% lidocaine with 1:100,000 epinephrine.  Lesion size:  1*2 cm   The lesion was removed via shave technique using a currette.  Hemostasis: direct pressure  bacitracin and a clean dressing were applied.    The patient tolerated the procedure well without complications.  Written and verbal wound care instructions provided.         ASSESSMENT AND PLAN        #Seborrheic keratosis  - 1 x 2 cm lesion on anterior forehead  - see procedure note above    #Diabetes mellitus  - A1c goal <7.5  - Blood sugar average <130, no hypoglycemic events  - Reviewed exercise and weight goals  - Referral for eye exam with ophthalmology  - Continues to have microalbuminuria, improved numbers  - LDL at goal of <70 at 32  - CBC shows mild normocytic anemia with hemoglobin at 12.4  - Iron studies, B12, and folate are normal  - Medications: Invokana 100 mg once daily, repaglinide (Prandin) 1 mg three times a day with meals    #Hyperlipidemia  - Goal LDL <70  - LDL at goal of <70 at 32  - Triglycerides improved from 379 to 341  - Patient educated on self-management  - Medications: pravastatin 20 mg three times a week, increased to one tablet every other day    #Adrenal nodule  - History of nonfunctional adenoma  - Follow-up with endocrinology scheduled for 08/2025 for repeat blood tests  - Previous workup unremarkable    #Normocytic anemia  - History of iron deficiency anemia, current labs unremarkable  - CBC shows mild normocytic anemia with hemoglobin at 12.4  - Iron studies, B12, and folate are normal  - No current symptoms related to anemia  - will check plasma cell profile and ETTA and if unrevealing, will discuss referral to heme-onc if Hgb < 11 on rpt testing    #BPH  - Most recent visit 03/2025, BPH doing well  - Follow-up with urology recommended  - Medications: tamsulosin 0.4 mg daily, Cialis 5 mg as needed    #Hypertension  - Blood pressure well controlled, goal <140/90  - DASH diet reviewed  - No hypertensive symptoms reported    #Dermatitis and xerosis cutis  - Use of emollients and sparing use of steroids discussed  - History of atopic dermatitis, usually improved with Betamethasone    Follow-up: The patient will follow up in 4 months.    1. Normocytic anemia  -     Iron And total Iron Binding Capacity; Future  -     Ferritin; Future  -     Vitamin B12  And Folate; Future  -     CBC with Automated Differential; Future  2. Benign essential hypertension  -     losartan (COZAAR) 50 MG tablet; Take 1 tablet by mouth daily.  3. Benign prostatic hyperplasia without lower urinary tract symptoms  -     tamsulosin (FLOMAX) 0.4 MG Cap; Take 1 capsule by mouth daily after a meal.  -     dutasteride (AVODART) 0.5 MG capsule; Take 1 capsule by mouth at bedtime.  4. Mixed hyperlipidemia due to type 2 diabetes mellitus  (CMD)  -     pravastatin (PRAVACHOL) 20 MG tablet; Take 1 tablet by mouth every other day.  -     Lipid Panel With Reflex; Future  5. Other atopic dermatitis  -     betamethasone dipropionate (DIPROSONE) 0.05 % cream; Apply topically 2 times daily.  -     ammonium lactate (AMLACTIN) 12 % lotion; Apply topically 2 times daily as needed for Dry Skin.  6. Xerosis cutis  -     ammonium lactate (AMLACTIN) 12 % lotion; Apply topically 2 times daily as needed for Dry Skin.  7. Type 2 diabetes mellitus with microalbuminuria, without long-term current use of insulin  (CMD)  -     canagliflozin (Invokana) 100 MG tablet; Take 1 tablet by mouth daily (before breakfast).  -     repaglinide (PRANDIN) 1 MG tablet; Take 1 tablet by mouth in the morning and 1 tablet at noon and 1 tablet in the evening. Take before meals.  -     Glycohemoglobin; Future  -     Lipid Panel With Reflex; Future  -     Basic Metabolic Panel; Future    Red flag s/s reviewed and discussed for conditions listed including concerns for prompt ED evaluation  Medical compliance with plan discussed and risks of non-compliance reviewed  Patient education completed on disease process, etiology & prognosis  Patient expresses understanding of the plan  Proper usage and side effects of medications reviewed & discussed  Refer to orders  Return to clinic as clinically indicated as discussed with patient who verbalized understanding of & agreement with the plan      Return in about 4 months (around 10/23/2025) for 20  min appt, follow up, chronic care.         will see patient in ED

## 2025-07-08 NOTE — DISCHARGE NOTE PROVIDER - HOSPITAL COURSE
73y Male w/ PMHx of LLE lymphedema, chronic RT knee pain, hypothyroidism, DDD, HFpEF, Hx of SVT, hx of RT pleural effusion, and COPD (not on home O2) presents to [facility] ED for SOB/MAZARIEGOS and Recurrent Falls admitted for Sepsis 2/2 CAP, New LBBB, Rhabdomyolysis, and R knee pain.    Sepsis and Acute Hypoxic Respiratory Failure 2/2 CAP +/- CHF  - Blood cultures negative x 2  - CT chest: Bilateral groundglass opacities and septal thickening are of   uncertain etiology.  Received Abx and IV Lasix given signs of hypervolemia. tte with hfpef  Oxygen was weaned and now stable for discharge with course of oral abx.    Frequent Falls, R Knee Pain  - CTH WO no acute changes  - Right knee xray noted  - Fall precautions  - ambulate w/ assistance   - pt ambulates with cane   PT rec outpatient PT     73y Male w/ PMHx of LLE lymphedema, chronic RT knee pain, hypothyroidism, DDD, HFpEF, Hx of SVT, hx of RT pleural effusion, and COPD (not on home O2) presents to [facility] ED for SOB/MAZARIEGOS and Recurrent Falls admitted for Sepsis 2/2 CAP, New LBBB, Rhabdomyolysis, and R knee pain.    Sepsis and Acute Hypoxic Respiratory Failure 2/2 CAP +/- CHF  - Blood cultures negative x 2  - CT chest: Bilateral groundglass opacities and septal thickening are of   uncertain etiology.  Received Abx and IV Lasix given signs of hypervolemia. tte with hfpef  Oxygen was weaned and now stable for discharge with course of oral abx.    Frequent Falls, R Knee Pain  - CTH WO no acute changes  - Right knee xray noted  - Fall precautions  - ambulate w/ assistance   - pt ambulates with cane      Vital Signs Last 24 Hrs  T(C): 36.4 (24 Jun 2023 04:53), Max: 36.8 (23 Jun 2023 16:32)  T(F): 97.6 (24 Jun 2023 04:53), Max: 98.3 (23 Jun 2023 16:32)  HR: 92 (24 Jun 2023 08:19) (92 - 105)  BP: 132/84 (24 Jun 2023 04:53) (125/71 - 132/84)  BP(mean): --  RR: 18 (24 Jun 2023 04:53) (16 - 18)  SpO2: 96% (24 Jun 2023 10:32) (90% - 98%)    Parameters below as of 24 Jun 2023 10:32  Patient On (Oxygen Delivery Method): room air        PHYSICAL EXAM:    Constitutional: NAD, awake and alert,   HEENT: PERR, EOMI, Normal Hearing, MMM  Neck: Soft and supple, No LAD, No JVD  Respiratory: Breath sounds are clear bilaterally, No wheezing, rales or rhonchi  Cardiovascular: S1 and S2,    Gastrointestinal: Bowel Sounds present, soft, nontender,    Extremities: No peripheral edema  Vascular: 2+ peripheral pulses  Neurological: A/O x 3, no focal deficits  Musculoskeletal: 5/5 strength b/l upper and lower extremities  Skin: No rashes   eyeglasses